# Patient Record
Sex: MALE | Race: WHITE | NOT HISPANIC OR LATINO | Employment: PART TIME | URBAN - METROPOLITAN AREA
[De-identification: names, ages, dates, MRNs, and addresses within clinical notes are randomized per-mention and may not be internally consistent; named-entity substitution may affect disease eponyms.]

---

## 2017-02-15 ENCOUNTER — ALLSCRIPTS OFFICE VISIT (OUTPATIENT)
Dept: OTHER | Facility: OTHER | Age: 67
End: 2017-02-15

## 2017-04-19 ENCOUNTER — ALLSCRIPTS OFFICE VISIT (OUTPATIENT)
Dept: OTHER | Facility: OTHER | Age: 67
End: 2017-04-19

## 2017-04-21 ENCOUNTER — APPOINTMENT (OUTPATIENT)
Dept: OCCUPATIONAL THERAPY | Facility: HOSPITAL | Age: 67
End: 2017-04-21
Payer: MEDICARE

## 2017-04-21 ENCOUNTER — ALLSCRIPTS OFFICE VISIT (OUTPATIENT)
Dept: OTHER | Facility: OTHER | Age: 67
End: 2017-04-21

## 2017-04-21 DIAGNOSIS — M72.0 PALMAR FASCIAL FIBROMATOSIS: ICD-10-CM

## 2017-04-21 PROCEDURE — G8990 OTHER PT/OT CURRENT STATUS: HCPCS

## 2017-04-21 PROCEDURE — L3913 HFO W/O JOINTS CF: HCPCS

## 2017-04-21 PROCEDURE — G8991 OTHER PT/OT GOAL STATUS: HCPCS

## 2017-05-24 ENCOUNTER — ALLSCRIPTS OFFICE VISIT (OUTPATIENT)
Dept: OTHER | Facility: OTHER | Age: 67
End: 2017-05-24

## 2017-09-01 ENCOUNTER — TRANSCRIBE ORDERS (OUTPATIENT)
Dept: ADMINISTRATIVE | Facility: HOSPITAL | Age: 67
End: 2017-09-01

## 2017-09-01 ENCOUNTER — APPOINTMENT (OUTPATIENT)
Dept: LAB | Facility: MEDICAL CENTER | Age: 67
End: 2017-09-01
Payer: MEDICARE

## 2017-09-01 DIAGNOSIS — I10 ESSENTIAL HYPERTENSION, MALIGNANT: ICD-10-CM

## 2017-09-01 DIAGNOSIS — E55.9 UNSPECIFIED VITAMIN D DEFICIENCY: ICD-10-CM

## 2017-09-01 DIAGNOSIS — I25.118 ATHEROSCLEROSIS OF NATIVE CORONARY ARTERY WITH OTHER FORM OF ANGINA PECTORIS, UNSPECIFIED WHETHER NATIVE OR TRANSPLANTED HEART (HCC): ICD-10-CM

## 2017-09-01 DIAGNOSIS — I25.118 ATHEROSCLEROSIS OF NATIVE CORONARY ARTERY WITH OTHER FORM OF ANGINA PECTORIS, UNSPECIFIED WHETHER NATIVE OR TRANSPLANTED HEART (HCC): Primary | ICD-10-CM

## 2017-09-01 DIAGNOSIS — E78.5 OTHER AND UNSPECIFIED HYPERLIPIDEMIA: ICD-10-CM

## 2017-09-01 LAB
25(OH)D3 SERPL-MCNC: 35.1 NG/ML (ref 30–100)
ALBUMIN SERPL BCP-MCNC: 3.7 G/DL (ref 3.5–5)
ALP SERPL-CCNC: 94 U/L (ref 46–116)
ALT SERPL W P-5'-P-CCNC: 111 U/L (ref 12–78)
ANION GAP SERPL CALCULATED.3IONS-SCNC: 6 MMOL/L (ref 4–13)
AST SERPL W P-5'-P-CCNC: 92 U/L (ref 5–45)
BASOPHILS # BLD AUTO: 0.06 THOUSANDS/ΜL (ref 0–0.1)
BASOPHILS NFR BLD AUTO: 1 % (ref 0–1)
BILIRUB SERPL-MCNC: 0.6 MG/DL (ref 0.2–1)
BUN SERPL-MCNC: 27 MG/DL (ref 5–25)
CALCIUM SERPL-MCNC: 8.5 MG/DL (ref 8.3–10.1)
CHLORIDE SERPL-SCNC: 105 MMOL/L (ref 100–108)
CHOLEST SERPL-MCNC: 122 MG/DL (ref 50–200)
CO2 SERPL-SCNC: 26 MMOL/L (ref 21–32)
CREAT SERPL-MCNC: 0.86 MG/DL (ref 0.6–1.3)
EOSINOPHIL # BLD AUTO: 0.56 THOUSAND/ΜL (ref 0–0.61)
EOSINOPHIL NFR BLD AUTO: 8 % (ref 0–6)
ERYTHROCYTE [DISTWIDTH] IN BLOOD BY AUTOMATED COUNT: 13.4 % (ref 11.6–15.1)
EST. AVERAGE GLUCOSE BLD GHB EST-MCNC: 114 MG/DL
GFR SERPL CREATININE-BSD FRML MDRD: 88 ML/MIN/1.73SQ M
GLUCOSE P FAST SERPL-MCNC: 105 MG/DL (ref 65–99)
HBA1C MFR BLD: 5.6 % (ref 4.2–6.3)
HCT VFR BLD AUTO: 41.2 % (ref 36.5–49.3)
HDLC SERPL-MCNC: 46 MG/DL (ref 40–60)
HGB BLD-MCNC: 14.3 G/DL (ref 12–17)
LDLC SERPL DIRECT ASSAY-MCNC: 74 MG/DL (ref 0–100)
LYMPHOCYTES # BLD AUTO: 3.21 THOUSANDS/ΜL (ref 0.6–4.47)
LYMPHOCYTES NFR BLD AUTO: 47 % (ref 14–44)
MCH RBC QN AUTO: 32.4 PG (ref 26.8–34.3)
MCHC RBC AUTO-ENTMCNC: 34.7 G/DL (ref 31.4–37.4)
MCV RBC AUTO: 93 FL (ref 82–98)
MONOCYTES # BLD AUTO: 0.5 THOUSAND/ΜL (ref 0.17–1.22)
MONOCYTES NFR BLD AUTO: 7 % (ref 4–12)
NEUTROPHILS # BLD AUTO: 2.56 THOUSANDS/ΜL (ref 1.85–7.62)
NEUTS SEG NFR BLD AUTO: 37 % (ref 43–75)
NRBC BLD AUTO-RTO: 0 /100 WBCS
PLATELET # BLD AUTO: 175 THOUSANDS/UL (ref 149–390)
PMV BLD AUTO: 11.3 FL (ref 8.9–12.7)
POTASSIUM SERPL-SCNC: 4.3 MMOL/L (ref 3.5–5.3)
PROT SERPL-MCNC: 6.9 G/DL (ref 6.4–8.2)
RBC # BLD AUTO: 4.41 MILLION/UL (ref 3.88–5.62)
SODIUM SERPL-SCNC: 137 MMOL/L (ref 136–145)
TRIGL SERPL-MCNC: 84 MG/DL
WBC # BLD AUTO: 6.9 THOUSAND/UL (ref 4.31–10.16)

## 2017-09-01 PROCEDURE — 82306 VITAMIN D 25 HYDROXY: CPT

## 2017-09-01 PROCEDURE — 83036 HEMOGLOBIN GLYCOSYLATED A1C: CPT

## 2017-09-01 PROCEDURE — 85025 COMPLETE CBC W/AUTO DIFF WBC: CPT

## 2017-09-01 PROCEDURE — 80061 LIPID PANEL: CPT

## 2017-09-01 PROCEDURE — 36415 COLL VENOUS BLD VENIPUNCTURE: CPT

## 2017-09-01 PROCEDURE — 80053 COMPREHEN METABOLIC PANEL: CPT

## 2017-09-01 PROCEDURE — 83721 ASSAY OF BLOOD LIPOPROTEIN: CPT

## 2017-09-20 ENCOUNTER — ALLSCRIPTS OFFICE VISIT (OUTPATIENT)
Dept: OTHER | Facility: OTHER | Age: 67
End: 2017-09-20

## 2017-09-20 DIAGNOSIS — R74.8 ABNORMAL LEVELS OF OTHER SERUM ENZYMES: ICD-10-CM

## 2017-09-20 DIAGNOSIS — I25.10 ATHEROSCLEROTIC HEART DISEASE OF NATIVE CORONARY ARTERY WITHOUT ANGINA PECTORIS: ICD-10-CM

## 2017-09-26 ENCOUNTER — HOSPITAL ENCOUNTER (OUTPATIENT)
Dept: NON INVASIVE DIAGNOSTICS | Facility: CLINIC | Age: 67
Discharge: HOME | End: 2017-09-26
Payer: MEDICARE

## 2017-09-26 DIAGNOSIS — I25.10 ATHEROSCLEROTIC HEART DISEASE OF NATIVE CORONARY ARTERY WITHOUT ANGINA PECTORIS: ICD-10-CM

## 2017-09-26 LAB
CHEST PAIN STATEMENT: NORMAL
MAX DIASTOLIC BP: 58 MMHG
MAX HEART RATE: 141 BPM
MAX PREDICTED HEART RATE: 151 BPM
MAX. SYSTOLIC BP: 152 MMHG
PROTOCOL NAME: NORMAL
REASON FOR TERMINATION: NORMAL
TARGET HR FORMULA: NORMAL
TEST INDICATION: NORMAL
TIME IN EXERCISE PHASE: 556 S

## 2017-09-26 PROCEDURE — 78452 HT MUSCLE IMAGE SPECT MULT: CPT

## 2017-09-26 PROCEDURE — A9502 TC99M TETROFOSMIN: HCPCS

## 2017-09-26 PROCEDURE — 93017 CV STRESS TEST TRACING ONLY: CPT

## 2017-10-11 ENCOUNTER — HOSPITAL ENCOUNTER (OUTPATIENT)
Dept: RADIOLOGY | Facility: HOSPITAL | Age: 67
Discharge: HOME | End: 2017-10-11
Payer: MEDICARE

## 2017-10-11 DIAGNOSIS — R74.8 ABNORMAL LEVELS OF OTHER SERUM ENZYMES: ICD-10-CM

## 2017-10-11 PROCEDURE — 74160 CT ABDOMEN W/CONTRAST: CPT

## 2017-10-11 RX ADMIN — IOHEXOL 100 ML: 350 INJECTION, SOLUTION INTRAVENOUS at 10:03

## 2017-10-20 NOTE — PROGRESS NOTES
Assessment    1  Elevated ALT measurement (790 4) (R74 0)  2  Elevated AST (SGOT) (790 4) (R74 0)  3  Coronary artery disease (414 00) (I25 10)   #1 general care-influenza vaccine given #2 status post successful treatment of Dupuytren's contracture #3 elevated liver enzymes-had these in the past  Decreased alcohol and had improved  Hepatitis B and C testing negative  Now again elevated with SGOT greater than SGPT-rule out from alcohol  Rule out from statin  Rule out fatty liver-rule out other  At this point we will do a CAT scan of his liver  He will decrease his Crestor from his current dose of 50 mg a week to 40 mg a week  He will be reevaluated in 2 months with repeat liver enzymes #4 cardiology artery disease-had a CABG in the year 2006  Scheduled for repeat Myoview #5 hematospermia-likely related to underlying BPH-monitored by urology  All other problems as per note of December 2016  MEDICAL REGIMEN: Decreasing Crestor to 40 mg weekly using 10 mg dosing, coenzyme Q10, baby aspirin daily, Niaspan 500 mg twice a day, Pepcid 20 mg daily, Naprosyn 500 MGs-1-1/2 tablets at onset of headache, very she use of Lorcet, topical metronidazole 0 75% twice a day when necessary to face  Scheduled for nuclear stress as per scheduled for CAT scan liver  Appointment in 2 months or prior liver profile  Addendum-exercise Myoview in September 2017 normal with normal perfusion-only issue not felt significant is nonspecific ST changes at end of exercise-MINNIE CHART NEXT VISIT EXERCISE MYOVIEW DONE IN [facility] 2017  Addendum-patient called the office on October the ninth 2003 to use his wife's Xanax prior to her CAT scan as he is claustrophobic  will use Xanax 0 25 mg taken 2 hours before procedure        Plan  Coronary artery disease    · * NM MYOCARDIAL PERFUSION SPECT (STRESS AND/OR REST); Status:Active - RetrospectiveAuthorization; Requested QVS:[id number];    Unlinked    · (1) COMPREHENSIVE METABOLIC PANEL; Status:Active - Retrospective Authorization; Requestedfor:01Mar2018;    · CT ABDOMEN W CONTRAST; Status:Active - Retrospective Authorization; Requestedfor:20Sep2017;   Decrease alcohol  Decrease Crestor to 40 mg weekly  CAT scan of liver  Appointment in 2 months with repeat chemistry profile   History of Present Illness  HPI: He was here for his Medicare well visit  He is noted to have elevated liver enzymes on laboratory testing that was done  Specifically labs show a normal vitamin D 35 HDL 46 LDL 74 triglycerides 84 chemistry profile normal with a creatinine of 46, sugar 105, A1c 5 6, cholesterol 122  Get an issue of elevated liver enzymes in the past and they have increased with less alcohol?possibly from a statin  Hepatitis A, B, and C testing negative  He says he may been having more alcohol lately but not large amounts  Was not using excess nonsteroidals  Denies abdominal pain  Denies nausea or vomiting  Bowels are been stable  No bloody stoolhad treatment for his Dupuytren's contracture which was  This patient denies any systemic symptoms  Specifically there has been no evidence of fever, night sweats, significant weight loss or significant decrease in appetite  Successful      Active Problems  1  Anemia (285 9) (D64 9)  2  Arthritis (716 90) (M19 90)  3  Asymptomatic carotid artery stenosis (433 10) (I65 29)  4  Atherosclerosis (440 9) (I70 90)  5  Benign prostate hyperplasia (600 00) (N40 0)  6  Coronary artery disease (414 00) (I25 10)  7  Dermatitis of face (692 9) (L30 9)  8  Dupuytren's contracture (728 6) (M72 0)  9  Gastroesophageal reflux disease (530 81) (K21 9)  10  Headache (784 0) (R51)  11  Hematospermia (608 82) (R36 1)  12  Hyperglycemia (790 29) (R73 9)  13  Hyperlipidemia (272 4) (E78 5)  14  Hypertension (401 9) (I10)  15  Impetigo (684) (L01 00)  16  Migraine headache (346 90) (G43 909)  17  Rash (782 1) (R21)  18  Seasonal allergies (477 9) (J30 2)  19   Vitamin D deficiency (268 9) (E55 9)    Past Medical History  1  History of upper respiratory infection (V12 09) (Z87 09)  2  No pertinent past medical history  Active Problems And Past Medical History Reviewed: The active problems and past medical history were reviewed and updated today  Surgical History  1  History of CABG  2  History of Tonsillectomy  Surgical History Reviewed: The surgical history was reviewed and updated today  Family History  Mother   1  No pertinent family history  Father   2  No pertinent family history    Social History     · Being A Social Drinker   · Denied: History of Drug Use   · Never A Smoker  Social History Reviewed: The social history was reviewed and updated today  The social history was reviewed and is unchanged  Current Meds  1  Benadryl TABS; Therapy: (Recorded:67Bnl3561) to Recorded  2  CoQ-10 100 MG Oral Capsule; Therapy: (Recorded:01Ksy6502) to Recorded  3  Famotidine 20 MG Oral Tablet; TAKE 1 TABLET DAILY AS DIRECTED; Therapy: 27MHK8623 to (Evaluate:59Ept3492)  Requested for: 71Rpp0284; Last Rx:54Rxk3113 Ordered  4  Fish Oil CAPS; Therapy: (Recorded:06Uyo6698) to Recorded  5  Mupirocin 2 % External Ointment; APPLY TO AFFECTED AREA TWICE A DAY; Therapy: 80Fzt0834 to (Last Rx:09Xta5586)  Requested for: 52Zna0260 Ordered  6  Naproxen 500 MG Oral Tablet (Naprosyn); take 1 1/2 tablets at onset severe migraine; Therapy: 40JGF4862 to (Last Rx:76Yxc7866)  Requested for: 16KJX5742 Ordered  7  Niacin 500 MG Oral Tablet; TAKE 2 TABLET Bedtime; Therapy: 10YCK4664 to (Evaluate:77Pje2399); Last Rx:01Jun2016 Ordered  8  Niaspan 500 MG Oral Tablet Extended Release (Niacin ER (Antihyperlipidemic)); TAKE 2 TABLETS AT BEDTIME; Therapy: 50HJJ3394 to (Evaluate:42Ixe0725)  Requested for: 38JFP9508; Last Rx:27Gkn1945 Ordered  9  PriLOSEC OTC 20 MG Oral Tablet Delayed Release; Therapy: (Recorded:31Owi9037) to Recorded  10  Rosuvastatin Calcium 10 MG Oral Tablet (Crestor); 1 TABLET DAILY;   Therapy: 06HJS6684 to (Last Rx:84Emh2155)  Requested for: 34LQC9517 Ordered  11  Rosuvastatin Calcium 5 MG Oral Tablet (Crestor); TAKE 1 TABLET DAILY; Therapy: 74YSC8244 to (Evaluate:67Awg2973)  Requested for: 25TNE3395; Last Rx:16Oih0119  Ordered  12  Triamcinolone Acetonide 0 1 % External Cream; APPLY  AND RUB  IN A THIN FILM TO AFFECTED  AREAS TWICE DAILY  (AM AND PM); Therapy: 21IZC8252 to (Last Rx:93Ivj1971)  Requested for: 31Oct2016 Ordered  13  Xiaflex 0 9 MG Injection Solution Reconstituted; INJECT 0 31 ML Once; Therapy: 06IAU4752 to (Last Rx:26Jba3008) Ordered  Medication List Reviewed: The medication list was reviewed and updated today  Allergies  1  No Known Drug Allergies    Vitals  Vital Signs    Recorded: 50VWO5277 09:07AM Recorded: 81RRL8628 08:33AM   Heart Rate 68    Respiration 14    Height  5 ft 6 in   Weight  167 lb 6 oz   BMI Calculated  27 02   BSA Calculated  1 85       Physical Exam   Constitutional  General appearance: No acute distress, well appearing and well nourished  Head and Face  Head and face: Normal    Palpation of the face and sinuses: No sinus tenderness  Eyes  Conjunctiva and lids: No erythema, swelling or discharge  Pupils and irises: Equal, round, reactive to light  Ears, Nose, Mouth, and Throat  External inspection of ears and nose: Normal    Otoscopic examination: Tympanic membranes translucent with normal light reflex  Canals patent without erythema  Hearing: Normal    Nasal mucosa, septum, and turbinates: Normal without edema or erythema  Lips, teeth, and gums: Normal, good dentition  Oropharynx: Normal with no erythema, edema, exudate or lesions  Neck  Neck: Supple, symmetric, trachea midline, no masses  Thyroid: Normal, no thyromegaly  Pulmonary  Respiratory effort: No increased work of breathing or signs of respiratory distress  Percussion of chest: Normal    Palpation of chest: Normal    Auscultation of lungs: Clear to auscultation     Cardiovascular  Palpation of heart: Normal PMI, no thrills  Auscultation of heart: Normal rate and rhythm, normal S1 and S2, no murmurs  Carotid pulses: 2+ bilaterally  Abdominal aorta: Normal    Femoral pulses: 2+ bilaterally  Pedal pulses: 2+ bilaterally  Examination of extremities for edema and/or varicosities: Normal    Chest  Breasts: Normal, no dimpling or skin changes appreciated  Palpation of breasts and axillae: Normal, no masses palpated  Chest: Abnormal  -- Scar from CABG  Abdomen  Abdomen: Non-tender, no masses  Liver and spleen: No hepatomegaly or splenomegaly  Examination for hernias: No hernias appreciated  Anus, perineum, and rectum: Normal sphincter tone, no masses, no prolapse  Genitourinary  Scrotal contents: Normal testes, no masses  Penis: Normal, no lesions  Digital rectal exam of prostate: Abnormal  -- Minimal enlargement of prostate without nodules  Lymphatic  Palpation of lymph nodes in neck: No lymphadenopathy  Palpation of lymph nodes in axillae: No lymphadenopathy  Palpation of lymph nodes in groin: No lymphadenopathy  Palpation of lymph nodes in other areas: No lymphadenopathy  Musculoskeletal  Gait and station: Normal    Inspection/palpation of digits and nails: Normal without clubbing or cyanosis  Inspection/palpation of joints, bones, and muscles: Abnormal  -- Pain in range of motion of the low back to slight degree  Negative leg raise bilaterally  No significant pain on range of motion of the hips or decrease in range of motion of the hips  No abnormalities to pinprick of the legs  Knee and ankle jerks intact and 2+  Able to heel and toe walk without difficulty  Range of motion: Normal    Stability: Normal    Muscle strength/tone: Normal    Skin  Skin and subcutaneous tissue: Normal without rashes or lesions  Examination of the skin for lesions: Abnormal  -- Mild erythematous areas below the nose  Palpation of skin and subcutaneous tissue: Normal turgor     Neurologic Cranial nerves: Cranial nerves 2-12 intact  Cortical function: Normal mental status  Reflexes: 2+ and symmetric  Sensation: No sensory loss  Coordination: Normal finger to nose and heel to shin  Psychiatric  Judgment and insight: Normal    Orientation to person, place and time: Normal    Recent and remote memory: Intact     Mood and affect: Normal        Signatures   Electronically signed by : CHICHO Abel ; Sep 20 2017  9:16AM EST                       (Author)    Electronically signed by : CHICHO Abel ; Oct  1 2017  9:15AM EST                       (Author)    Electronically signed by : CHICHO Abel ; Oct  9 2017  8:29PM EST                       (Author)

## 2017-11-09 ENCOUNTER — APPOINTMENT (OUTPATIENT)
Dept: LAB | Facility: MEDICAL CENTER | Age: 67
End: 2017-11-09
Payer: MEDICARE

## 2017-11-09 ENCOUNTER — TRANSCRIBE ORDERS (OUTPATIENT)
Dept: ADMINISTRATIVE | Facility: HOSPITAL | Age: 67
End: 2017-11-09

## 2017-11-09 DIAGNOSIS — N40.0 BENIGN PROSTATIC HYPERPLASIA, UNSPECIFIED WHETHER LOWER URINARY TRACT SYMPTOMS PRESENT: Primary | ICD-10-CM

## 2017-11-09 DIAGNOSIS — R74.8 ABNORMAL LEVELS OF OTHER SERUM ENZYMES: ICD-10-CM

## 2017-11-09 DIAGNOSIS — N40.0 BENIGN PROSTATIC HYPERPLASIA, UNSPECIFIED WHETHER LOWER URINARY TRACT SYMPTOMS PRESENT: ICD-10-CM

## 2017-11-09 LAB
ALBUMIN SERPL BCP-MCNC: 3.7 G/DL (ref 3.5–5)
ALP SERPL-CCNC: 72 U/L (ref 46–116)
ALT SERPL W P-5'-P-CCNC: 42 U/L (ref 12–78)
ANION GAP SERPL CALCULATED.3IONS-SCNC: 6 MMOL/L (ref 4–13)
AST SERPL W P-5'-P-CCNC: 25 U/L (ref 5–45)
BILIRUB SERPL-MCNC: 0.7 MG/DL (ref 0.2–1)
BUN SERPL-MCNC: 23 MG/DL (ref 5–25)
CALCIUM SERPL-MCNC: 8.7 MG/DL (ref 8.3–10.1)
CHLORIDE SERPL-SCNC: 105 MMOL/L (ref 100–108)
CO2 SERPL-SCNC: 27 MMOL/L (ref 21–32)
CREAT SERPL-MCNC: 0.89 MG/DL (ref 0.6–1.3)
GFR SERPL CREATININE-BSD FRML MDRD: 87 ML/MIN/1.73SQ M
GLUCOSE P FAST SERPL-MCNC: 108 MG/DL (ref 65–99)
POTASSIUM SERPL-SCNC: 4.4 MMOL/L (ref 3.5–5.3)
PROT SERPL-MCNC: 7.1 G/DL (ref 6.4–8.2)
PSA SERPL-MCNC: 1.7 NG/ML (ref 0–4)
SODIUM SERPL-SCNC: 138 MMOL/L (ref 136–145)

## 2017-11-09 PROCEDURE — 80053 COMPREHEN METABOLIC PANEL: CPT

## 2017-11-09 PROCEDURE — 36415 COLL VENOUS BLD VENIPUNCTURE: CPT

## 2017-11-09 PROCEDURE — G0103 PSA SCREENING: HCPCS

## 2017-11-13 ENCOUNTER — ALLSCRIPTS OFFICE VISIT (OUTPATIENT)
Dept: OTHER | Facility: OTHER | Age: 67
End: 2017-11-13

## 2017-11-13 DIAGNOSIS — K80.20 CALCULUS OF GALLBLADDER WITHOUT CHOLECYSTITIS WITHOUT OBSTRUCTION: ICD-10-CM

## 2017-11-14 NOTE — PROGRESS NOTES
Assessment   1  Elevated liver enzymes (790 5) (R74 8)  2  Hypertension (401 9) (I10)  3  Cholelithiases (574 20) (K80 20)  4  Abdominal wall hernia (553 20) (K43 9)  5  Hyperlipidemia (272 4) (E78 5)       1  General care-medically clear for cataract surgery forms filled out     2  Status post successful treatment of Dupuytrens contracture     3  Elevated liver enzymes-had these in the past  Decrease alcohol improved  Hepatitis-B and Celsius testing negative  Recently again with elevated SGOT greater than SGPT  CT scan shows unremarkable liver, gallstone, abdominal wall hernia  He is on a low dose of Crestor liver enzymes now normal  I do not think we need to do anything other than monitor  For repeat prior to next visit     4  Abdominal wall hernia-no treatment needed-small he also has associated diastasis recti     5  Cholelithiasis-he describes intermittent episodes over the years of infrequent abdominal pain  I did order an ultrasound  We will watch his pattern  For now continue to monitor  He says he does not have an episode more than once or twice a year     6  Coronary artery disease  Had a CABG in the year 2006  Recent Myoview in September this year negative     7  Amount of spur me a-felt related underlying BPH-monitored by Neurology     8  Hyperlipidemia-treating aggressive with his known CAD  On a statin   Non HDL correlates with his and MR like protein-recently dose of Crestor decreased because of elevated liver enzymes and now on a 4 days per week          All other problems as per noted December 2016               MEDICAL REGIMEN:         Crestor 10 milligrams 4 days per week, coenzyme Q10, baby aspirin daily, Niaspan 500 milligrams b i d , Pepcid 20 milligrams daily, Naprosyn 500 milligrams-1 and half tablets at onset of headache, infrequent use of Fioricet, topical metronidazole 0 75% b i d  p r n  to face          Appointment over the next 4 months with prior labs include chemistry profile, cholesterol profile          Addendum-ultrasound of right upper quadrant shows single gallstone without other issues  Addendum-patient [facility] in [facility] on December 15th for acute sinusitis and treated with Augmentin                    Plan   Cholelithiases    · * 56 [address]; Status:Active; Requested for:13Nov2017;   Hyperlipidemia, Hypertension    · (1) CHOLESTEROL, TOTAL; Status:Active; Requested ODH:[id number];    · (1) COMPREHENSIVE METABOLIC PANEL; Status:Active; Requested WPM:72FPQ4069;    · (1) HDL,DIRECT; Status:Active; Requested CDD:21IMF8200;    · (1) LDL,DIRECT; Status:Active; Requested WLS:81DZC7182;    · (1) TRIGLYCERIDE; Status:Active; Requested IGV:75TBL2624;       Continue Crestor at current dose  Ultrasound of gallbladder  Observe closely regarding frequency of gallbladder episodes  History of Present Illness   HPI: He returns for follow-up exam  As noted a a CT scan of his abdomen with several issues  He had cholelithiasis the with a punctate pendant calcified gallstone, he had aortic calcification  He had diastasis recti with a fat containing hernia measuring up to 3 centimeters  He had diffuse DJD  As noted at prior visit we adjust this dose of statin because of his elevated liver enzymes  He has had prior hepatitis-B and Celsius testing negative  He had elevated liver enzymes in the past any decreased alcohol was improved  Labs done prior to this visit show normal chemistry profile other than a random sugar 108 as well as a PSA of 1 7   has known hypertension  BP adequately controlled on current regimen  Avoiding salt and decongestants  Denies hematemesis pounding of his heart sweats and headache  also had a recent repeat Myoview which was normal with some nonspecific ST changes at the end of exercise but perfusion imaging normal    is not having any significant alcohol use  He has not had any pain from the abdominal wall hernia   We went over the anatomy of cholelithiasis   also he will be having cataract surgery requires a form to be filled out      Review of Systems   Complete-Male:      Constitutional: No fever or chills, feels well, no tiredness, no recent weight gain or weight loss  Eyes: No complaints of eye pain, no red eyes, no discharge from eyes, no itchy eyes  ENT: no complaints of earache, no hearing loss, no nosebleeds, no nasal discharge, no sore throat, no hoarseness  Cardiovascular: No complaints of slow heart rate, no fast heart rate, no chest pain, no palpitations, no leg claudication, no lower extremity  Respiratory: No complaints of shortness of breath, no wheezing, no cough, no SOB on exertion, no orthopnea or PND  Gastrointestinal: No complaints of abdominal pain, no constipation, no nausea or vomiting, no diarrhea or bloody stools  Genitourinary: Hematospermia, but-- no dysuria,-- no urinary hesitancy,-- no genital lesions,-- no incontinence-- and-- no nocturia  Musculoskeletal: No complaints of arthralgia, no myalgias, no joint swelling or stiffness, no limb pain or swelling  Integumentary: No complaints of skin rash or skin lesions, no itching, no skin wound, no dry skin  Neurological: No compliants of headache, no confusion, no convulsions, no numbness or tingling, no dizziness or fainting, no limb weakness, no difficulty walking  Psychiatric: Is not suicidal, no sleep disturbances, no anxiety or depression, no change in personality, no emotional problems  Endocrine: No complaints of proptosis, no hot flashes, no muscle weakness, no erectile dysfunction, no deepening of the voice, no feelings of weakness  Hematologic/Lymphatic: No complaints of swollen glands, no swollen glands in the neck, does not bleed easily, no easy bruising  Active Problems   1  Anemia (285 9) (D64 9)  2  Arthritis (716 90) (M19 90)  3  Asymptomatic carotid artery stenosis (433 10) (I65 29)  4  Atherosclerosis (440 9) (I70 90)  5  Benign prostate hyperplasia (600 00) (N40 0)  6  Cholelithiases (574 20) (K80 20)  7  Coronary artery disease (414 00) (I25 10)  8  Dermatitis of face (692 9) (L30 9)  9  Dupuytren's contracture (728 6) (M72 0)  10  Elevated ALT measurement (790 4) (R74 0)  11  Elevated AST (SGOT) (790 4) (R74 0)  12  Elevated liver enzymes (790 5) (R74 8)  13  Gastroesophageal reflux disease (530 81) (K21 9)  14  Headache (784 0) (R51)  15  Hematospermia (608 82) (R36 1)  16  Hyperglycemia (790 29) (R73 9)  17  Hyperlipidemia (272 4) (E78 5)  18  Hypertension (401 9) (I10)  19  Impetigo (684) (L01 00)  20  Migraine headache (346 90) (G43 909)  21  Need for immunization against influenza (V04 81) (Z23)  22  Rash (782 1) (R21)  23  Seasonal allergies (477 9) (J30 2)  24  Vitamin D deficiency (268 9) (E55 9)    Past Medical History   1  History of upper respiratory infection (V12 09) (Z87 09)  2  No pertinent past medical history  Active Problems And Past Medical History Reviewed: The active problems and past medical history were reviewed and updated today  Surgical History   1  History of CABG  2  History of Tonsillectomy  Surgical History Reviewed: The surgical history was reviewed and updated today  Family History   Mother   1  No pertinent family history  Father   2  No pertinent family history    Social History    · Being A Social Drinker   · Denied: History of Drug Use   · Never A Smoker  Social History Reviewed: The social history was reviewed and updated today  The social history was reviewed and is unchanged  Current Meds   1  Benadryl TABS; Therapy: (Recorded:95Env7957) to Recorded  2  CoQ-10 100 MG Oral Capsule; Therapy: (Recorded:01Dea1594) to Recorded  3  Famotidine 20 MG Oral Tablet; TAKE 1 TABLET DAILY AS DIRECTED; Therapy: 17LZN8175 to (Evaluate:04Uob6216)  Requested for: 18Lel7170; Last Rx:96Brf5887 Ordered  4  Fish Oil CAPS;      Therapy: (Recorded:20Ijs4248) to Recorded  5  Mupirocin 2 % External Ointment; APPLY TO AFFECTED AREA TWICE A DAY; Therapy: 00Dip0884 to (Last Rx:75Vsc1853)  Requested for: 65Fki4865 Ordered  6  Naproxen 500 MG Oral Tablet (Naprosyn); take 1 1/2 tablets at onset severe migraine; Therapy: 37BWS6917 to (Last Rx:84Ptk4135)  Requested for: 17WAR1979 Ordered  7  Niacin 500 MG Oral Tablet; TAKE 2 TABLET Bedtime; Therapy: 04QXC8290 to ([facility]:48OCL2495); Last Rx:40Evv1850 Ordered  8  Niaspan 500 MG Oral Tablet Extended Release (Niacin ER (Antihyperlipidemic)); TAKE 2 TABLETS AT     BEDTIME; Therapy: 13ESW7578 to (Evaluate:46Bsq8851)  Requested for: 22TRD6478; Last Rx:73Nzd7721     Ordered  9  PriLOSEC OTC 20 MG Oral Tablet Delayed Release; Therapy: (Recorded:90Tts7227) to Recorded  10  Rosuvastatin Calcium 10 MG Oral Tablet (Crestor); 1 TABLET DAILY; Therapy: 31CQE9005 to (Last Rx:28Uda3134)  Requested for: 36OTQ4841 Ordered  11  Rosuvastatin Calcium 5 MG Oral Tablet (Crestor); TAKE 1 TABLET DAILY; Therapy: 71FWK0037 to (Evaluate:72Ukm6537)  Requested for: 75NKO7130; Last Rx:36Rnv6409      Ordered  12  Triamcinolone Acetonide 0 1 % External Cream; APPLY  AND RUB  IN A THIN FILM TO AFFECTED      AREAS TWICE DAILY  (AM AND PM); Therapy: 46GRL7290 to (Last Rx:16Yer8536)  Requested for: 31Oct2016 Ordered  13  Xiaflex 0 9 MG Injection Solution Reconstituted; INJECT 0 31 ML Once; Therapy: 83TVQ3624 to (Last Rx:15Wtx5843) Ordered  Medication List Reviewed: The medication list was reviewed and updated today  Allergies   1   No Known Drug Allergies    Vitals   Vital Signs    Recorded: 61IYX0246 08:32PM Recorded: 33UXL2051 12:05PM   Heart Rate 68    Respiration 14    Systolic 201, RLE, Sitting    Diastolic 78, RLE, Sitting    Height  5 ft 6 in   Weight  166 lb 6 oz   BMI Calculated  26 85   BSA Calculated  1 85     Physical Exam        Constitutional      General appearance: No acute distress, well appearing and well nourished  Head and Face      Head and face: Normal        Palpation of the face and sinuses: No sinus tenderness  Eyes      Conjunctiva and lids: No erythema, swelling or discharge  Pupils and irises: Equal, round, reactive to light  Ears, Nose, Mouth, and Throat      External inspection of ears and nose: Normal        Otoscopic examination: Tympanic membranes translucent with normal light reflex  Canals patent without erythema  Hearing: Normal        Nasal mucosa, septum, and turbinates: Normal without edema or erythema  Lips, teeth, and gums: Normal, good dentition  Oropharynx: Normal with no erythema, edema, exudate or lesions  Neck      Neck: Supple, symmetric, trachea midline, no masses  Thyroid: Normal, no thyromegaly  Pulmonary      Respiratory effort: No increased work of breathing or signs of respiratory distress  Percussion of chest: Normal        Palpation of chest: Normal        Auscultation of lungs: Clear to auscultation  Cardiovascular      Palpation of heart: Normal PMI, no thrills  Auscultation of heart: Normal rate and rhythm, normal S1 and S2, no murmurs  Carotid pulses: 2+ bilaterally  Abdominal aorta: Normal        Femoral pulses: 2+ bilaterally  Pedal pulses: 2+ bilaterally  Examination of extremities for edema and/or varicosities: Normal        Chest      Breasts: Normal, no dimpling or skin changes appreciated  Palpation of breasts and axillae: Normal, no masses palpated  Chest: Abnormal  -- Scar from CABG  Abdomen      Abdomen: Non-tender, no masses  Liver and spleen: No hepatomegaly or splenomegaly  Examination for hernias: No hernias appreciated  Anus, perineum, and rectum: Normal sphincter tone, no masses, no prolapse  Genitourinary      Scrotal contents: Normal testes, no masses  Penis: Normal, no lesions  Digital rectal exam of prostate: Abnormal  -- Minimal enlargement of prostate without nodules  Lymphatic      Palpation of lymph nodes in neck: No lymphadenopathy  Palpation of lymph nodes in axillae: No lymphadenopathy  Palpation of lymph nodes in groin: No lymphadenopathy  Palpation of lymph nodes in other areas: No lymphadenopathy  Musculoskeletal      Gait and station: Normal        Inspection/palpation of digits and nails: Normal without clubbing or cyanosis  Inspection/palpation of joints, bones, and muscles: Abnormal  -- Pain in range of motion of the low back to slight degree  Negative leg raise bilaterally  No significant pain on range of motion of the hips or decrease in range of motion of the hips  No abnormalities to pinprick of the legs  Knee and ankle jerks intact and 2+  Able to heel and toe walk without difficulty  Range of motion: Normal        Stability: Normal        Muscle strength/tone: Normal        Skin      Skin and subcutaneous tissue: Normal without rashes or lesions  Examination of the skin for lesions: Abnormal  -- Mild erythematous areas below the nose  Palpation of skin and subcutaneous tissue: Normal turgor  Neurologic      Cranial nerves: Cranial nerves 2-12 intact  Cortical function: Normal mental status  Reflexes: 2+ and symmetric  Sensation: No sensory loss  Coordination: Normal finger to nose and heel to shin  Psychiatric      Judgment and insight: Normal        Orientation to person, place and time: Normal        Recent and remote memory: Intact  Mood and affect: Normal        Future Appointments      Date/Time Provider Specialty Site   03/13/2018 08:00 AM CHICHO Ortega   Internal Medicine Kishor Pineda MD     Signatures    Electronically signed by : CHICHO Blake ; Nov 13 2017  8:39PM EST                       (Author)     Electronically signed by : Travis Messina CHICHO Leon ; Nov 13 2017  8:42PM EST                       (Author)     Electronically signed by : CHICHO Anderson ; Nov 23 2017  8:09AM EST                       (Author)     Electronically signed by : CHICHO Anderson ; Dec 21 2017  1:40PM EST                       (Author)

## 2017-11-17 ENCOUNTER — HOSPITAL ENCOUNTER (OUTPATIENT)
Dept: ULTRASOUND IMAGING | Facility: HOSPITAL | Age: 67
Discharge: HOME | End: 2017-11-17
Payer: MEDICARE

## 2017-11-17 DIAGNOSIS — K80.20 CALCULUS OF GALLBLADDER WITHOUT CHOLECYSTITIS WITHOUT OBSTRUCTION: ICD-10-CM

## 2017-11-17 PROCEDURE — 76700 US EXAM ABDOM COMPLETE: CPT

## 2017-11-27 ENCOUNTER — GENERIC CONVERSION - ENCOUNTER (OUTPATIENT)
Dept: INTERNAL MEDICINE CLINIC | Facility: CLINIC | Age: 67
End: 2017-11-27

## 2017-12-15 ENCOUNTER — OFFICE VISIT (OUTPATIENT)
Dept: URGENT CARE | Facility: MEDICAL CENTER | Age: 67
End: 2017-12-15
Payer: MEDICARE

## 2017-12-15 PROCEDURE — 99213 OFFICE O/P EST LOW 20 MIN: CPT

## 2017-12-15 PROCEDURE — G0463 HOSPITAL OUTPT CLINIC VISIT: HCPCS

## 2017-12-19 NOTE — PROGRESS NOTES
Assessment  1  Acute sinusitis (461 9) (J01 90)    Plan  Acute sinusitis    · Amoxicillin-Pot Clavulanate 875-125 MG Oral Tablet; TAKE 1 TABLET EVERY 12HOURS DAILY   · Seek Immediate Medical Attention if: You have a fever, headache, and vomiting, or have astiff neck ; Status:Complete;   Done: 55HOI2665   · Seek Immediate Medical Attention if: You have a severe headache that will not go away  ;Status:Complete;   Done: 91ZDC4001   · Seek Immediate Medical Attention if: You have signs of infection in or around the affectedarea ; Status:Complete;   Done: 54IUD8625   · Drink at least 6 glasses of water or juice a day ; Status:Complete;   Done: 84PTY0714   · How to use a nasal spray ; Status:Complete;   Done: 76DVW0080   · Irrigate your nose twice a day ; Status:Complete;   Done: 17JGY5583   · Taking a hot steamy shower may help your condition ; Status:Complete;   Done:52Jpf1772    Discussion/Summary  Discussion Summary:   Today your symptoms are consistent with sinusitis-Take the medication as directed with food- try taking antihistamine such as Zyrtec or Claritin for symptom support- Use Nasal spray Flonase as directed  - Increase clear fluids at home and you can alternate Tylenol/Ibuprofen as needed for symptom control- Warm salt H20 gargles/lozenges as needed for sore throat- Try using a humidifier in your urqnnbk-Lhteoc-ko with your primary care physician for re-evaluation within 5-7 days-If you have worsening symptoms or any signs of distress please go to the nearest ER  Medication Side Effects Reviewed: Possible side effects of new medications were reviewed with the patient/guardian today  Understands and agrees with treatment plan: The treatment plan was reviewed with the patient/guardian  The patient/guardian understands and agrees with the treatment plan   Counseling Documentation With Imm: The patient was counseled regarding instructions for management  Follow Up Instructions:    Follow Up with your Primary Care Provider in 3-5 days  If your symptoms worsen, go to the nearest [place] [address] Emergency Department  Chief Complaint  1  Cold Symptoms  Chief Complaint Free Text Note Form: Patient here with sinus congestion and a dry cough  These symptoms started 5 days ago  Nasal secretions thick yellow  History of Present Illness  HPI: 69 y o male who presents with complaint of sinus congestion/nasal congestion, cough, and slight fever X 5 days  Patient states his family has been sick and he may have caught something from one of them  he has not been able to take any Sudafed because of his history of CABG X 4  He recently had cataract removal surgery as well  ROS is as follows  Hospital Based Practices Required Assessment:  Pain Assessment  the patient states they have pain  The pain is located in the sinus pressure  (on a scale of 0 to 10, the patient rates the pain at 2 )  Abuse And Domestic Violence Screen   Yes, the patient is safe at home  -- The patient states no one is hurting them  Depression And Suicide Screen  No, the patient has not had thoughts of hurting themself  No, the patient has not felt depressed in the past 7 days  Prefered Language is  [place]  Primary Language is  English  Review of Systems  Focused-Male:  Constitutional: fever, but-- no chills  ENT: sore throat-- and-- nasal discharge  Cardiovascular: no chest pain-- and-- no palpitations  Respiratory: cough, but-- no shortness of breath-- and-- no wheezing  Gastrointestinal: no abdominal pain,-- no nausea,-- no vomiting-- and-- no diarrhea  ROS Reviewed:   ROS reviewed  Active Problems  1  Abdominal wall hernia (553 20) (K43 9)   2  Anemia (285 9) (D64 9)   3  Arthritis (716 90) (M19 90)   4  Asymptomatic carotid artery stenosis (433 10) (I65 29)   5  Atherosclerosis (440 9) (I70 90)   6  Benign prostate hyperplasia (600 00) (N40 0)   7  Cholelithiases (574 20) (K80 20)   8   Coronary artery disease (414 00) (I25 10) 9  Dermatitis of face (692 9) (L30 9)   10  Dupuytren's contracture (728 6) (M72 0)   11  Elevated ALT measurement (790 4) (R74 0)   12  Elevated AST (SGOT) (790 4) (R74 0)   13  Elevated liver enzymes (790 5) (R74 8)   14  Gastroesophageal reflux disease (530 81) (K21 9)   15  Headache (784 0) (R51)   16  Hematospermia (608 82) (R36 1)   17  Hyperglycemia (790 29) (R73 9)   18  Hyperlipidemia (272 4) (E78 5)   19  Hypertension (401 9) (I10)   20  Impetigo (684) (L01 00)   21  Migraine headache (346 90) (G43 909)   22  Need for immunization against influenza (V04 81) (Z23)   23  Rash (782 1) (R21)   24  Seasonal allergies (477 9) (J30 2)   25  Vitamin D deficiency (268 9) (E55 9)    Past Medical History  1  History of upper respiratory infection (V12 09) (Z87 09)   2  No pertinent past medical history  Active Problems And Past Medical History Reviewed: The active problems and past medical history were reviewed and updated today  Family History  Mother    1  No pertinent family history  Father    2  No pertinent family history  Family History Reviewed: The family history was reviewed and updated today  Social History   · Being A Social Drinker   · Denied: History of Drug Use   · Never A Smoker  Social History Reviewed: The social history was reviewed and updated today  The social history was reviewed and is unchanged  Surgical History  1  History of CABG   2  History of Tonsillectomy  Surgical History Reviewed: The surgical history was reviewed and updated today  Current Meds   1  Benadryl TABS; Therapy: (Recorded:28Jjb1719) to Recorded   2  CoQ-10 100 MG Oral Capsule; Therapy: (Recorded:59Qts1808) to Recorded   3  Famotidine 20 MG Oral Tablet; TAKE 1 TABLET DAILY AS DIRECTED; Therapy: 35OJC9606 to (Sara Bentley)  Requested for: 58KES4022; Last Rx:81Hex4378 Ordered   4  Fish Oil CAPS; Therapy: (Recorded:24Qge8759) to Recorded   5   Mupirocin 2 % External Ointment; APPLY TO AFFECTED AREA TWICE A DAY; Therapy: 72Osy5969 to (Last Rx:10Jng8588)  Requested for: 23Kqf2351 Ordered   6  Naproxen 500 MG Oral Tablet; take 1 1/2 tablets at onset severe migraine; Therapy: 50RPS7898 to (Last Rx:52Efp4750)  Requested for: 98MAL8743 Ordered   7  Niacin 500 MG Oral Tablet; TAKE 2 TABLET Bedtime; Therapy: 97POM5421 to (JANLYONC:08MFI3348); Last Rx:66Jqh4618 Ordered   8  Niaspan 500 MG Oral Tablet Extended Release; TAKE 2 TABLETS AT BEDTIME; Therapy: 65KJT6742 to (Evaluate:58Suf8932)  Requested for: 79ZTB7415; Last Rx:83Buq1229 Ordered   9  PriLOSEC OTC 20 MG Oral Tablet Delayed Release; Therapy: (Recorded:76Idh8838) to Recorded   10  Rosuvastatin Calcium 10 MG Oral Tablet; 1 TABLET DAILY; Therapy: 88ODU5942 to (Last Rx:41Tae9926)  Requested for: 79SAG9293 Ordered   11  Rosuvastatin Calcium 5 MG Oral Tablet; TAKE 1 TABLET DAILY; Therapy: 35AZY2970 to (Evaluate:44Jar8522)  Requested for: 03PJG8624; Last  Rx:47Kxw6665 Ordered   12  Triamcinolone Acetonide 0 1 % External Cream; APPLY  AND RUB  IN A THIN FILM TO  AFFECTED AREAS TWICE DAILY  (AM AND PM); Therapy: 84LDS2556 to (Last Rx:46Hxj7354)  Requested for: 22Bfx9867 Ordered   13  Xiaflex 0 9 MG Injection Solution Reconstituted; INJECT 0 31 ML Once; Therapy: 87BAP8248 to (Last Rx:95Qkj6776) Ordered  Medication List Reviewed: The medication list was reviewed and updated today  Allergies  1  No Known Drug Allergies    Vitals  Signs   Recorded: 15Dec2017 10:18AM   Temperature: 99 1 F, Tympanic  Heart Rate: 83  Respiration: 16  Systolic: 356, RUE, Sitting  Diastolic: 64, RUE, Sitting  Height: 5 ft 6 in  Weight: 166 lb   BMI Calculated: 26 79  BSA Calculated: 1 85  O2 Saturation: 98  Pain Scale: 0    Physical Exam   Constitutional  General appearance: No acute distress, well appearing and well nourished  Eyes  Conjunctiva and lids: No swelling, erythema, or discharge  Pupils and irises: Equal, round and reactive to light     Ears, Nose, Mouth, and Throat External inspection of ears and nose: Normal    Otoscopic examination: Tympanic membrance translucent with normal light reflex  Canals patent without erythema  Nasal mucosa, septum, and turbinates: Normal without edema or erythema  Oropharynx: Abnormal  -- erythema without exudates  Pulmonary  Respiratory effort: No increased work of breathing or signs of respiratory distress  Auscultation of lungs: Clear to auscultation  Cardiovascular  Palpation of heart: Normal PMI, no thrills  Auscultation of heart: Normal rate and rhythm, normal S1 and S2, without murmurs  Psychiatric  Orientation to person, place and time: Normal    Mood and affect: Normal        Future Appointments    Date/Time Provider Specialty Site   03/13/2018 08:00 AM CHICHO Schuler   Internal Medicine Mendel Spire MD     Signatures   Electronically signed by : [place], [facility]; Dec 15 2017 11:07AM EST                       (Author)    Electronically signed by : CHICHO Martin ; Dec 18 2017  1:12PM EST

## 2018-01-12 VITALS
SYSTOLIC BLOOD PRESSURE: 111 MMHG | WEIGHT: 168.25 LBS | DIASTOLIC BLOOD PRESSURE: 71 MMHG | HEART RATE: 60 BPM | BODY MASS INDEX: 27.04 KG/M2 | HEIGHT: 66 IN

## 2018-01-13 VITALS — BODY MASS INDEX: 26.9 KG/M2 | WEIGHT: 167.38 LBS | HEART RATE: 68 BPM | HEIGHT: 66 IN | RESPIRATION RATE: 14 BRPM

## 2018-01-13 VITALS
SYSTOLIC BLOOD PRESSURE: 113 MMHG | HEIGHT: 66 IN | DIASTOLIC BLOOD PRESSURE: 69 MMHG | BODY MASS INDEX: 27.36 KG/M2 | WEIGHT: 170.25 LBS | HEART RATE: 80 BPM

## 2018-01-13 VITALS
HEART RATE: 65 BPM | HEIGHT: 66 IN | DIASTOLIC BLOOD PRESSURE: 65 MMHG | SYSTOLIC BLOOD PRESSURE: 114 MMHG | WEIGHT: 168.5 LBS | BODY MASS INDEX: 27.08 KG/M2

## 2018-01-13 VITALS
HEART RATE: 61 BPM | DIASTOLIC BLOOD PRESSURE: 71 MMHG | HEIGHT: 66 IN | SYSTOLIC BLOOD PRESSURE: 124 MMHG | BODY MASS INDEX: 27.04 KG/M2 | WEIGHT: 168.25 LBS

## 2018-01-14 VITALS
SYSTOLIC BLOOD PRESSURE: 128 MMHG | WEIGHT: 166.38 LBS | HEART RATE: 68 BPM | DIASTOLIC BLOOD PRESSURE: 78 MMHG | RESPIRATION RATE: 14 BRPM | HEIGHT: 66 IN | BODY MASS INDEX: 26.74 KG/M2

## 2018-01-23 VITALS
RESPIRATION RATE: 16 BRPM | HEIGHT: 66 IN | HEART RATE: 83 BPM | BODY MASS INDEX: 26.68 KG/M2 | SYSTOLIC BLOOD PRESSURE: 116 MMHG | DIASTOLIC BLOOD PRESSURE: 64 MMHG | OXYGEN SATURATION: 98 % | TEMPERATURE: 99.1 F | WEIGHT: 166 LBS

## 2018-01-23 NOTE — PROGRESS NOTES
Assessment  #1 general care-Medicare well visit completed  #2 health maintenance-given influenza vaccine today  #3 elevated liver enzymes-she separate dictation of this visit     History of Present Illness  Welcome to Medicare and Wellness Visits: The patient is being seen for the initial annual wellness visit  Medicare Screening and Risk Factors   Hospitalizations: no previous hospitalizations and No recent hospitalizations  Once per lifetime medicare screening tests: ECG  Medicare Screening Tests Risk Questions   Abdominal aortic aneurysm risk assessment: Scheduled for CAT scan of abdomen  Osteoporosis risk assessment: none indicated  HIV risk assessment: none indicated  Drug and Alcohol Use: The patient has never smoked cigarettes  The patient reports frequent alcohol use and drinking 5-6 drinks per week  Alcohol concern:   The patient has no concerns about alcohol abuse  He has never used illicit drugs  Diet and Physical Activity: Current diet includes well balanced meals, 1-2 servings of fruit per day, 1-2 servings of vegetables per day, 0-1 servings of meat per day, 1-2 servings of whole grains per day, 0-1 servings of dairy products per day, 1-2 cups of coffee per day, 0 cups of tea per day, 0 cans of regular soda per day, 0 cans of diet soda per day and 2-3 glasses of water per day  He exercises infrequently and exercises 2 times per week  Exercise: walking, strength training 60 minutes per day  Mood Disorder and Cognitive Impairment Screening: Anxiety screening was done using SANDEEP 7 score of zero  Depression screening was done using [facility] well-being index score of 22   negative for symptoms  He denies feeling down, depressed, or hopeless over the past two weeks  He denies feeling little interest or pleasure in doing things over the past two weeks     Cognitive impairment screening: denies difficulty learning/retaining new information, denies difficulty handling complex tasks, denies difficulty with reasoning, denies difficulty with spatial ability and orientation, denies difficulty with language and denies difficulty with behavior  Functional Ability/Level of Safety: Hearing is normal bilaterally  He denies hearing difficulties  He does not use a hearing aid  The patient is currently able to do activities of daily living without limitations, able to do instrumental activities of daily living without limitations, able to participate in social activities without limitations and able to drive without limitations  Activities of daily living details: does not need help using the phone, no transportation help needed, does not need help shopping, no meal preparation help needed, does not need help doing housework, does not need help doing laundry, does not need help managing medications and does not need help managing money  Injury History: no polypharmacy, no alcohol use, no mobility impairment, no antidepressant use, no deconditioning, no postural hypotension, no sedative use, no visual impairment, no urinary incontinence, antihypertensive use, no cognitive impairment, up and go test was normal and no previous fall  Home safety risk factors:  loose rugs and no handrails on the stairs, but no poor household lighting, no uneven floors, no household clutter and grab bars in the bathroom  Advance Directives: Advance directives: living will, durable power of  for health care directives and advance directives  end of life decisions were reviewed with the patient and I agree with the patient's decisions  Co-Managers and Medical Equipment/Suppliers: See Patient Care Team   Additional Information: Patient's had both types of pneumococcal vaccine  Given influenza vaccine today  Has had herpes zoster vaccine  Has had relatively recent colonoscopy  Has periodic PSA  Review of Systems    Constitutional: negative  Eyes: negative  ENT: negative  Cardiovascular: negative     Respiratory: negative  Gastrointestinal: negative  Genitourinary: negative  Musculoskeletal: negative  Integumentary and Breasts: negative  Neurological: negative  Psychiatric: negative  Endocrine: negative  Hematologic and Lymphatic: negative  Active Problems    1  Anemia (285 9) (D64 9)   2  Arthritis (716 90) (M19 90)   3  Asymptomatic carotid artery stenosis (433 10) (I65 29)   4  Atherosclerosis (440 9) (I70 90)   5  Benign prostate hyperplasia (600 00) (N40 0)   6  Coronary artery disease (414 00) (I25 10)   7  Dermatitis of face (692 9) (L30 9)   8  Dupuytren's contracture (728 6) (M72 0)   9  Gastroesophageal reflux disease (530 81) (K21 9)   10  Headache (784 0) (R51)   11  Hematospermia (608 82) (R36 1)   12  Hyperglycemia (790 29) (R73 9)   13  Hyperlipidemia (272 4) (E78 5)   14  Hypertension (401 9) (I10)   15  Impetigo (684) (L01 00)   16  Migraine headache (346 90) (G43 909)   17  Rash (782 1) (R21)   18  Seasonal allergies (477 9) (J30 2)   19  Vitamin D deficiency (268 9) (E55 9)    Past Medical History    · History of upper respiratory infection (V12 09) (Z87 09)   · No pertinent past medical history    The active problems and past medical history were reviewed and updated today  Surgical History    · History of CABG   · History of Tonsillectomy    The surgical history was reviewed and updated today  Family History  Mother    · No pertinent family history  Father    · No pertinent family history    The family history was reviewed and updated today  Social History    · Being A Social Drinker   · Denied: History of Drug Use   · Never A Smoker  The social history was reviewed and updated today  The social history was reviewed and is unchanged  Current Meds   1  Benadryl TABS; Therapy: (Recorded:16Nic0180) to Recorded   2  CoQ-10 100 MG Oral Capsule; Therapy: (Recorded:17Wui5813) to Recorded   3  Famotidine 20 MG Oral Tablet; TAKE 1 TABLET DAILY AS DIRECTED;    Therapy: 97AEX5220 to (Evaluate:97Tji3089)  Requested for: 47Jeg0142; Last   Rx:68Dsk9298 Ordered   4  Fish Oil CAPS; Therapy: (Recorded:66Meh3588) to Recorded   5  Mupirocin 2 % External Ointment; APPLY TO AFFECTED AREA TWICE A DAY; Therapy: 20Zad7464 to (Last Rx:75Xrb6093)  Requested for: 06Tjp2248 Ordered   6  Naproxen 500 MG Oral Tablet; take 1 1/2 tablets at onset severe migraine; Therapy: 40IDM7243 to (Last Rx:14Tdf9346)  Requested for: 19OLA3899 Ordered   7  Niacin 500 MG Oral Tablet; TAKE 2 TABLET Bedtime; Therapy: 67JJC0152 to (Evaluate:23Mhu6162); Last Rx:04Bts3178 Ordered   8  Niaspan 500 MG Oral Tablet Extended Release; TAKE 2 TABLETS AT BEDTIME; Therapy: 97WIK3412 to (Evaluate:97Ebo1496)  Requested for: 16FRE9537; Last   Rx:25Mdv1539 Ordered   9  PriLOSEC OTC 20 MG Oral Tablet Delayed Release; Therapy: (Recorded:45Npm4396) to Recorded   10  Rosuvastatin Calcium 10 MG Oral Tablet; 1 TABLET DAILY; Therapy: 31QMJ1672 to (Last Rx:22Nkj7321)  Requested for: 70FZZ5526 Ordered   11  Rosuvastatin Calcium 5 MG Oral Tablet; TAKE 1 TABLET DAILY; Therapy: 65WFA2577 to (Evaluate:18Mto9664)  Requested for: 11WKC5203; Last    Rx:85Vty2733 Ordered   12  Triamcinolone Acetonide 0 1 % External Cream; APPLY  AND RUB  IN A THIN FILM TO    AFFECTED AREAS TWICE DAILY  (AM AND PM); Therapy: 32OSO7956 to (Last Rx:31Oct2016)  Requested for: 31Oct2016 Ordered   13  Xiaflex 0 9 MG Injection Solution Reconstituted; INJECT 0 31 ML Once; Therapy: 35ZVD5006 to (Last Rx:73Rky6653) Ordered    The medication list was reviewed and updated today  Allergies    1   No Known Drug Allergies    Immunizations   1 2    Influenza  19-Nov-2015 14-Nov-2016    PCV  29-Jul-2015     PPSV  07-Aug-2014     Zoster  28-Jun-2016      Vitals  Signs    Heart Rate: 68  Respiration: 14   Height: 5 ft 6 in  Weight: 167 lb 6 oz  BMI Calculated: 27 02  BSA Calculated: 1 85    Physical Exam    Constitutional   General appearance: No acute distress, well appearing and well nourished  Head and Face   Head and face: Normal     Palpation of the face and sinuses: No sinus tenderness  Eyes   Conjunctiva and lids: No erythema, swelling or discharge  Pupils and irises: Equal, round, reactive to light  Ears, Nose, Mouth, and Throat   External inspection of ears and nose: Normal     Otoscopic examination: Tympanic membranes translucent with normal light reflex  Canals patent without erythema  Hearing: Normal     Nasal mucosa, septum, and turbinates: Normal without edema or erythema  Lips, teeth, and gums: Normal, good dentition  Oropharynx: Normal with no erythema, edema, exudate or lesions  Neck   Neck: Supple, symmetric, trachea midline, no masses  Thyroid: Normal, no thyromegaly  Pulmonary   Respiratory effort: No increased work of breathing or signs of respiratory distress  Percussion of chest: Normal     Palpation of chest: Normal     Auscultation of lungs: Clear to auscultation  Cardiovascular   Palpation of heart: Normal PMI, no thrills  Auscultation of heart: Normal rate and rhythm, normal S1 and S2, no murmurs  Carotid pulses: 2+ bilaterally  Abdominal aorta: Normal     Femoral pulses: 2+ bilaterally  Pedal pulses: 2+ bilaterally  Examination of extremities for edema and/or varicosities: Normal     Chest   Breasts: Normal, no dimpling or skin changes appreciated  Palpation of breasts and axillae: Normal, no masses palpated  Chest: Abnormal   Scar from CABG  Abdomen   Abdomen: Non-tender, no masses  Liver and spleen: No hepatomegaly or splenomegaly  Examination for hernias: No hernias appreciated  Anus, perineum, and rectum: Normal sphincter tone, no masses, no prolapse  Genitourinary   Scrotal contents: Normal testes, no masses  Penis: Normal, no lesions  Digital rectal exam of prostate: Abnormal   Minimal enlargement of prostate without nodules     Lymphatic   Palpation of lymph nodes in neck: No lymphadenopathy  Palpation of lymph nodes in axillae: No lymphadenopathy  Palpation of lymph nodes in groin: No lymphadenopathy  Palpation of lymph nodes in other areas: No lymphadenopathy  Musculoskeletal   Gait and station: Normal     Inspection/palpation of digits and nails: Normal without clubbing or cyanosis  Inspection/palpation of joints, bones, and muscles: Abnormal   Pain in range of motion of the low back to slight degree  Negative leg raise bilaterally  No significant pain on range of motion of the hips or decrease in range of motion of the hips  No abnormalities to pinprick of the legs  Knee and ankle jerks intact and 2+  Able to heel and toe walk without difficulty  Range of motion: Normal     Stability: Normal     Muscle strength/tone: Normal     Skin   Skin and subcutaneous tissue: Normal without rashes or lesions  Examination of the skin for lesions: Abnormal   Mild erythematous areas below the nose  Palpation of skin and subcutaneous tissue: Normal turgor  Neurologic   Cranial nerves: Cranial nerves 2-12 intact  Cortical function: Normal mental status  Reflexes: 2+ and symmetric  Sensation: No sensory loss  Coordination: Normal finger to nose and heel to shin  Psychiatric   Judgment and insight: Normal     Orientation to person, place and time: Normal     Recent and remote memory: Intact      Mood and affect: Normal        Signatures   Electronically signed by : CHICHO Elizalde ; Sep 20 2017  9:10AM EST                       (Author)

## 2018-02-21 DIAGNOSIS — K21.9 GASTROESOPHAGEAL REFLUX DISEASE, ESOPHAGITIS PRESENCE NOT SPECIFIED: Primary | ICD-10-CM

## 2018-02-21 DIAGNOSIS — E78.5 HYPERLIPIDEMIA, UNSPECIFIED HYPERLIPIDEMIA TYPE: ICD-10-CM

## 2018-02-21 RX ORDER — NIACIN 500 MG
2 TABLET ORAL 2 TIMES DAILY
COMMUNITY
Start: 2016-06-01 | End: 2018-02-21 | Stop reason: SDUPTHER

## 2018-02-21 RX ORDER — FAMOTIDINE 20 MG/1
20 TABLET, FILM COATED ORAL DAILY
Qty: 90 TABLET | Refills: 3 | Status: SHIPPED | OUTPATIENT
Start: 2018-02-21 | End: 2019-01-03 | Stop reason: SDUPTHER

## 2018-02-21 RX ORDER — NIACIN 500 MG
500 TABLET ORAL 2 TIMES DAILY
Qty: 180 TABLET | Refills: 3 | Status: SHIPPED | OUTPATIENT
Start: 2018-02-21 | End: 2020-02-18 | Stop reason: ALTCHOICE

## 2018-02-21 RX ORDER — FAMOTIDINE 20 MG/1
1 TABLET, FILM COATED ORAL DAILY
COMMUNITY
Start: 2017-07-25 | End: 2018-02-21 | Stop reason: SDUPTHER

## 2018-03-01 DIAGNOSIS — E78.5 HYPERLIPIDEMIA: ICD-10-CM

## 2018-03-01 DIAGNOSIS — R74.8 ABNORMAL LEVELS OF OTHER SERUM ENZYMES: ICD-10-CM

## 2018-03-01 DIAGNOSIS — I10 ESSENTIAL (PRIMARY) HYPERTENSION: ICD-10-CM

## 2018-03-06 ENCOUNTER — APPOINTMENT (OUTPATIENT)
Dept: LAB | Facility: MEDICAL CENTER | Age: 68
End: 2018-03-06
Payer: MEDICARE

## 2018-03-06 ENCOUNTER — TRANSCRIBE ORDERS (OUTPATIENT)
Dept: ADMINISTRATIVE | Facility: HOSPITAL | Age: 68
End: 2018-03-06

## 2018-03-06 DIAGNOSIS — I10 ESSENTIAL (PRIMARY) HYPERTENSION: ICD-10-CM

## 2018-03-06 DIAGNOSIS — E78.5 HYPERLIPIDEMIA: ICD-10-CM

## 2018-03-06 LAB
ALBUMIN SERPL BCP-MCNC: 4.1 G/DL (ref 3.5–5)
ALP SERPL-CCNC: 73 U/L (ref 46–116)
ALT SERPL W P-5'-P-CCNC: 35 U/L (ref 12–78)
ANION GAP SERPL CALCULATED.3IONS-SCNC: 7 MMOL/L (ref 4–13)
AST SERPL W P-5'-P-CCNC: 24 U/L (ref 5–45)
BILIRUB SERPL-MCNC: 0.6 MG/DL (ref 0.2–1)
BUN SERPL-MCNC: 19 MG/DL (ref 5–25)
CALCIUM SERPL-MCNC: 8.7 MG/DL (ref 8.3–10.1)
CHLORIDE SERPL-SCNC: 102 MMOL/L (ref 100–108)
CHOLEST SERPL-MCNC: 125 MG/DL (ref 50–200)
CO2 SERPL-SCNC: 28 MMOL/L (ref 21–32)
CREAT SERPL-MCNC: 0.91 MG/DL (ref 0.6–1.3)
GFR SERPL CREATININE-BSD FRML MDRD: 86 ML/MIN/1.73SQ M
GLUCOSE P FAST SERPL-MCNC: 112 MG/DL (ref 65–99)
HDLC SERPL-MCNC: 45 MG/DL (ref 40–60)
LDLC SERPL DIRECT ASSAY-MCNC: 78 MG/DL (ref 0–100)
POTASSIUM SERPL-SCNC: 4.2 MMOL/L (ref 3.5–5.3)
PROT SERPL-MCNC: 7.3 G/DL (ref 6.4–8.2)
SODIUM SERPL-SCNC: 137 MMOL/L (ref 136–145)
TRIGL SERPL-MCNC: 73 MG/DL

## 2018-03-06 PROCEDURE — 83721 ASSAY OF BLOOD LIPOPROTEIN: CPT

## 2018-03-06 PROCEDURE — 80061 LIPID PANEL: CPT

## 2018-03-06 PROCEDURE — 36415 COLL VENOUS BLD VENIPUNCTURE: CPT

## 2018-03-06 PROCEDURE — 80053 COMPREHEN METABOLIC PANEL: CPT

## 2018-03-11 PROBLEM — K43.9 ABDOMINAL WALL HERNIA: Status: ACTIVE | Noted: 2017-11-13

## 2018-03-11 PROBLEM — R74.01 ELEVATED ALT MEASUREMENT: Status: ACTIVE | Noted: 2017-09-20

## 2018-03-11 PROBLEM — K80.20 CHOLELITHIASES: Chronic | Status: ACTIVE | Noted: 2017-11-13

## 2018-03-11 PROBLEM — R74.01 ELEVATED AST (SGOT): Status: ACTIVE | Noted: 2017-09-20

## 2018-03-11 PROBLEM — K80.20 CHOLELITHIASES: Status: ACTIVE | Noted: 2017-11-13

## 2018-03-13 ENCOUNTER — OFFICE VISIT (OUTPATIENT)
Dept: INTERNAL MEDICINE CLINIC | Facility: CLINIC | Age: 68
End: 2018-03-13
Payer: MEDICARE

## 2018-03-13 VITALS
DIASTOLIC BLOOD PRESSURE: 74 MMHG | WEIGHT: 167.4 LBS | RESPIRATION RATE: 14 BRPM | SYSTOLIC BLOOD PRESSURE: 118 MMHG | BODY MASS INDEX: 26.9 KG/M2 | HEIGHT: 66 IN | HEART RATE: 72 BPM

## 2018-03-13 DIAGNOSIS — I25.10 CORONARY ARTERY DISEASE INVOLVING NATIVE HEART WITHOUT ANGINA PECTORIS, UNSPECIFIED VESSEL OR LESION TYPE: Chronic | ICD-10-CM

## 2018-03-13 DIAGNOSIS — I65.23 ASYMPTOMATIC BILATERAL CAROTID ARTERY STENOSIS: Chronic | ICD-10-CM

## 2018-03-13 DIAGNOSIS — N40.0 BENIGN PROSTATIC HYPERPLASIA, UNSPECIFIED WHETHER LOWER URINARY TRACT SYMPTOMS PRESENT: Chronic | ICD-10-CM

## 2018-03-13 DIAGNOSIS — E78.2 MIXED HYPERLIPIDEMIA: Chronic | ICD-10-CM

## 2018-03-13 DIAGNOSIS — I10 ESSENTIAL HYPERTENSION: Primary | Chronic | ICD-10-CM

## 2018-03-13 DIAGNOSIS — R73.9 HYPERGLYCEMIA: Chronic | ICD-10-CM

## 2018-03-13 DIAGNOSIS — G43.909 MIGRAINE WITHOUT STATUS MIGRAINOSUS, NOT INTRACTABLE, UNSPECIFIED MIGRAINE TYPE: Chronic | ICD-10-CM

## 2018-03-13 PROBLEM — K42.9 UMBILICAL HERNIA: Status: ACTIVE | Noted: 2018-03-13

## 2018-03-13 LAB — SL AMB POCT HEMOGLOBIN AIC: 5.3

## 2018-03-13 PROCEDURE — 83036 HEMOGLOBIN GLYCOSYLATED A1C: CPT | Performed by: INTERNAL MEDICINE

## 2018-03-13 PROCEDURE — 99214 OFFICE O/P EST MOD 30 MIN: CPT | Performed by: INTERNAL MEDICINE

## 2018-03-13 RX ORDER — DIPHENHYDRAMINE HCL 25 MG
TABLET ORAL DAILY
COMMUNITY

## 2018-03-13 RX ORDER — ROSUVASTATIN CALCIUM 10 MG/1
1 TABLET, COATED ORAL
COMMUNITY
Start: 2013-06-05 | End: 2018-12-07 | Stop reason: SDUPTHER

## 2018-03-13 RX ORDER — ASPIRIN 81 MG/1
81 TABLET, CHEWABLE ORAL DAILY
COMMUNITY

## 2018-03-13 RX ORDER — NIACIN 500 MG/1
2 TABLET, EXTENDED RELEASE ORAL 2 TIMES DAILY
COMMUNITY
Start: 2014-05-02 | End: 2019-02-24 | Stop reason: SDUPTHER

## 2018-03-13 RX ORDER — CHLORAL HYDRATE 500 MG
CAPSULE ORAL DAILY
COMMUNITY

## 2018-03-13 RX ORDER — NAPROXEN 500 MG/1
250 TABLET ORAL
COMMUNITY
End: 2018-05-04 | Stop reason: SDUPTHER

## 2018-03-13 NOTE — PROGRESS NOTES
Assessment/Plan:  1  Umbilical hernia-small-monitor  2  Dupuytrens contracture of the left 5th finger-orthopedics wants to monitor at present-had success with treatment of 1 of the other hand  3  Elevated liver enzymes-noted in the past   Had decreased with decrease in alcohol use  Hepatitis B and hepatitis  C testing negative  CT scan shows unremarkable liver, gallstone abdominal wall hernia  Improved on lower dose of Crestor liver enzymes now normal  4  Abdominal wall hernia on CT scan-no treatment needed  Also has associated diastasis recti  5  Cholelithiasis with 1 stone-has very infrequent symptoms-this point monitoring  6  Coronary artery disease-had a CABG in the year 2006  Myoview in September 2017 normal-monitor  7  Hematospermia-felt related to underlying BPH-she is monitored by Urology  8  Hyperlipidemia-treating aggressively with his known CAD  On a statin and niacin  As noted recently dose of Crestor decreased because of elevated liver enzymes-adequate numbers at this point  9  Hyperglycemia-fasting sugar 112 but    All other problems as per note of December 2016      MEDICAL REGIMEN:      Crestor 10 milligrams 4 days per week, coenzyme Q10, baby aspirin daily, Niaspan 500 milligrams b i d , Pepcid 20 milligrams daily, Naprosyn 500 milligrams-1 and half tablets at onset of headache, infrequent use of Fioricet, topical metronidazole 0 75% b i d  p r n  to face    Appointment in 6 months with prior chemistry profile, cholesterol profile  No problem-specific Assessment & Plan notes found for this encounter         Diagnoses and all orders for this visit:    Essential hypertension    Coronary artery disease involving native heart without angina pectoris, unspecified vessel or lesion type    Asymptomatic bilateral carotid artery stenosis    Migraine without status migrainosus, not intractable, unspecified migraine type    Mixed hyperlipidemia    Hyperglycemia  -     POCT hemoglobin A1c    Benign prostatic hyperplasia, unspecified whether lower urinary tract symptoms present    Other orders  -     rosuvastatin (CRESTOR) 10 MG tablet; Take 1 tablet by mouth Take 1 tablet 4 times a week  -     Coenzyme Q10 (COQ-10) 100 MG CAPS; Take by mouth  -     niacin (NIASPAN) 500 mg CR tablet; Take 2 tablets by mouth 2 (two) times a day  -     diphenhydrAMINE (BENADRYL) 25 mg tablet; Take by mouth  -     Omega-3 Fatty Acids (FISH OIL) 1,000 mg; Take by mouth daily  -     aspirin 81 mg chewable tablet; Chew 81 mg daily  -     naproxen (NAPROSYN) 500 mg tablet; Take 250 mg by mouth Take 1 and one half tablets at onset of headaches          Subjective:      Patient ID: Zak Cancino is a 69 y o  male  He is working less trying to more free time  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  He was identified on ultrasound of the right upper quadrant have a single gallstone without other issues  He says he has not had an episode of abdominal pain in over 6 months pre wants to monitor  He has significant Dupuytrens contracture of the left 5th finger  He was told by hand surgeon to monitor this    He remains on a statin  He is taking Crestor as 40 milligrams per week  He is tolerating that  Liver enzymes are normal   Laboratory testing done prior to this visit shows an LDL of 78 HDL 45 triglycerides 73 cholesterol 125 chemistry profile normal other than a sugar of 112  His A1c in the office today was normal at 5 3  There was an issue previously of elevated liver enzymes-some this may been from his statin  Liver enzymes prior to this visit normal   Prior CT scan shows unremarkable liver  He has known hypertension  BP adequately controlled on current regimen  Avoiding salt and decongestants  Denies hematemesis pounding of his heart sweats and headache      This patient has a known history of vascular disease   We discussed again optimal control of risk factors including hyperlipidemia, hypertension, and diabetes mellitus  This patient denies any episodes of weakness of one arm or leg compared to the other, numbness of one arm or leg compared to the other, blurred or double vision, or difficulty with speech  This patient denies any claudication symptoms of arms or legs  This patient denies any chest pain or pressure with activity  There has been no recent evidence of abrupt onset of back pain to suggest potential abdominal aortic aneurysm  We also again reviewed contributing factors of atherosclerosis-including tobacco abuse, hypertension, diabetes mellitus, hyperlipidemia, family history and age  This patient understands the whole concept of risk factors and the need for more aggressive treatment of them as compared to patients who do not have significant underlying atherosclerosis  His major risk factors are his hyperlipidemia history borderline hypertension  Not a smoker and does not have diabetes            The following portions of the patient's history were reviewed and updated as appropriate: current medications, past family history, past medical history, past social history, past surgical history and problem list     Review of Systems   Constitutional: Negative  Respiratory: Negative  Cardiovascular: Negative  Gastrointestinal: Negative  Endocrine: Negative  Genitourinary: Negative  Nocturia x1   Musculoskeletal: Negative  Neurological: Negative  Hematological: Negative  Psychiatric/Behavioral: Negative  Objective:      /74   Pulse 72   Resp 14   Ht 5' 6" (1 676 m)   Wt 75 9 kg (167 lb 6 4 oz)   BMI 27 02 kg/m²          Physical Exam   Constitutional: He is oriented to person, place, and time  He appears well-developed and well-nourished  No distress  HENT:   Head: Normocephalic and atraumatic     Right Ear: External ear normal    Left Ear: External ear normal    Nose: Nose normal    Mouth/Throat: Oropharynx is clear and moist  No oropharyngeal exudate  Eyes: Conjunctivae and EOM are normal  Pupils are equal, round, and reactive to light  Right eye exhibits no discharge  Left eye exhibits no discharge  No scleral icterus  Neck: Normal range of motion  Neck supple  No JVD present  No tracheal deviation present  No thyromegaly present  Cardiovascular: Normal rate, regular rhythm, normal heart sounds and intact distal pulses  Exam reveals no gallop and no friction rub  No murmur heard  Pulmonary/Chest: Effort normal and breath sounds normal  No stridor  No respiratory distress  He has no wheezes  He has no rales  He exhibits no tenderness  Scar from prior CABG   Abdominal: Bowel sounds are normal  He exhibits no distension and no mass  There is no tenderness  There is no rebound and no guarding  Small umbilical hernia   Genitourinary: Rectal exam shows guaiac negative stool  No penile tenderness  Musculoskeletal: Normal range of motion  He exhibits no edema, tenderness or deformity  Prominent Dupuytrens contractue of left 5th finger   Lymphadenopathy:     He has no cervical adenopathy  Neurological: He is alert and oriented to person, place, and time  He has normal reflexes  He displays normal reflexes  No cranial nerve deficit  He exhibits normal muscle tone  Coordination normal    Skin: Skin is warm and dry  No rash noted  He is not diaphoretic  No erythema  No pallor  Psychiatric: He has a normal mood and affect  His behavior is normal  Judgment and thought content normal    Vitals reviewed

## 2018-05-04 DIAGNOSIS — G43.909 MIGRAINE WITHOUT STATUS MIGRAINOSUS, NOT INTRACTABLE, UNSPECIFIED MIGRAINE TYPE: Primary | ICD-10-CM

## 2018-05-05 RX ORDER — NAPROXEN 500 MG/1
TABLET ORAL
Qty: 30 TABLET | Refills: 0 | Status: SHIPPED | OUTPATIENT
Start: 2018-05-05 | End: 2018-05-27 | Stop reason: SDUPTHER

## 2018-05-27 DIAGNOSIS — G43.909 MIGRAINE WITHOUT STATUS MIGRAINOSUS, NOT INTRACTABLE, UNSPECIFIED MIGRAINE TYPE: ICD-10-CM

## 2018-05-29 RX ORDER — NAPROXEN 500 MG/1
TABLET ORAL
Qty: 30 TABLET | Refills: 0 | Status: SHIPPED | OUTPATIENT
Start: 2018-05-29 | End: 2018-06-15 | Stop reason: SDUPTHER

## 2018-06-15 DIAGNOSIS — G43.909 MIGRAINE WITHOUT STATUS MIGRAINOSUS, NOT INTRACTABLE, UNSPECIFIED MIGRAINE TYPE: ICD-10-CM

## 2018-06-15 RX ORDER — NAPROXEN 500 MG/1
TABLET ORAL
Qty: 30 TABLET | Refills: 0 | Status: SHIPPED | OUTPATIENT
Start: 2018-06-15 | End: 2020-02-18 | Stop reason: ALTCHOICE

## 2018-09-12 ENCOUNTER — APPOINTMENT (OUTPATIENT)
Dept: LAB | Facility: MEDICAL CENTER | Age: 68
End: 2018-09-12
Payer: MEDICARE

## 2018-09-12 DIAGNOSIS — E78.2 MIXED HYPERLIPIDEMIA: Chronic | ICD-10-CM

## 2018-09-12 DIAGNOSIS — R73.9 HYPERGLYCEMIA: Chronic | ICD-10-CM

## 2018-09-12 DIAGNOSIS — I10 ESSENTIAL HYPERTENSION: Chronic | ICD-10-CM

## 2018-09-12 LAB
ALBUMIN SERPL BCP-MCNC: 3.9 G/DL (ref 3.5–5)
ALP SERPL-CCNC: 89 U/L (ref 46–116)
ALT SERPL W P-5'-P-CCNC: 50 U/L (ref 12–78)
ANION GAP SERPL CALCULATED.3IONS-SCNC: 7 MMOL/L (ref 4–13)
AST SERPL W P-5'-P-CCNC: 30 U/L (ref 5–45)
BILIRUB SERPL-MCNC: 0.81 MG/DL (ref 0.2–1)
BUN SERPL-MCNC: 20 MG/DL (ref 5–25)
CALCIUM SERPL-MCNC: 8.7 MG/DL (ref 8.3–10.1)
CHLORIDE SERPL-SCNC: 104 MMOL/L (ref 100–108)
CHOLEST SERPL-MCNC: 171 MG/DL (ref 50–200)
CO2 SERPL-SCNC: 26 MMOL/L (ref 21–32)
CREAT SERPL-MCNC: 0.94 MG/DL (ref 0.6–1.3)
EST. AVERAGE GLUCOSE BLD GHB EST-MCNC: 111 MG/DL
GFR SERPL CREATININE-BSD FRML MDRD: 82 ML/MIN/1.73SQ M
GLUCOSE P FAST SERPL-MCNC: 108 MG/DL (ref 65–99)
HBA1C MFR BLD: 5.5 % (ref 4.2–6.3)
HDLC SERPL-MCNC: 47 MG/DL (ref 40–60)
LDLC SERPL DIRECT ASSAY-MCNC: 112 MG/DL (ref 0–100)
POTASSIUM SERPL-SCNC: 3.7 MMOL/L (ref 3.5–5.3)
PROT SERPL-MCNC: 8 G/DL (ref 6.4–8.2)
SODIUM SERPL-SCNC: 137 MMOL/L (ref 136–145)
TSH SERPL DL<=0.05 MIU/L-ACNC: 2.1 UIU/ML

## 2018-09-12 PROCEDURE — 80053 COMPREHEN METABOLIC PANEL: CPT

## 2018-09-12 PROCEDURE — 84443 ASSAY THYROID STIM HORMONE: CPT

## 2018-09-12 PROCEDURE — 82465 ASSAY BLD/SERUM CHOLESTEROL: CPT

## 2018-09-12 PROCEDURE — 83718 ASSAY OF LIPOPROTEIN: CPT

## 2018-09-12 PROCEDURE — 83036 HEMOGLOBIN GLYCOSYLATED A1C: CPT

## 2018-09-12 PROCEDURE — 36415 COLL VENOUS BLD VENIPUNCTURE: CPT

## 2018-09-12 PROCEDURE — 83721 ASSAY OF BLOOD LIPOPROTEIN: CPT

## 2018-09-19 ENCOUNTER — OFFICE VISIT (OUTPATIENT)
Dept: INTERNAL MEDICINE CLINIC | Facility: CLINIC | Age: 68
End: 2018-09-19
Payer: MEDICARE

## 2018-09-19 VITALS
HEART RATE: 68 BPM | DIASTOLIC BLOOD PRESSURE: 70 MMHG | SYSTOLIC BLOOD PRESSURE: 110 MMHG | RESPIRATION RATE: 14 BRPM | BODY MASS INDEX: 26.57 KG/M2 | WEIGHT: 164.6 LBS

## 2018-09-19 DIAGNOSIS — E78.01 FAMILIAL HYPERCHOLESTEROLEMIA: Primary | ICD-10-CM

## 2018-09-19 DIAGNOSIS — E55.9 VITAMIN D DEFICIENCY: ICD-10-CM

## 2018-09-19 DIAGNOSIS — R19.7 DIARRHEA, UNSPECIFIED TYPE: ICD-10-CM

## 2018-09-19 PROCEDURE — 99214 OFFICE O/P EST MOD 30 MIN: CPT | Performed by: INTERNAL MEDICINE

## 2018-09-19 NOTE — PROGRESS NOTES
Assessment/Plan:  1  Health maintenance - will be receiving influenza vaccine  2  Health maintenance -given prescription for Shingrix vaccine   3  Elevated liver enzymes-noted in the past   Had decreased with decrease in alcohol use  Hepatitis B and hepatitis  C testing negative  CT scan shows unremarkable liver, gallstone abdominal wall hernia  Improved on lower dose of Crestor liver enzymes now normal  4  Abdominal wall hernia on CT scan-no treatment needed  Also has associated diastasis recti  5  Cholelithiasis with 1 stone-has very infrequent symptoms-this point monitoring  6  Coronary artery disease-had a CABG in the year 2006  Myoview in September 2017 normal-monitor  7  Hematospermia-felt related to underlying BPH-she is monitored by Urology  8  Hyperlipidemia-treating aggressively with his known CAD  On a statin and niacin  As noted recently dose of Crestor decreased because of elevated liver enzymes- Zetia added as 10 milligrams daily  9  Hyperglycemia-fasting sugar 112   10  Recent noted diarrhea -present for 3-4 weeks  2-3 stools per day  May been exposed to raw hamburger meat  Stool for Clostridium difficile culture O&P and Giardia ordered  If these were all negative and symptoms persist she may require additional investigation     All other problems as per note of December 2016        MEDICAL REGIMEN:                                                  Crestor 10 milligrams 4 days per week, coenzyme Q10, baby aspirin daily, Niaspan 500 milligrams b i d , Pepcid 20 milligrams daily, Naprosyn 500 milligrams-1 and half tablets at onset of headache, infrequent use of Fioricet, topical metronidazole 0 75% b i d  p r n  to face   , Zetia 10 milligrams daily    Await results of stool studies  Appointment in 6 months with prior labs    Addendum-stool for Clostridium difficile, culture, O and P, Giardia all normal  No problem-specific Assessment & Plan notes found for this encounter  Diagnoses and all orders for this visit:    Familial hypercholesterolemia  -     Comprehensive metabolic panel; Future  -     Cholesterol, total; Future  -     Triglycerides; Future  -     HDL cholesterol; Future  -     LDL cholesterol, direct; Future  -     Vitamin D 25 hydroxy; Future  -     Stool Enteric Bacterial Panel by PCR; Future  -     Clostridium difficile toxin by PCR; Future  -     Giardia antigen; Future  -     Ova and parasite examination; Future  -     Ova and parasite examination; Future    Vitamin D deficiency  -     Comprehensive metabolic panel; Future  -     Cholesterol, total; Future  -     Triglycerides; Future  -     HDL cholesterol; Future  -     LDL cholesterol, direct; Future  -     Vitamin D 25 hydroxy; Future  -     Stool Enteric Bacterial Panel by PCR; Future  -     Clostridium difficile toxin by PCR; Future  -     Giardia antigen; Future  -     Ova and parasite examination; Future  -     Ova and parasite examination; Future    Diarrhea, unspecified type  -     Comprehensive metabolic panel; Future  -     Cholesterol, total; Future  -     Triglycerides; Future  -     HDL cholesterol; Future  -     LDL cholesterol, direct; Future  -     Vitamin D 25 hydroxy; Future  -     Stool Enteric Bacterial Panel by PCR; Future  -     Clostridium difficile toxin by PCR; Future  -     Giardia antigen; Future  -     Ova and parasite examination; Future  -     Ova and parasite examination; Future       108    Subjective:      Patient ID: Randall Tran is a 77 y o  male  Reviewed multiple issues today  He said he had been cooking hamburger a month ago in thinks he may have ingested raw hamburger meat  Since then having mild diarrhea -2-3 loose stools per day without blood  Some abdominal cramping  Because of the above stool studies ordered  He will minimize caffeine and dairy products  He has known hyperlipidemia with goal LDL   He has issues with higher dose of Crestor    We elected to add Zetia today  Labs done prior to this visit show an  HDL 47 TSH normal cholesterol 171 chemistry profile normal other than a fasting sugar of 108  As noted recent A1c was normal   Literature has a goal LDL of under 100 although optimally can potentially even be 70  He had issues with elevated liver enzymes and higher dose of Crestor    We reviewed the new herpes zoster vaccine - he understands this is a 2 part vaccine  He understands that is more effective than prior vaccine  He understands there are higher incidence of side effects  He has known coronary artery disease  He denies any symptoms of chest pain or pressure  Recently saw his cardiologist who felt he was stable  He has known cholelithiasis  He said he had 1 episode of minimal pain  Wants to observe at present  This patient denies any systemic symptoms  Specifically there has been no evidence of fever, night sweats, significant weight loss or significant decrease in appetite  This patient has a known history of vascular disease  We discussed again optimal control of risk factors including hyperlipidemia, hypertension, and diabetes mellitus  This patient denies any episodes of weakness of one arm or leg compared to the other, numbness of one arm or leg compared to the other, blurred or double vision, or difficulty with speech  This patient denies any claudication symptoms of arms or legs  This patient denies any chest pain or pressure with activity  There has been no recent evidence of abrupt onset of back pain to suggest potential abdominal aortic aneurysm  We also again reviewed contributing factors of atherosclerosis-including tobacco abuse, hypertension, diabetes mellitus, hyperlipidemia, family history and age   This patient understands the whole concept of risk factors and the need for more aggressive treatment of them as compared to patients who do not have significant underlying atherosclerosis  Major risk factors hyperlipidemia            The following portions of the patient's history were reviewed and updated as appropriate: current medications, past family history, past medical history, past social history, past surgical history and problem list     Review of Systems   Constitutional: Negative  Respiratory: Negative  Cardiovascular: Negative  Gastrointestinal: Positive for diarrhea  Endocrine: Negative  Genitourinary: Negative  Musculoskeletal: Negative  Neurological: Negative  Hematological: Negative  Psychiatric/Behavioral: Negative  Objective:      /70   Pulse 68   Resp 14   Wt 74 7 kg (164 lb 9 6 oz)   BMI 26 57 kg/m²          Physical Exam   Constitutional: He is oriented to person, place, and time  He appears well-developed and well-nourished  No distress  HENT:   Head: Normocephalic and atraumatic  Right Ear: External ear normal    Left Ear: External ear normal    Nose: Nose normal    Mouth/Throat: Oropharynx is clear and moist  No oropharyngeal exudate  Eyes: Conjunctivae and EOM are normal  Pupils are equal, round, and reactive to light  Right eye exhibits no discharge  Left eye exhibits no discharge  No scleral icterus  Neck: No JVD present  No tracheal deviation present  No thyromegaly present  Cardiovascular: Normal rate, regular rhythm, normal heart sounds and intact distal pulses  Exam reveals no gallop and no friction rub  No murmur heard  Pulmonary/Chest: Effort normal and breath sounds normal  No stridor  No respiratory distress  He has no wheezes  He exhibits no tenderness  Abdominal: Bowel sounds are normal  He exhibits no distension and no mass  There is no tenderness  There is no rebound and no guarding  Genitourinary: Rectum normal, prostate normal and penis normal  Rectal exam shows guaiac negative stool  Musculoskeletal: Normal range of motion   He exhibits no edema, tenderness or deformity  Lymphadenopathy:     He has no cervical adenopathy  Neurological: He is alert and oriented to person, place, and time  He has normal reflexes  No cranial nerve deficit  He exhibits normal muscle tone  Coordination normal    Skin: Skin is warm and dry  No rash noted  He is not diaphoretic  No erythema  No pallor  Psychiatric: He has a normal mood and affect  His behavior is normal  Judgment and thought content normal    Vitals reviewed

## 2018-11-12 ENCOUNTER — TRANSCRIBE ORDERS (OUTPATIENT)
Dept: LAB | Facility: MEDICAL CENTER | Age: 68
End: 2018-11-12

## 2018-11-12 ENCOUNTER — APPOINTMENT (OUTPATIENT)
Dept: LAB | Facility: MEDICAL CENTER | Age: 68
End: 2018-11-12
Payer: MEDICARE

## 2018-11-12 DIAGNOSIS — R19.7 DIARRHEA, UNSPECIFIED TYPE: ICD-10-CM

## 2018-11-12 DIAGNOSIS — E55.9 VITAMIN D DEFICIENCY: ICD-10-CM

## 2018-11-12 DIAGNOSIS — N40.1 ENLARGED PROSTATE WITH URINARY OBSTRUCTION: Primary | ICD-10-CM

## 2018-11-12 DIAGNOSIS — N13.8 ENLARGED PROSTATE WITH URINARY OBSTRUCTION: Primary | ICD-10-CM

## 2018-11-12 DIAGNOSIS — E78.01 FAMILIAL HYPERCHOLESTEROLEMIA: ICD-10-CM

## 2018-11-12 PROCEDURE — 87209 SMEAR COMPLEX STAIN: CPT

## 2018-11-12 PROCEDURE — 87329 GIARDIA AG IA: CPT

## 2018-11-12 PROCEDURE — 87493 C DIFF AMPLIFIED PROBE: CPT

## 2018-11-12 PROCEDURE — 87177 OVA AND PARASITES SMEARS: CPT

## 2018-11-13 ENCOUNTER — APPOINTMENT (OUTPATIENT)
Dept: LAB | Facility: MEDICAL CENTER | Age: 68
End: 2018-11-13
Payer: MEDICARE

## 2018-11-13 LAB — C DIFF TOX GENS STL QL NAA+PROBE: NORMAL

## 2018-11-13 PROCEDURE — 87505 NFCT AGENT DETECTION GI: CPT

## 2018-11-14 LAB
CAMPYLOBACTER DNA SPEC NAA+PROBE: NORMAL
SALMONELLA DNA SPEC QL NAA+PROBE: NORMAL
SHIGA TOXIN STX GENE SPEC NAA+PROBE: NORMAL
SHIGELLA DNA SPEC QL NAA+PROBE: NORMAL

## 2018-11-15 LAB — G LAMBLIA AG STL QL IA: NEGATIVE

## 2018-11-16 LAB — O+P STL CONC: NORMAL

## 2018-11-19 ENCOUNTER — TELEPHONE (OUTPATIENT)
Dept: INTERNAL MEDICINE CLINIC | Facility: CLINIC | Age: 68
End: 2018-11-19

## 2018-11-19 NOTE — TELEPHONE ENCOUNTER
----- Message from Karly Don MD sent at 11/17/2018  7:34 AM EST -----  Please call patient and let him know all stool testing normal   Please find out how he is doing and let me know

## 2018-11-21 ENCOUNTER — APPOINTMENT (OUTPATIENT)
Dept: LAB | Facility: MEDICAL CENTER | Age: 68
End: 2018-11-21
Payer: MEDICARE

## 2018-11-21 DIAGNOSIS — N13.8 ENLARGED PROSTATE WITH URINARY OBSTRUCTION: ICD-10-CM

## 2018-11-21 DIAGNOSIS — N40.1 ENLARGED PROSTATE WITH URINARY OBSTRUCTION: ICD-10-CM

## 2018-11-21 LAB
BUN SERPL-MCNC: 18 MG/DL (ref 5–25)
CREAT SERPL-MCNC: 1.02 MG/DL (ref 0.6–1.3)
GFR SERPL CREATININE-BSD FRML MDRD: 74 ML/MIN/1.73SQ M
PSA SERPL-MCNC: 1.7 NG/ML (ref 0–4)

## 2018-11-21 PROCEDURE — 84153 ASSAY OF PSA TOTAL: CPT

## 2018-11-21 PROCEDURE — 84520 ASSAY OF UREA NITROGEN: CPT

## 2018-11-21 PROCEDURE — 36415 COLL VENOUS BLD VENIPUNCTURE: CPT

## 2018-11-21 PROCEDURE — 82565 ASSAY OF CREATININE: CPT

## 2018-12-07 DIAGNOSIS — E78.2 MIXED HYPERLIPIDEMIA: Primary | ICD-10-CM

## 2018-12-07 RX ORDER — ROSUVASTATIN CALCIUM 10 MG/1
10 TABLET, COATED ORAL
Qty: 60 TABLET | Refills: 3 | Status: SHIPPED | OUTPATIENT
Start: 2018-12-08 | End: 2020-01-27

## 2018-12-17 ENCOUNTER — TRANSCRIBE ORDERS (OUTPATIENT)
Dept: LAB | Facility: CLINIC | Age: 68
End: 2018-12-17

## 2019-01-03 DIAGNOSIS — K21.9 GASTROESOPHAGEAL REFLUX DISEASE, ESOPHAGITIS PRESENCE NOT SPECIFIED: ICD-10-CM

## 2019-01-03 RX ORDER — FAMOTIDINE 20 MG/1
20 TABLET, FILM COATED ORAL DAILY
Qty: 90 TABLET | Refills: 3 | Status: SHIPPED | OUTPATIENT
Start: 2019-01-03 | End: 2022-02-21

## 2019-02-24 DIAGNOSIS — E78.01 FAMILIAL HYPERCHOLESTEROLEMIA: Primary | ICD-10-CM

## 2019-02-25 RX ORDER — NIACIN 500 MG/1
TABLET, EXTENDED RELEASE ORAL
Qty: 180 TABLET | Refills: 3 | Status: SHIPPED | OUTPATIENT
Start: 2019-02-25 | End: 2019-08-26 | Stop reason: SDUPTHER

## 2019-02-26 ENCOUNTER — OFFICE VISIT (OUTPATIENT)
Dept: CARDIOLOGY CLINIC | Facility: CLINIC | Age: 69
End: 2019-02-26
Payer: MEDICARE

## 2019-02-26 VITALS
DIASTOLIC BLOOD PRESSURE: 70 MMHG | WEIGHT: 164 LBS | HEIGHT: 65 IN | SYSTOLIC BLOOD PRESSURE: 120 MMHG | HEART RATE: 70 BPM | RESPIRATION RATE: 18 BRPM | BODY MASS INDEX: 27.32 KG/M2

## 2019-02-26 DIAGNOSIS — I10 ESSENTIAL HYPERTENSION: Chronic | ICD-10-CM

## 2019-02-26 DIAGNOSIS — E78.2 MIXED HYPERLIPIDEMIA: Chronic | ICD-10-CM

## 2019-02-26 DIAGNOSIS — I25.10 CORONARY ARTERY DISEASE INVOLVING NATIVE HEART WITHOUT ANGINA PECTORIS, UNSPECIFIED VESSEL OR LESION TYPE: Primary | Chronic | ICD-10-CM

## 2019-02-26 PROCEDURE — 99213 OFFICE O/P EST LOW 20 MIN: CPT | Performed by: INTERNAL MEDICINE

## 2019-02-26 RX ORDER — EZETIMIBE 10 MG/1
10 TABLET ORAL EVERY MORNING
Refills: 2 | COMMUNITY
Start: 2018-12-11 | End: 2019-08-20 | Stop reason: SDUPTHER

## 2019-02-26 NOTE — PROGRESS NOTES
Assessment/Plan:    Coronary artery disease  Coronary artery disease, stable with no symptoms of angina or any signs of heart failure  Hypertension  Hypertension, stable and adequately controlled  Hyperlipidemia  Hyperlipidemia, stable  Will continue the same regimen  Diagnoses and all orders for this visit:    Coronary artery disease involving native heart without angina pectoris, unspecified vessel or lesion type    Essential hypertension    Mixed hyperlipidemia    Other orders  -     ezetimibe (ZETIA) 10 mg tablet; 10 mg every morning          Subjective:  Feels rather well  Patient ID: Julianne Campbell is a 77 y o  male  The patient presented to this office for the purpose of cardiac follow-up  He has a history of coronary artery disease, hypertension and hyperlipidemia  He has been feeling rather well denying any symptoms of chest pain, shortness of breath, palpitation, dizziness or lightheadedness  He has no leg edema  The following portions of the patient's history were reviewed and updated as appropriate: allergies, current medications, past family history, past medical history, past social history, past surgical history and problem list     Review of Systems   Respiratory: Negative for apnea, cough, chest tightness, shortness of breath and wheezing  Cardiovascular: Negative for chest pain, palpitations and leg swelling  Gastrointestinal: Negative for abdominal pain  Neurological: Negative for dizziness and light-headedness  Objective:  Stable cardiac-wise  /70 (BP Location: Right arm, Patient Position: Sitting)   Pulse 70   Resp 18   Ht 5' 4 5" (1 638 m)   Wt 74 4 kg (164 lb)   BMI 27 72 kg/m²          Physical Exam   Constitutional: He is oriented to person, place, and time  He appears well-developed and well-nourished  No distress  HENT:   Head: Normocephalic  Eyes: Pupils are equal, round, and reactive to light  Neck: Normal range of motion  No JVD present  No thyromegaly present  Cardiovascular: Normal rate, regular rhythm, S1 normal and S2 normal  Exam reveals no gallop and no friction rub  No murmur heard  Pulmonary/Chest: Effort normal and breath sounds normal  No respiratory distress  He has no wheezes  He has no rales  He exhibits no tenderness  Abdominal: Soft  Musculoskeletal: Normal range of motion  He exhibits no edema, tenderness or deformity  Neurological: He is alert and oriented to person, place, and time  Skin: Skin is warm and dry  He is not diaphoretic  Psychiatric: He has a normal mood and affect  Vitals reviewed

## 2019-02-26 NOTE — LETTER
February 26, 2019     Rio Li MD  [address]  [address] [place] [address], [address]    Patient: Garrett Mueller   YOB: 1950   Date of Visit: 2/26/2019       Dear Dr Andie Ordoñez: Thank you for referring Vira Juan to me for evaluation  Below are my notes for this consultation  If you have questions, please do not hesitate to call me  I look forward to following your patient along with you  Sincerely,        Heaven Warren MD        CC: No Recipients  Heaven Warren MD  2/26/2019  8:53 AM  Sign at close encounter  Assessment/Plan:    Coronary artery disease  Coronary artery disease, stable with no symptoms of angina or any signs of heart failure  Hypertension  Hypertension, stable and adequately controlled  Hyperlipidemia  Hyperlipidemia, stable  Will continue the same regimen  Diagnoses and all orders for this visit:    Coronary artery disease involving native heart without angina pectoris, unspecified vessel or lesion type    Essential hypertension    Mixed hyperlipidemia    Other orders  -     ezetimibe (ZETIA) 10 mg tablet; 10 mg every morning          Subjective:  Feels rather well  Patient ID: Garrett Mueller is a 77 y o  male  The patient presented to this office for the purpose of cardiac follow-up  He has a history of coronary artery disease, hypertension and hyperlipidemia  He has been feeling rather well denying any symptoms of chest pain, shortness of breath, palpitation, dizziness or lightheadedness  He has no leg edema  The following portions of the patient's history were reviewed and updated as appropriate: allergies, current medications, past family history, past medical history, past social history, past surgical history and problem list     Review of Systems   Respiratory: Negative for apnea, cough, chest tightness, shortness of breath and wheezing  Cardiovascular: Negative for chest pain, palpitations and leg swelling  Gastrointestinal: Negative for abdominal pain  Neurological: Negative for dizziness and light-headedness  Objective:  Stable cardiac-wise  /70 (BP Location: Right arm, Patient Position: Sitting)   Pulse 70   Resp 18   Ht 5' 4 5" (1 638 m)   Wt 74 4 kg (164 lb)   BMI 27 72 kg/m²           Physical Exam   Constitutional: He is oriented to person, place, and time  He appears well-developed and well-nourished  No distress  HENT:   Head: Normocephalic  Eyes: Pupils are equal, round, and reactive to light  Neck: Normal range of motion  No JVD present  No thyromegaly present  Cardiovascular: Normal rate, regular rhythm, S1 normal and S2 normal  Exam reveals no gallop and no friction rub  No murmur heard  Pulmonary/Chest: Effort normal and breath sounds normal  No respiratory distress  He has no wheezes  He has no rales  He exhibits no tenderness  Abdominal: Soft  Musculoskeletal: Normal range of motion  He exhibits no edema, tenderness or deformity  Neurological: He is alert and oriented to person, place, and time  Skin: Skin is warm and dry  He is not diaphoretic  Psychiatric: He has a normal mood and affect  Vitals reviewed

## 2019-03-15 ENCOUNTER — APPOINTMENT (OUTPATIENT)
Dept: LAB | Facility: MEDICAL CENTER | Age: 69
End: 2019-03-15
Payer: MEDICARE

## 2019-03-15 DIAGNOSIS — E55.9 VITAMIN D DEFICIENCY: ICD-10-CM

## 2019-03-15 DIAGNOSIS — R19.7 DIARRHEA, UNSPECIFIED TYPE: ICD-10-CM

## 2019-03-15 DIAGNOSIS — E78.01 FAMILIAL HYPERCHOLESTEROLEMIA: ICD-10-CM

## 2019-03-15 LAB
25(OH)D3 SERPL-MCNC: 31.4 NG/ML (ref 30–100)
ALBUMIN SERPL BCP-MCNC: 4.1 G/DL (ref 3.5–5)
ALP SERPL-CCNC: 78 U/L (ref 46–116)
ALT SERPL W P-5'-P-CCNC: 35 U/L (ref 12–78)
ANION GAP SERPL CALCULATED.3IONS-SCNC: 3 MMOL/L (ref 4–13)
AST SERPL W P-5'-P-CCNC: 25 U/L (ref 5–45)
BILIRUB SERPL-MCNC: 0.77 MG/DL (ref 0.2–1)
BUN SERPL-MCNC: 17 MG/DL (ref 5–25)
CALCIUM SERPL-MCNC: 8.8 MG/DL (ref 8.3–10.1)
CHLORIDE SERPL-SCNC: 105 MMOL/L (ref 100–108)
CHOLEST SERPL-MCNC: 109 MG/DL (ref 50–200)
CO2 SERPL-SCNC: 27 MMOL/L (ref 21–32)
CREAT SERPL-MCNC: 1.03 MG/DL (ref 0.6–1.3)
GFR SERPL CREATININE-BSD FRML MDRD: 73 ML/MIN/1.73SQ M
GLUCOSE P FAST SERPL-MCNC: 90 MG/DL (ref 65–99)
HDLC SERPL-MCNC: 42 MG/DL (ref 40–60)
LDLC SERPL DIRECT ASSAY-MCNC: 55 MG/DL (ref 0–100)
POTASSIUM SERPL-SCNC: 3.9 MMOL/L (ref 3.5–5.3)
PROT SERPL-MCNC: 7.4 G/DL (ref 6.4–8.2)
SODIUM SERPL-SCNC: 135 MMOL/L (ref 136–145)
TRIGL SERPL-MCNC: 74 MG/DL

## 2019-03-15 PROCEDURE — 83721 ASSAY OF BLOOD LIPOPROTEIN: CPT

## 2019-03-15 PROCEDURE — 80061 LIPID PANEL: CPT

## 2019-03-15 PROCEDURE — 36415 COLL VENOUS BLD VENIPUNCTURE: CPT

## 2019-03-15 PROCEDURE — 80053 COMPREHEN METABOLIC PANEL: CPT

## 2019-03-15 PROCEDURE — 82306 VITAMIN D 25 HYDROXY: CPT

## 2019-03-19 ENCOUNTER — OFFICE VISIT (OUTPATIENT)
Dept: INTERNAL MEDICINE CLINIC | Facility: CLINIC | Age: 69
End: 2019-03-19
Payer: MEDICARE

## 2019-03-19 VITALS
RESPIRATION RATE: 14 BRPM | WEIGHT: 160 LBS | HEIGHT: 65 IN | SYSTOLIC BLOOD PRESSURE: 120 MMHG | HEART RATE: 68 BPM | BODY MASS INDEX: 26.66 KG/M2 | DIASTOLIC BLOOD PRESSURE: 70 MMHG

## 2019-03-19 DIAGNOSIS — R73.9 HYPERGLYCEMIA: Chronic | ICD-10-CM

## 2019-03-19 DIAGNOSIS — I10 ESSENTIAL HYPERTENSION: Primary | Chronic | ICD-10-CM

## 2019-03-19 DIAGNOSIS — E78.2 MIXED HYPERLIPIDEMIA: Chronic | ICD-10-CM

## 2019-03-19 DIAGNOSIS — I25.118 ATHEROSCLEROSIS OF NATIVE CORONARY ARTERY WITH OTHER FORM OF ANGINA PECTORIS, UNSPECIFIED WHETHER NATIVE OR TRANSPLANTED HEART (HCC): ICD-10-CM

## 2019-03-19 DIAGNOSIS — R19.7 DIARRHEA, UNSPECIFIED TYPE: ICD-10-CM

## 2019-03-19 DIAGNOSIS — I25.10 CORONARY ARTERY DISEASE INVOLVING NATIVE CORONARY ARTERY OF NATIVE HEART WITHOUT ANGINA PECTORIS: Chronic | ICD-10-CM

## 2019-03-19 PROCEDURE — 99213 OFFICE O/P EST LOW 20 MIN: CPT | Performed by: INTERNAL MEDICINE

## 2019-03-19 PROCEDURE — G0439 PPPS, SUBSEQ VISIT: HCPCS | Performed by: INTERNAL MEDICINE

## 2019-03-19 NOTE — PROGRESS NOTES
Assessment and Plan:    Problem List Items Addressed This Visit     None        Health Maintenance Due   Topic Date Due    Hepatitis C Screening  1950    Medicare Annual Wellness Visit (AWV)  1950    BMI: Followup Plan  03/29/1966    DTaP,Tdap,and Td Vaccines (1 - Tdap) 03/29/1969    Fall Risk  03/29/2013         HPI:  Keegan Hair is a 77 y o  male here for his Subsequent Wellness Visit      Patient Active Problem List   Diagnosis    Abdominal wall hernia    Anemia    Arthritis    Asymptomatic carotid artery stenosis    Atherosclerosis    Cholelithiases    Benign prostate hyperplasia    Coronary artery disease    Dupuytren's contracture    Elevated ALT measurement    Elevated AST (SGOT)    Gastroesophageal reflux disease    Headache    Hyperglycemia    Hyperlipidemia    Hypertension    Migraine headache    Seasonal allergies    Vitamin D deficiency    Umbilical hernia    Diarrhea     Past Medical History:   Diagnosis Date    Arteriosclerotic heart disease     Hyperlipidemia      Past Surgical History:   Procedure Laterality Date    CATARACT EXTRACTION Left 12/2017    CORONARY ARTERY BYPASS GRAFT  2004    x4    TONSILLECTOMY       Family History   Problem Relation Age of Onset    Hypertension Mother     No Known Problems Father      Social History     Tobacco Use   Smoking Status Never Smoker   Smokeless Tobacco Never Used     Social History     Substance and Sexual Activity   Alcohol Use Yes    Comment: social      Social History     Substance and Sexual Activity   Drug Use No       Current Outpatient Medications   Medication Sig Dispense Refill    aspirin 81 mg chewable tablet Chew 81 mg daily      Coenzyme Q10 (COQ-10) 100 MG CAPS Take by mouth      diphenhydrAMINE (BENADRYL) 25 mg tablet Take by mouth      ezetimibe (ZETIA) 10 mg tablet 10 mg every morning  2    famotidine (PEPCID) 20 mg tablet Take 1 tablet (20 mg total) by mouth daily 90 tablet 3    naproxen (NAPROSYN) 500 mg tablet TAKE 1 AND ONE HALF TABLETS AT ONSET OF HEADACHES 30 tablet 0    niacin (NIASPAN) 500 mg CR tablet TAKE 1 TABLET BY MOUTH TWICE A  tablet 3    niacin 500 mg tablet Take 1 tablet (500 mg total) by mouth 2 (two) times a day 180 tablet 3    Omega-3 Fatty Acids (FISH OIL) 1,000 mg Take by mouth daily      rosuvastatin (CRESTOR) 10 MG tablet Take 1 tablet (10 mg total) by mouth 4 (four) times a week Take 1 tablet 4 times a week 60 tablet 3     No current facility-administered medications for this visit  No Known Allergies  Immunization History   Administered Date(s) Administered    INFLUENZA 11/27/2018    Influenza Quadrivalent Preservative Free 3 years and older IM 11/14/2016    Influenza Split High Dose Preservative Free IM 11/19/2015, 09/20/2017    Pneumococcal Conjugate 13-Valent 07/29/2015    Pneumococcal Polysaccharide PPV23 08/07/2014    Zoster 06/28/2016       Patient Care Team:  Michelle Ramos MD as PCP - General    Medicare Screening Tests and Risk Assessments:  Lisa Wyatt is here for his Subsequent Wellness visit  Last Medicare Wellness visit information reviewed, patient interviewed and updates made to the record today  Health Risk Assessment:  Patient rates overall health as excellent  Patient feels that their physical health rating is Same  Eyesight was rated as Same  Hearing was rated as Same  Patient feels that their emotional and mental health rating is Same  Pain experienced by patient in the last 7 days has been None  Patient states that he has experienced no weight loss or gain in last 6 months  Emotional/Mental Health:  Patient has been feeling nervous/anxious  PHQ-9 Depression Screening:    Frequency of the following problems over the past two weeks:      1  Little interest or pleasure in doing things: 0 - not at all      2  Feeling down, depressed, or hopeless: 0 - not at all  PHQ-2 Score: 0          Broken Bones/Falls:     Fall Risk Assessment:    In the past year, patient has experienced: No history of falling in past year          Bladder/Bowel:  Patient has not leaked urine accidently in the last six months  Patient reports no loss of bowel control  Immunizations:  Patient has had a flu vaccination within the last year  Patient has received a pneumonia shot  Patient has received a shingles shot  Patient has received tetanus/diphtheria shot  Home Safety:  Patient does not have trouble with stairs inside or outside of their home  Patient currently reports that there are no safety hazards present in home, working smoke alarms, working carbon monoxide detectors  Preventative Screenings:   prostate cancer screen performed, colon cancer screen completed, cholesterol screen completed, glaucoma eye exam completed,     Nutrition:  Current diet: Low Cholesterol, No Added Salt and Low Saturated Fat with servings of the following:    Medications:  Patient is currently taking over-the-counter supplements  Patient is able to manage medications  Lifestyle Choices:  Patient reports no tobacco use  Patient has not smoked or used tobacco in the past   Patient reports alcohol use  Alcohol use per week: 4-5  Patient drives a vehicle  Patient wears seat belt  Current level of exercise of physical activity described by patient as: [address]  Activities of Daily Living:  Can get out of bed by his or her self, able to dress self, able to make own meals, able to do own shopping, able to bathe self, can do own laundry/housekeeping, can manage own money, pay bills and track expenses    Previous Hospitalizations:  Hospitalization or ED visit in past 12 months  Number of hospitalizations within the last year: 1-2        Advanced Directives:  Patient has decided on a power of   Patient has spoken to designated power of     Patient has completed advanced directive  Preventative Screening/Counseling:      Cardiovascular:      General: Risks and Benefits Discussed and Screening Current          Diabetes:      General: Risks and Benefits Discussed and Screening Current          Colorectal Cancer:      General: Risks and Benefits Discussed and Screening Current          Prostate Cancer:      General: Risks and Benefits Discussed and Screening Current          Osteoporosis:      General: Risks and Benefits Discussed and Screening Not Indicated          AAA:      General: Risks and Benefits Discussed and Screening Current          Glaucoma:      General: Risks and Benefits Discussed and Screening Current          HIV:      General: Risks and Benefits Discussed and Screening Current          Hepatitis C:      General: Risks and Benefits Discussed and Screening Current        Advanced Directives:   Patient has living will for healthcare, has durable POA for healthcare, patient has an advanced directive  Information on ACP and/or AD not provided  No 5 wishes given  End of life assessment reviewed with patient       Immunizations:      Influenza: Risks & Benefits Discussed and Influenza UTD This Year      Pneumococcal: Risks & Benefits Discussed and Lifetime Vaccine Completed      Shingrix: Risks & Benefits Discussed and Shingrix Vaccine Needed Today      Hepatitis B (Low risk patients): Series Not Indicated      Zostavax: Risks & Benefits Discussed and Zostavax Vaccine UTD      TD: Risks & Benefits Discussed and Td Vaccine UTD      TDAP: Risks & Benefits Discussed

## 2019-03-19 NOTE — PROGRESS NOTES
Assessment/Plan:   1  Health maintenance - patient has prescription for Shingrix vaccine  2  Diarrhea -originally happened after ingestion of raw hamburger meat  All testing including Clostridium difficile, culture, O and P in Giardia normal   Symptoms resolved but with occasional symptoms of suspect some component of irritable bowel syndrome post infection  3   Elevated liver enzymes-noted in the past   Had decreased with decrease in alcohol use   Hepatitis B and hepatitis  C testing negative   CT scan shows unremarkable liver, gallstone abdominal wall hernia   Improved on lower dose of Crestor liver enzymes now normal  4   Abdominal wall hernia on CT scan-no treatment needed   Also has associated diastasis recti -as noted has occasional symptoms  5   Cholelithiasis with 1 stone-has very infrequent symptoms-this point monitoring  6   Coronary artery disease-had a CABG in the year 2006   Myoview in September 2017 normal-monitor  7   Hematospermia-felt related to underlying BPH-she is monitored by Urology  8   Hyperlipidemia-treating aggressively with his known CAD   On a statin and niacin   As noted recently dose of Crestor decreased because of elevated liver enzymes- Zetia added as 10 milligrams daily  9   Hyperglycemia-fasting sugar 112  previously but now normal   Hemoglobin A1c ordered prior  To next visit    All other problems as per note of December 2016        MEDICAL REGIMEN:                                                  Crestor 10 milligrams 4 days per week, coenzyme Q10, baby aspirin daily, Niaspan 500 milligrams b i d , Pepcid 20 milligrams daily, Naprosyn 500 milligrams-1 and half tablets at onset of headache, infrequent use of Fioricet, topical metronidazole 0 75% b i d  p r n  to face    Addendum -appointment in 6 months with prior chemistry profile, cholesterol profile    No problem-specific Assessment & Plan notes found for this encounter         Diagnoses and all orders for this visit:    Essential hypertension  -     Comprehensive metabolic panel; Future  -     Cholesterol, total; Future  -     HDL cholesterol; Future  -     LDL cholesterol, direct; Future  -     Triglycerides; Future  -     HEMOGLOBIN A1C W/ EAG ESTIMATION; Future    Mixed hyperlipidemia  -     Comprehensive metabolic panel; Future  -     Cholesterol, total; Future  -     HDL cholesterol; Future  -     LDL cholesterol, direct; Future  -     Triglycerides; Future  -     HEMOGLOBIN A1C W/ EAG ESTIMATION; Future    Hyperglycemia  -     Comprehensive metabolic panel; Future  -     Cholesterol, total; Future  -     HDL cholesterol; Future  -     LDL cholesterol, direct; Future  -     Triglycerides; Future  -     HEMOGLOBIN A1C W/ EAG ESTIMATION; Future    Coronary artery disease involving native coronary artery of native heart without angina pectoris  -     Comprehensive metabolic panel; Future  -     Cholesterol, total; Future  -     HDL cholesterol; Future  -     LDL cholesterol, direct; Future  -     Triglycerides; Future  -     HEMOGLOBIN A1C W/ EAG ESTIMATION; Future          Subjective:      Patient ID: Kobe Razo is a 77 y o  male  He was here for his Medicare wellness with some concerns about diarrhea  As noted he had been evaluated prior to visit with diarrhea felt related to food borne illness  Culture, O&P, Giardia, Clostridium difficile testing were all normal   He says all symptoms resolved although recently had a flare up after large amount of fruit  We reviewed that we may have some residual post infectious irritable bowel  We went over this whole concept  He  Also says rarely he has left upper quadrant pain insert positions which may be related to his known hernia on CT scan  Recent laboratory testing shows a vitamin-D level of 31 LDL of 55 HDL 42 triglycerides 74 chemistry profile normal with a creatinine 1 03 PSA done in November 2018 was 1 7      This patient denies any systemic symptoms  Specifically there has been no evidence of fever, night sweats, significant weight loss or significant decrease in appetite  He remains on a statin  He understands the difference between status associated myalgias and arthralgias from degenerative arthritis  We discussed of newer agents available and literature regarding further lowering of LDL  The following portions of the patient's history were reviewed and updated as appropriate: allergies, current medications, past family history, past medical history, past social history, past surgical history and problem list     Review of Systems   Constitutional: Negative  Respiratory: Negative  Cardiovascular: Negative  Gastrointestinal: Positive for diarrhea  Endocrine: Negative  Genitourinary: Negative  Musculoskeletal: Negative  Neurological: Negative  Hematological: Negative  Psychiatric/Behavioral: Negative  Objective:      Ht 5' 4 5" (1 638 m)   Wt 72 6 kg (160 lb)   BMI 27 04 kg/m²          Physical Exam   Constitutional: He is oriented to person, place, and time  He appears well-developed and well-nourished  No distress  HENT:   Head: Normocephalic and atraumatic  Right Ear: External ear normal    Left Ear: External ear normal    Nose: Nose normal    Mouth/Throat: Oropharynx is clear and moist  No oropharyngeal exudate  Eyes: Pupils are equal, round, and reactive to light  Conjunctivae and EOM are normal  Right eye exhibits no discharge  Left eye exhibits no discharge  No scleral icterus  Neck: Normal range of motion  Neck supple  No JVD present  No tracheal deviation present  No thyromegaly present  Cardiovascular: Normal rate, regular rhythm, normal heart sounds and intact distal pulses  Exam reveals no gallop and no friction rub  No murmur heard  Pulmonary/Chest: Effort normal and breath sounds normal  No stridor  No respiratory distress  He has no wheezes  He exhibits no tenderness      Scar from prior surgery   Abdominal: Bowel sounds are normal  He exhibits no distension and no mass  There is no tenderness  There is no rebound and no guarding  Genitourinary: Rectal exam shows guaiac negative stool  Musculoskeletal: Normal range of motion  He exhibits no edema, tenderness or deformity  Lymphadenopathy:     He has no cervical adenopathy  Neurological: He is alert and oriented to person, place, and time  He has normal reflexes  He displays normal reflexes  No cranial nerve deficit  He exhibits normal muscle tone  Coordination normal    Skin: Skin is warm and dry  No rash noted  He is not diaphoretic  No erythema  No pallor  Psychiatric: He has a normal mood and affect  His behavior is normal  Judgment and thought content normal    Vitals reviewed

## 2019-07-31 ENCOUNTER — OFFICE VISIT (OUTPATIENT)
Dept: URGENT CARE | Facility: MEDICAL CENTER | Age: 69
End: 2019-07-31
Payer: MEDICARE

## 2019-07-31 VITALS
BODY MASS INDEX: 24.91 KG/M2 | OXYGEN SATURATION: 98 % | DIASTOLIC BLOOD PRESSURE: 60 MMHG | HEIGHT: 65 IN | WEIGHT: 149.5 LBS | HEART RATE: 69 BPM | RESPIRATION RATE: 16 BRPM | SYSTOLIC BLOOD PRESSURE: 103 MMHG | TEMPERATURE: 98.5 F

## 2019-07-31 DIAGNOSIS — J06.9 ACUTE URI: Primary | ICD-10-CM

## 2019-07-31 PROCEDURE — 99213 OFFICE O/P EST LOW 20 MIN: CPT | Performed by: PHYSICIAN ASSISTANT

## 2019-07-31 PROCEDURE — G0463 HOSPITAL OUTPT CLINIC VISIT: HCPCS | Performed by: PHYSICIAN ASSISTANT

## 2019-07-31 NOTE — PATIENT INSTRUCTIONS
Continue with saline, add Flonase  Continue with cough medication  Cold Symptoms   WHAT YOU NEED TO KNOW:   A cold is an infection caused by a virus  The infection causes your upper respiratory system to become inflamed  Common symptoms of a cold include sneezing, dry throat, a stuffy nose, headache, watery eyes, and a cough  Your cough may be dry, or you may cough up mucus  You may also have muscle aches, joint pain, and tiredness  Rarely, you may have a fever  Most colds go away without treatment  DISCHARGE INSTRUCTIONS:   Return to the emergency department if:   · You have increased tiredness and weakness  · You are unable to eat  · Your heart is beating much faster than usual for you  · You see white spots in the back of your throat and your neck is swollen and sore to the touch  · You see pinpoint or larger reddish-purple dots on your skin  Contact your healthcare provider if:   · You have a fever higher than 102°F (38 9°C)  · You have new or worsening shortness of breath  · You have thick nasal drainage for more than 2 days  · Your symptoms do not improve or get worse within 5 days  · You have questions or concerns about your condition or care  Medicines: The following medicines may be suggested by your healthcare provider to decrease your cold symptoms  These medicines are available without a doctor's order  Ask which medicines to take and when to take them  Follow directions  · NSAIDs or acetaminophen  help to bring down a fever or decrease pain  · Decongestants  help decrease nasal stuffiness  · Antihistamines  help decrease sneezing and a runny nose  · Cough suppressants  help decrease how much you cough  · Expectorants  help loosen mucus so you can cough it up  · Take your medicine as directed  Contact your healthcare provider if you think your medicine is not helping or if you have side effects  Tell him of her if you are allergic to any medicine  Keep a list of the medicines, vitamins, and herbs you take  Include the amounts, and when and why you take them  Bring the list or the pill bottles to follow-up visits  Carry your medicine list with you in case of an emergency  Symptom relief: The following may help relieve cold symptoms, such as a dry throat and congestion:  · Gargle with mouthwash or warm salt water as directed  · Suck on throat lozenges or hard candy  · Use a cold or warm vaporizer or humidifier to ease your breathing  · Rest for at least 2 days and then as needed to decrease tiredness and weakness  · Use petroleum based jelly around your nostrils to decrease irritation from blowing your nose  Drink liquids:  Liquids will help thin and loosen thick mucus so you can cough it up  Liquids will also keep you hydrated  Ask your healthcare provider which liquids are best for you and how much to drink each day  Prevent the spread of germs: You can spread your cold germs to others for at least 3 days after your symptoms start  Wash your hands often  Do not share items, such as eating utensils  Cover your nose and mouth when you cough or sneeze using the crook of your elbow instead of your hands  Throw used tissues in the garbage  Do not smoke:  Smoking may worsen your symptoms and increase the length of time you feel sick  Talk with your healthcare provider if you need help to stop smoking  Follow up with your healthcare provider as directed:  Write down your questions so you remember to ask them during your visits  © 2017 [facility] Information is for End User's use only and may not be sold, redistributed or otherwise used for commercial purposes  All illustrations and images included in CareNotes® are the copyrighted property of [employer]  or Marty Torrez  The above information is an  only  It is not intended as medical advice for individual conditions or treatments   Talk to your doctor, nurse or pharmacist before following any medical regimen to see if it is safe and effective for you

## 2019-07-31 NOTE — PROGRESS NOTES
[facility]        NAME: Floresita Crawford is a 68 y o  male  : 1950    MRN: [id number]  DATE: 2019  TIME: 2:08 PM    Assessment and Plan   Acute URI [J06 9]  1  Acute URI           Patient Instructions     Follow up with PCP in 3-5 days  Proceed to  ER if symptoms worsen  Chief Complaint     Chief Complaint   Patient presents with   Tony Chough Like Symptoms     Patient relates started with cold symptoms since Friday  Cough, congestion, post nasal drip, sneezing and runny nose  Unsure of fever  History of Present Illness       77-year-old male presents for cold symptoms  He states his symptoms began on Friday  He is complaining of nasal congestion, cough, body aches  He denies fever, chills, sweats, earache, sinus pain, sore throat, shortness of breath or wheezing  Denies history of smoking or lung disease  He is currently using saline nasal spray and over-the-counter cough medication  Review of Systems   Review of Systems   Constitutional: Negative  HENT: Positive for congestion, postnasal drip and rhinorrhea  Negative for ear discharge, ear pain, facial swelling, mouth sores, nosebleeds, sinus pressure, sinus pain, sneezing, sore throat and trouble swallowing  Eyes: Negative  Respiratory: Positive for cough  Negative for chest tightness, shortness of breath and wheezing  Gastrointestinal: Negative  Skin: Negative  Allergic/Immunologic: Negative            Current Medications       Current Outpatient Medications:     aspirin 81 mg chewable tablet, Chew 81 mg daily, Disp: , Rfl:     Coenzyme Q10 (COQ-10) 100 MG CAPS, Take by mouth, Disp: , Rfl:     diphenhydrAMINE (BENADRYL) 25 mg tablet, Take by mouth, Disp: , Rfl:     ezetimibe (ZETIA) 10 mg tablet, 10 mg every morning, Disp: , Rfl: 2    famotidine (PEPCID) 20 mg tablet, Take 1 tablet (20 mg total) by mouth daily, Disp: 90 tablet, Rfl: 3    naproxen (NAPROSYN) 500 mg tablet, TAKE 1 AND ONE HALF TABLETS AT ONSET OF HEADACHES, Disp: 30 tablet, Rfl: 0    niacin (NIASPAN) 500 mg CR tablet, TAKE 1 TABLET BY MOUTH TWICE A DAY, Disp: 180 tablet, Rfl: 3    Omega-3 Fatty Acids (FISH OIL) 1,000 mg, Take by mouth daily, Disp: , Rfl:     rosuvastatin (CRESTOR) 10 MG tablet, Take 1 tablet (10 mg total) by mouth 4 (four) times a week Take 1 tablet 4 times a week, Disp: 60 tablet, Rfl: 3    niacin 500 mg tablet, Take 1 tablet (500 mg total) by mouth 2 (two) times a day, Disp: 180 tablet, Rfl: 3    Current Allergies     Allergies as of 07/31/2019    (No Known Allergies)            The following portions of the patient's history were reviewed and updated as appropriate: allergies, current medications, past family history, past medical history, past social history, past surgical history and problem list     Objective   /60   Pulse 69   Temp 98 5 °F (36 9 °C) (Tympanic)   Resp 16   Ht 5' 5" (1 651 m)   Wt 67 8 kg (149 lb 8 oz)   SpO2 98%   BMI 24 88 kg/m²        Physical Exam     Physical Exam   Constitutional: Vital signs are normal  He appears well-developed and well-nourished  No distress  HENT:   Head: Normocephalic and atraumatic  Right Ear: Hearing, tympanic membrane, external ear and ear canal normal    Left Ear: Hearing, tympanic membrane, external ear and ear canal normal    Nose: Nose normal  No mucosal edema or rhinorrhea  Right sinus exhibits no maxillary sinus tenderness and no frontal sinus tenderness  Left sinus exhibits no maxillary sinus tenderness and no frontal sinus tenderness  Mouth/Throat: Uvula is midline, oropharynx is clear and moist and mucous membranes are normal  No oropharyngeal exudate, posterior oropharyngeal edema, posterior oropharyngeal erythema or tonsillar abscesses  Eyes: Conjunctivae and lids are normal    Cardiovascular: Normal rate, regular rhythm and normal heart sounds  No murmur heard    Pulmonary/Chest: Effort normal and breath sounds normal  No respiratory distress  He has no decreased breath sounds  He has no wheezes  He has no rhonchi  He has no rales  Lymphadenopathy:        Head (right side): No submandibular and no tonsillar adenopathy present  Head (left side): No submandibular and no tonsillar adenopathy present  Skin: No rash noted  Nursing note and vitals reviewed

## 2019-08-20 DIAGNOSIS — E78.2 MIXED HYPERLIPIDEMIA: Primary | ICD-10-CM

## 2019-08-20 RX ORDER — EZETIMIBE 10 MG/1
TABLET ORAL
Qty: 90 TABLET | Refills: 2 | Status: SHIPPED | OUTPATIENT
Start: 2019-08-20

## 2019-08-26 ENCOUNTER — OFFICE VISIT (OUTPATIENT)
Dept: CARDIOLOGY CLINIC | Facility: CLINIC | Age: 69
End: 2019-08-26
Payer: MEDICARE

## 2019-08-26 VITALS
HEIGHT: 65 IN | DIASTOLIC BLOOD PRESSURE: 70 MMHG | WEIGHT: 148.6 LBS | BODY MASS INDEX: 24.76 KG/M2 | HEART RATE: 70 BPM | SYSTOLIC BLOOD PRESSURE: 110 MMHG

## 2019-08-26 DIAGNOSIS — E78.2 MIXED HYPERLIPIDEMIA: Chronic | ICD-10-CM

## 2019-08-26 DIAGNOSIS — I25.10 ATHEROSCLEROSIS OF NATIVE CORONARY ARTERY OF NATIVE HEART WITHOUT ANGINA PECTORIS: Primary | Chronic | ICD-10-CM

## 2019-08-26 DIAGNOSIS — J06.9 UPPER RESPIRATORY TRACT INFECTION, UNSPECIFIED TYPE: ICD-10-CM

## 2019-08-26 DIAGNOSIS — I10 ESSENTIAL HYPERTENSION: Chronic | ICD-10-CM

## 2019-08-26 PROCEDURE — 99213 OFFICE O/P EST LOW 20 MIN: CPT | Performed by: INTERNAL MEDICINE

## 2019-08-26 RX ORDER — AZITHROMYCIN 250 MG/1
250 TABLET, FILM COATED ORAL EVERY 24 HOURS
Qty: 7 TABLET | Refills: 0 | Status: SHIPPED | OUTPATIENT
Start: 2019-08-26 | End: 2019-09-02

## 2019-08-26 NOTE — LETTER
August 26, 2019     Aleksander Huber MD  [address]  [address]  [address] [place] [address]    Patient: Randall Tran   YOB: 1950   Date of Visit: 8/26/2019       Dear Dr Karol Echeverria: Thank you for referring Joshua Ayala to me for evaluation  Below are my notes for this consultation  If you have questions, please do not hesitate to call me  I look forward to following your patient along with you  Sincerely,        Bryon Li MD        CC: No Recipients  Bryon Li MD  8/26/2019  9:17 AM  Sign at close encounter  Assessment/Plan:    Atherosclerosis of native coronary artery  Coronary artery disease, stable with no symptoms of angina or signs of heart failure  Hypertension  Hypertension, stable and adequately controlled  Hyperlipidemia  Hyperlipidemia stable  The patient will continue Crestor and Zetia  Diagnoses and all orders for this visit:    Atherosclerosis of native coronary artery of native heart without angina pectoris    Essential hypertension    Mixed hyperlipidemia    Upper respiratory tract infection, unspecified type  -     azithromycin (ZITHROMAX) 250 mg tablet; Take 1 tablet (250 mg total) by mouth every 24 hours for 7 days          Subjective:  Upper respiratory symptoms  Patient ID: Randall Tran is a 68 y o  male  Patient presented to this office for the purpose of cardiac follow-up  He has known history of coronary artery disease with hypercholesterolemia  He has been feeling rather well denying any symptoms of chest pain, shortness of breath, palpitation, dizziness or lightheadedness  He has no leg edema  He is currently experiencing some viral cold symptoms        The following portions of the patient's history were reviewed and updated as appropriate: allergies, current medications, past family history, past medical history, past social history, past surgical history and problem list     Review of Systems   Respiratory: Negative for apnea, cough, chest tightness, shortness of breath and wheezing  Cardiovascular: Negative for chest pain, palpitations and leg swelling  Gastrointestinal: Negative for abdominal pain  Neurological: Negative for dizziness and light-headedness  Objective:  Stable cardiac-wise  /70 (BP Location: Right arm, Patient Position: Sitting)   Pulse 70   Ht 5' 5" (1 651 m)   Wt 67 4 kg (148 lb 9 6 oz)   BMI 24 73 kg/m²           Physical Exam   Constitutional: He is oriented to person, place, and time  He appears well-developed and well-nourished  No distress  HENT:   Head: Normocephalic  Eyes: Pupils are equal, round, and reactive to light  Neck: Normal range of motion  No JVD present  No thyromegaly present  Cardiovascular: Normal rate, regular rhythm, S1 normal and S2 normal  Exam reveals no gallop and no friction rub  No murmur heard  Pulmonary/Chest: Effort normal and breath sounds normal  No respiratory distress  He has no wheezes  He has no rales  He exhibits no tenderness  Abdominal: Soft  Musculoskeletal: Normal range of motion  He exhibits no edema, tenderness or deformity  Neurological: He is alert and oriented to person, place, and time  Skin: Skin is warm and dry  He is not diaphoretic  Psychiatric: He has a normal mood and affect  Vitals reviewed

## 2019-08-26 NOTE — LETTER
August 26, 2019     Eric Schneider MD  [address]  [address]  [address] [place] [address]    Patient: Christoph Rollins   YOB: 1950   Date of Visit: 8/26/2019       Dear Dr Francis Schools: Thank you for referring Sharmin Veras to me for evaluation  Below are my notes for this consultation  If you have questions, please do not hesitate to call me  I look forward to following your patient along with you  Sincerely,        Jeane Rosas MD        CC: No Recipients  Jeane Rosas MD  8/26/2019  9:17 AM  Incomplete  Assessment/Plan:    Atherosclerosis of native coronary artery  Coronary artery disease, stable with no symptoms of angina or signs of heart failure  Hypertension  Hypertension, stable and adequately controlled  Hyperlipidemia  Hyperlipidemia stable  The patient will continue Crestor and Zetia  Diagnoses and all orders for this visit:    Atherosclerosis of native coronary artery of native heart without angina pectoris    Essential hypertension    Mixed hyperlipidemia    Upper respiratory tract infection, unspecified type  -     azithromycin (ZITHROMAX) 250 mg tablet; Take 1 tablet (250 mg total) by mouth every 24 hours for 7 days          Subjective:  Upper respiratory symptoms  Patient ID: Christoph Rollins is a 68 y o  male  Patient presented to this office for the purpose of cardiac follow-up  He has known history of coronary artery disease with hypercholesterolemia  He has been feeling rather well denying any symptoms of chest pain, shortness of breath, palpitation, dizziness or lightheadedness  He has no leg edema  He is currently experiencing some viral cold symptoms        The following portions of the patient's history were reviewed and updated as appropriate: allergies, current medications, past family history, past medical history, past social history, past surgical history and problem list     Review of Systems   Respiratory: Negative for apnea, cough, chest tightness, shortness of breath and wheezing  Cardiovascular: Negative for chest pain, palpitations and leg swelling  Gastrointestinal: Negative for abdominal pain  Neurological: Negative for dizziness and light-headedness  Objective:  Stable cardiac-wise  /70 (BP Location: Right arm, Patient Position: Sitting)   Pulse 70   Ht 5' 5" (1 651 m)   Wt 67 4 kg (148 lb 9 6 oz)   BMI 24 73 kg/m²           Physical Exam   Constitutional: He is oriented to person, place, and time  He appears well-developed and well-nourished  No distress  HENT:   Head: Normocephalic  Eyes: Pupils are equal, round, and reactive to light  Neck: Normal range of motion  No JVD present  No thyromegaly present  Cardiovascular: Normal rate, regular rhythm, S1 normal and S2 normal  Exam reveals no gallop and no friction rub  No murmur heard  Pulmonary/Chest: Effort normal and breath sounds normal  No respiratory distress  He has no wheezes  He has no rales  He exhibits no tenderness  Abdominal: Soft  Musculoskeletal: Normal range of motion  He exhibits no edema, tenderness or deformity  Neurological: He is alert and oriented to person, place, and time  Skin: Skin is warm and dry  He is not diaphoretic  Psychiatric: He has a normal mood and affect  Vitals reviewed

## 2019-08-26 NOTE — PROGRESS NOTES
Assessment/Plan:    Atherosclerosis of native coronary artery  Coronary artery disease, stable with no symptoms of angina or signs of heart failure  Hypertension  Hypertension, stable and adequately controlled  Hyperlipidemia  Hyperlipidemia stable  The patient will continue Crestor and Zetia  Diagnoses and all orders for this visit:    Atherosclerosis of native coronary artery of native heart without angina pectoris    Essential hypertension    Mixed hyperlipidemia    Upper respiratory tract infection, unspecified type  -     azithromycin (ZITHROMAX) 250 mg tablet; Take 1 tablet (250 mg total) by mouth every 24 hours for 7 days          Subjective:  Upper respiratory symptoms  Patient ID: Christoph Rollins is a 68 y o  male  Patient presented to this office for the purpose of cardiac follow-up  He has known history of coronary artery disease with hypercholesterolemia  He has been feeling rather well denying any symptoms of chest pain, shortness of breath, palpitation, dizziness or lightheadedness  He has no leg edema  He is currently experiencing some viral cold symptoms  The following portions of the patient's history were reviewed and updated as appropriate: allergies, current medications, past family history, past medical history, past social history, past surgical history and problem list     Review of Systems   Respiratory: Negative for apnea, cough, chest tightness, shortness of breath and wheezing  Cardiovascular: Negative for chest pain, palpitations and leg swelling  Gastrointestinal: Negative for abdominal pain  Neurological: Negative for dizziness and light-headedness  Objective:  Stable cardiac-wise  /70 (BP Location: Right arm, Patient Position: Sitting)   Pulse 70   Ht 5' 5" (1 651 m)   Wt 67 4 kg (148 lb 9 6 oz)   BMI 24 73 kg/m²          Physical Exam   Constitutional: He is oriented to person, place, and time   He appears well-developed and well-nourished  No distress  HENT:   Head: Normocephalic  Eyes: Pupils are equal, round, and reactive to light  Neck: Normal range of motion  No JVD present  No thyromegaly present  Cardiovascular: Normal rate, regular rhythm, S1 normal and S2 normal  Exam reveals no gallop and no friction rub  No murmur heard  Pulmonary/Chest: Effort normal and breath sounds normal  No respiratory distress  He has no wheezes  He has no rales  He exhibits no tenderness  Abdominal: Soft  Musculoskeletal: Normal range of motion  He exhibits no edema, tenderness or deformity  Neurological: He is alert and oriented to person, place, and time  Skin: Skin is warm and dry  He is not diaphoretic  Psychiatric: He has a normal mood and affect  Vitals reviewed

## 2019-08-26 NOTE — PATIENT INSTRUCTIONS
The patient is advised to continue on the same medications  I prescribed Z-Romero 250 mg daily for 7 days

## 2019-09-18 ENCOUNTER — APPOINTMENT (OUTPATIENT)
Dept: LAB | Facility: MEDICAL CENTER | Age: 69
End: 2019-09-18
Payer: MEDICARE

## 2019-09-18 DIAGNOSIS — E78.2 MIXED HYPERLIPIDEMIA: Chronic | ICD-10-CM

## 2019-09-18 DIAGNOSIS — R73.9 HYPERGLYCEMIA: Chronic | ICD-10-CM

## 2019-09-18 DIAGNOSIS — I10 ESSENTIAL HYPERTENSION: Chronic | ICD-10-CM

## 2019-09-18 DIAGNOSIS — I25.10 CORONARY ARTERY DISEASE INVOLVING NATIVE CORONARY ARTERY OF NATIVE HEART WITHOUT ANGINA PECTORIS: Chronic | ICD-10-CM

## 2019-09-18 LAB
ALBUMIN SERPL BCP-MCNC: 4.2 G/DL (ref 3.5–5)
ALP SERPL-CCNC: 114 U/L (ref 46–116)
ALT SERPL W P-5'-P-CCNC: 50 U/L (ref 12–78)
ANION GAP SERPL CALCULATED.3IONS-SCNC: 8 MMOL/L (ref 4–13)
AST SERPL W P-5'-P-CCNC: 27 U/L (ref 5–45)
BILIRUB SERPL-MCNC: 0.71 MG/DL (ref 0.2–1)
BUN SERPL-MCNC: 20 MG/DL (ref 5–25)
CALCIUM SERPL-MCNC: 9.1 MG/DL (ref 8.3–10.1)
CHLORIDE SERPL-SCNC: 104 MMOL/L (ref 100–108)
CHOLEST SERPL-MCNC: 113 MG/DL (ref 50–200)
CO2 SERPL-SCNC: 27 MMOL/L (ref 21–32)
CREAT SERPL-MCNC: 0.91 MG/DL (ref 0.6–1.3)
EST. AVERAGE GLUCOSE BLD GHB EST-MCNC: 108 MG/DL
GFR SERPL CREATININE-BSD FRML MDRD: 84 ML/MIN/1.73SQ M
GLUCOSE P FAST SERPL-MCNC: 96 MG/DL (ref 65–99)
HBA1C MFR BLD: 5.4 % (ref 4.2–6.3)
HDLC SERPL-MCNC: 42 MG/DL (ref 40–60)
LDLC SERPL DIRECT ASSAY-MCNC: 60 MG/DL (ref 0–100)
POTASSIUM SERPL-SCNC: 4 MMOL/L (ref 3.5–5.3)
PROT SERPL-MCNC: 7.6 G/DL (ref 6.4–8.2)
SODIUM SERPL-SCNC: 139 MMOL/L (ref 136–145)
TRIGL SERPL-MCNC: 81 MG/DL

## 2019-09-18 PROCEDURE — 80061 LIPID PANEL: CPT

## 2019-09-18 PROCEDURE — 83721 ASSAY OF BLOOD LIPOPROTEIN: CPT

## 2019-09-18 PROCEDURE — 83036 HEMOGLOBIN GLYCOSYLATED A1C: CPT

## 2019-09-18 PROCEDURE — 36415 COLL VENOUS BLD VENIPUNCTURE: CPT

## 2019-09-18 PROCEDURE — 80053 COMPREHEN METABOLIC PANEL: CPT

## 2019-09-23 ENCOUNTER — OFFICE VISIT (OUTPATIENT)
Dept: INTERNAL MEDICINE CLINIC | Facility: CLINIC | Age: 69
End: 2019-09-23
Payer: MEDICARE

## 2019-09-23 VITALS
BODY MASS INDEX: 24.76 KG/M2 | DIASTOLIC BLOOD PRESSURE: 78 MMHG | HEART RATE: 72 BPM | RESPIRATION RATE: 14 BRPM | HEIGHT: 65 IN | SYSTOLIC BLOOD PRESSURE: 128 MMHG | WEIGHT: 148.6 LBS

## 2019-09-23 DIAGNOSIS — Z23 ENCOUNTER FOR IMMUNIZATION: Primary | ICD-10-CM

## 2019-09-23 DIAGNOSIS — K80.20 CALCULUS OF GALLBLADDER WITHOUT CHOLECYSTITIS WITHOUT OBSTRUCTION: Chronic | ICD-10-CM

## 2019-09-23 DIAGNOSIS — I10 ESSENTIAL HYPERTENSION: Chronic | ICD-10-CM

## 2019-09-23 DIAGNOSIS — E78.2 MIXED HYPERLIPIDEMIA: Chronic | ICD-10-CM

## 2019-09-23 PROCEDURE — 90662 IIV NO PRSV INCREASED AG IM: CPT

## 2019-09-23 PROCEDURE — G0008 ADMIN INFLUENZA VIRUS VAC: HCPCS

## 2019-09-23 PROCEDURE — 99214 OFFICE O/P EST MOD 30 MIN: CPT | Performed by: INTERNAL MEDICINE

## 2019-09-23 NOTE — PROGRESS NOTES
Assessment/Plan:   1  Health maintenance - given influenza vaccine today  2  Health maintenance -has a prescription for Shingrix vaccine  3  Diarrhea -resolved presented after ingestion of raw hamburger meat -all testing including Clostridium difficile, culture, O and P in Giardia normal   Symptoms resolve with occasional symptoms suspect some component of irritable bowel post infection -she was counseled on this  4  Elevated liver enzymes noted previously  Resolved with decrease in alcohol  Hepatitis B and hepatitis c  Testing negative  CT scan shows unremarkable liver, gallstone abdominal wall hernia  May have also been influenced by his statin  Improved on lower dose of Crestor  5  Abdominal wall hernia CT scan- no treatment needed -she also has diastasis recti clinically-monitor  6  Small umbilical hernia -monitor  7  Cholelithiasis -1 stone-asymptomatic-monitor  8  Amount of sperm E-felt related underlying BPH -monitored by Urology  9  Coronary artery disease -had a CABG in year 2006  Myoview in September 2017 normal-recently saw Cardiology in felt to be stable  10  Hyperlipidemia -treating aggressively with known CAD  Dose of Crestor had been decreased because of elevated liver enzymes  Much improved with addition of Zetia  He will discontinue niacin  11  Hyperglycemia-A1c prior to this visit normal   12  Recent URTI -had symptoms then improved-was treated with azithromycin by his cardiologist overall month ago  Now with minimal symptoms -at this point monitoring -he will call this is more of an issue    All other problems as per note December 2016      MEDICAL REGIMEN:      Crestor 10 milligrams-4 days per week, coenzyme Q10, baby aspirin daily, Pepcid 20 milligrams daily, topical metronidazole 0 7 high% b i d  P r n   The face, infrequent use of Fioricet, prior use and Naprosyn 500 milligrams -1 at tablets at onset of migraine, generic Zetia 10 milligrams daily, fish oil 1 dose daily, intermittent use of Benadryl 25 milligrams HS    Patient is aware of the change in medical practice here  He will either be rescheduled with 1 of the physicians in this group verses in [facility] -he will let me know  No problem-specific Assessment & Plan notes found for this encounter  Diagnoses and all orders for this visit:    Encounter for immunization  -     influenza vaccine, [id number], high-dose, PF 0 5 mL (FLUZONE HIGH-DOSE)    Essential hypertension    Mixed hyperlipidemia    Calculus of gallbladder without cholecystitis without obstruction          Subjective:      Patient ID: Jas Haddad is a 68 y o  male  He is overall relatively stable  He had URTI like symptoms in July want to ready center was told it was viral -these improved but then he had recurrent symptoms with some sore throat some cough -cardiology gave him azithromycin in the felt significantly better until over the last 2 days when he has some minimal sore throat  He denies fever  He denies night sweats  At last visit we added Zetia now has significant improvement is lipids  Labs show an A1c of 5 4 triglycerides 81 LDL 60 HDL 42 cholesterol 113 chemistry profile normal with a creatinine of 0 91  A1c is 5 4  She remains on a statin  Status associated myalgias and arthralgias from degenerative arthritis  A full skin examination was done today with the special dermatologic light  There was evidence of several benign keratoses  There were no worrisome lesions  I did not see anything to suggest basal cell or squamous cell carcinoma  There was no evidence of malignant melanoma  I reviewed the concept of benign hemangiomas of the skin  He has benign keratoses  He sees Urology yearly  He has occasional diarrhea  Was felt previously a may have an irritable bowel type situation post infectious colitis    He talked about rare left upper quadrant fullness in certain positions 1 exercise and which may be from his abdominal wall  He has not had any symptoms related to his known cholelithiasis  He has not had any symptoms related to his known coronary artery disease  Vaccine status reviewed  He was given influenza vaccine today  He has a prescription for new zoster vaccine  He has had both types of  Pneumococcal vaccine  This patient denies any systemic symptoms  Specifically there has been no evidence of fever, night sweats, significant weight loss or significant decrease in appetite  The following portions of the patient's history were reviewed and updated as appropriate: allergies, current medications, past family history, past medical history, past social history, past surgical history and problem list     Review of Systems   Constitutional: Negative  HENT: Positive for sore throat  Respiratory: Negative  Cardiovascular: Negative  Gastrointestinal: Negative  Endocrine: Negative  Genitourinary: Negative  Musculoskeletal: Negative  Neurological: Negative  Hematological: Negative  Psychiatric/Behavioral: Negative  Objective:      Ht 5' 5" (1 651 m)   Wt 67 4 kg (148 lb 9 6 oz)   BMI 24 73 kg/m²          Physical Exam   Constitutional: He is oriented to person, place, and time  He appears well-developed and well-nourished  No distress  HENT:   Head: Normocephalic and atraumatic  Right Ear: External ear normal    Left Ear: External ear normal    Nose: Nose normal    Mouth/Throat: Oropharynx is clear and moist  No oropharyngeal exudate  Eyes: Pupils are equal, round, and reactive to light  Conjunctivae and EOM are normal  Right eye exhibits no discharge  Left eye exhibits no discharge  No scleral icterus  Neck: No JVD present  No tracheal deviation present  No thyromegaly present  Cardiovascular: Normal rate, regular rhythm, normal heart sounds and intact distal pulses  Exam reveals no gallop and no friction rub  No murmur heard    Pulmonary/Chest: Effort normal and breath sounds normal  No stridor  No respiratory distress  He has no wheezes  He exhibits no tenderness  Scar from prior surgery   Abdominal: Bowel sounds are normal  He exhibits no distension and no mass  There is no tenderness  There is no rebound and no guarding  Diastasis recti   Genitourinary: Rectal exam shows guaiac negative stool  Musculoskeletal: Normal range of motion  He exhibits no edema, tenderness or deformity  Lymphadenopathy:     He has no cervical adenopathy  Neurological: He is alert and oriented to person, place, and time  He has normal reflexes  He displays normal reflexes  No cranial nerve deficit  He exhibits normal muscle tone  Coordination normal    Skin: Skin is warm and dry  No rash noted  He is not diaphoretic  No erythema  No pallor  Psychiatric: He has a normal mood and affect   His behavior is normal  Judgment and thought content normal

## 2019-10-28 ENCOUNTER — TELEPHONE (OUTPATIENT)
Dept: CARDIOLOGY CLINIC | Facility: CLINIC | Age: 69
End: 2019-10-28

## 2019-10-28 NOTE — TELEPHONE ENCOUNTER
Pt has been on an every other year stress test schedule and he forgot to ask you if you wanted it this year

## 2019-11-21 ENCOUNTER — TRANSCRIBE ORDERS (OUTPATIENT)
Dept: ADMINISTRATIVE | Facility: HOSPITAL | Age: 69
End: 2019-11-21

## 2019-11-21 ENCOUNTER — APPOINTMENT (OUTPATIENT)
Dept: LAB | Facility: MEDICAL CENTER | Age: 69
End: 2019-11-21
Payer: MEDICARE

## 2019-11-21 DIAGNOSIS — N40.1 ENLARGED PROSTATE WITH URINARY OBSTRUCTION: ICD-10-CM

## 2019-11-21 DIAGNOSIS — N40.1 ENLARGED PROSTATE WITH URINARY OBSTRUCTION: Primary | ICD-10-CM

## 2019-11-21 DIAGNOSIS — N13.8 ENLARGED PROSTATE WITH URINARY OBSTRUCTION: Primary | ICD-10-CM

## 2019-11-21 DIAGNOSIS — N13.8 ENLARGED PROSTATE WITH URINARY OBSTRUCTION: ICD-10-CM

## 2019-11-21 LAB
BUN SERPL-MCNC: 16 MG/DL (ref 5–25)
CREAT SERPL-MCNC: 0.98 MG/DL (ref 0.6–1.3)
GFR SERPL CREATININE-BSD FRML MDRD: 77 ML/MIN/1.73SQ M
PSA SERPL-MCNC: 1.6 NG/ML (ref 0–4)

## 2019-11-21 PROCEDURE — 84153 ASSAY OF PSA TOTAL: CPT

## 2019-11-21 PROCEDURE — 36415 COLL VENOUS BLD VENIPUNCTURE: CPT

## 2019-11-21 PROCEDURE — 82565 ASSAY OF CREATININE: CPT

## 2019-11-21 PROCEDURE — 84520 ASSAY OF UREA NITROGEN: CPT

## 2020-01-27 DIAGNOSIS — E78.2 MIXED HYPERLIPIDEMIA: ICD-10-CM

## 2020-01-27 RX ORDER — ROSUVASTATIN CALCIUM 10 MG/1
TABLET, COATED ORAL
Qty: 48 TABLET | Refills: 0 | Status: SHIPPED | OUTPATIENT
Start: 2020-01-27

## 2020-02-18 ENCOUNTER — OFFICE VISIT (OUTPATIENT)
Dept: CARDIOLOGY CLINIC | Facility: CLINIC | Age: 70
End: 2020-02-18
Payer: MEDICARE

## 2020-02-18 VITALS
RESPIRATION RATE: 18 BRPM | SYSTOLIC BLOOD PRESSURE: 115 MMHG | HEIGHT: 65 IN | BODY MASS INDEX: 25.59 KG/M2 | WEIGHT: 153.6 LBS | OXYGEN SATURATION: 98 % | DIASTOLIC BLOOD PRESSURE: 70 MMHG | HEART RATE: 70 BPM

## 2020-02-18 DIAGNOSIS — I10 ESSENTIAL HYPERTENSION: Chronic | ICD-10-CM

## 2020-02-18 DIAGNOSIS — I25.10 ATHEROSCLEROSIS OF NATIVE CORONARY ARTERY OF NATIVE HEART WITHOUT ANGINA PECTORIS: Primary | Chronic | ICD-10-CM

## 2020-02-18 DIAGNOSIS — E78.2 MIXED HYPERLIPIDEMIA: Chronic | ICD-10-CM

## 2020-02-18 PROCEDURE — 3078F DIAST BP <80 MM HG: CPT | Performed by: INTERNAL MEDICINE

## 2020-02-18 PROCEDURE — 3008F BODY MASS INDEX DOCD: CPT | Performed by: INTERNAL MEDICINE

## 2020-02-18 PROCEDURE — 1036F TOBACCO NON-USER: CPT | Performed by: INTERNAL MEDICINE

## 2020-02-18 PROCEDURE — 99213 OFFICE O/P EST LOW 20 MIN: CPT | Performed by: INTERNAL MEDICINE

## 2020-02-18 PROCEDURE — 3074F SYST BP LT 130 MM HG: CPT | Performed by: INTERNAL MEDICINE

## 2020-02-18 PROCEDURE — 4040F PNEUMOC VAC/ADMIN/RCVD: CPT | Performed by: INTERNAL MEDICINE

## 2020-02-18 PROCEDURE — 1160F RVW MEDS BY RX/DR IN RCRD: CPT | Performed by: INTERNAL MEDICINE

## 2020-02-18 NOTE — LETTER
February 18, 2020     Referral [facility]    Patient: Bryson Guillermo   YOB: 1950   Date of Visit: 2/18/2020       Dear Dr Rajesh Pratt:    Thank you for referring Tabitha Benton to me for evaluation  Below are my notes for this consultation  If you have questions, please do not hesitate to call me  I look forward to following your patient along with you  Sincerely,        Alix Snyder MD        CC: MD Alix Khan MD  2/18/2020  8:59 AM  Sign at close encounter  Assessment/Plan:    Hypertension  Hypertension, stable and adequately controlled    Atherosclerosis of native coronary artery  Coronary artery disease, stable  Status post coronary bypass surgery  We will continue present regimen    Hyperlipidemia  Hyperlipidemia, stable  The patient will continue rosuvastatin and Zetia  Diagnoses and all orders for this visit:    Atherosclerosis of native coronary artery of native heart without angina pectoris    Essential hypertension    Mixed hyperlipidemia          Subjective:  Feels well  Patient ID: Bryson Guillermo is a 68 y o  male  Patient presented to this office for the purpose of cardiac follow-up  He has a known history of coronary artery disease status post coronary bypass surgery  Has a history of hyperlipidemia and mild hypertension  He has been feeling well from the cardiac standpoint denying any symptoms of chest pain, shortness of breath, palpitation, dizziness or lightheadedness  He has no leg edema  The following portions of the patient's history were reviewed and updated as appropriate: allergies, current medications, past family history, past medical history, past social history, past surgical history and problem list     Review of Systems   Respiratory: Negative for apnea, cough, chest tightness, shortness of breath and wheezing  Cardiovascular: Negative for chest pain, palpitations and leg swelling  Gastrointestinal: Negative for abdominal pain  Neurological: Negative for dizziness and light-headedness  Hematological: Negative  Psychiatric/Behavioral: Negative  Objective:  Stable cardiac-wise  /70 (BP Location: Right arm, Patient Position: Sitting)   Pulse 70   Resp 18   Ht 5' 5" (1 651 m)   Wt 69 7 kg (153 lb 9 6 oz)   SpO2 98%   BMI 25 56 kg/m²           Physical Exam   Constitutional: He is oriented to person, place, and time  He appears well-developed and well-nourished  No distress  HENT:   Head: Normocephalic  Eyes: Pupils are equal, round, and reactive to light  Neck: Normal range of motion  No JVD present  No thyromegaly present  Cardiovascular: Normal rate, regular rhythm, S1 normal and S2 normal  Exam reveals no gallop and no friction rub  No murmur heard  Pulmonary/Chest: Effort normal and breath sounds normal  No respiratory distress  He has no wheezes  He has no rales  He exhibits no tenderness  Abdominal: Soft  Musculoskeletal: Normal range of motion  He exhibits no edema, tenderness or deformity  Neurological: He is alert and oriented to person, place, and time  Skin: Skin is warm and dry  He is not diaphoretic  Psychiatric: He has a normal mood and affect  Vitals reviewed

## 2020-02-18 NOTE — ASSESSMENT & PLAN NOTE
Coronary artery disease, stable  Status post coronary bypass surgery    We will continue present regimen

## 2020-02-18 NOTE — PROGRESS NOTES
Assessment/Plan:    Hypertension  Hypertension, stable and adequately controlled    Atherosclerosis of native coronary artery  Coronary artery disease, stable  Status post coronary bypass surgery  We will continue present regimen    Hyperlipidemia  Hyperlipidemia, stable  The patient will continue rosuvastatin and Zetia  Diagnoses and all orders for this visit:    Atherosclerosis of native coronary artery of native heart without angina pectoris    Essential hypertension    Mixed hyperlipidemia          Subjective:  Feels well  Patient ID: Kieran Yu is a 68 y o  male  Patient presented to this office for the purpose of cardiac follow-up  He has a known history of coronary artery disease status post coronary bypass surgery  Has a history of hyperlipidemia and mild hypertension  He has been feeling well from the cardiac standpoint denying any symptoms of chest pain, shortness of breath, palpitation, dizziness or lightheadedness  He has no leg edema  The following portions of the patient's history were reviewed and updated as appropriate: allergies, current medications, past family history, past medical history, past social history, past surgical history and problem list     Review of Systems   Respiratory: Negative for apnea, cough, chest tightness, shortness of breath and wheezing  Cardiovascular: Negative for chest pain, palpitations and leg swelling  Gastrointestinal: Negative for abdominal pain  Neurological: Negative for dizziness and light-headedness  Hematological: Negative  Psychiatric/Behavioral: Negative  Objective:  Stable cardiac-wise  /70 (BP Location: Right arm, Patient Position: Sitting)   Pulse 70   Resp 18   Ht 5' 5" (1 651 m)   Wt 69 7 kg (153 lb 9 6 oz)   SpO2 98%   BMI 25 56 kg/m²          Physical Exam   Constitutional: He is oriented to person, place, and time  He appears well-developed and well-nourished  No distress     HENT: Head: Normocephalic  Eyes: Pupils are equal, round, and reactive to light  Neck: Normal range of motion  No JVD present  No thyromegaly present  Cardiovascular: Normal rate, regular rhythm, S1 normal and S2 normal  Exam reveals no gallop and no friction rub  No murmur heard  Pulmonary/Chest: Effort normal and breath sounds normal  No respiratory distress  He has no wheezes  He has no rales  He exhibits no tenderness  Abdominal: Soft  Musculoskeletal: Normal range of motion  He exhibits no edema, tenderness or deformity  Neurological: He is alert and oriented to person, place, and time  Skin: Skin is warm and dry  He is not diaphoretic  Psychiatric: He has a normal mood and affect  Vitals reviewed

## 2020-08-17 ENCOUNTER — OFFICE VISIT (OUTPATIENT)
Dept: CARDIOLOGY CLINIC | Facility: CLINIC | Age: 70
End: 2020-08-17
Payer: MEDICARE

## 2020-08-17 VITALS
HEIGHT: 65 IN | SYSTOLIC BLOOD PRESSURE: 114 MMHG | WEIGHT: 156 LBS | DIASTOLIC BLOOD PRESSURE: 72 MMHG | HEART RATE: 72 BPM | BODY MASS INDEX: 25.99 KG/M2 | OXYGEN SATURATION: 99 %

## 2020-08-17 DIAGNOSIS — I10 ESSENTIAL HYPERTENSION: Chronic | ICD-10-CM

## 2020-08-17 DIAGNOSIS — E78.2 MIXED HYPERLIPIDEMIA: Chronic | ICD-10-CM

## 2020-08-17 DIAGNOSIS — I25.10 ATHEROSCLEROSIS OF NATIVE CORONARY ARTERY OF NATIVE HEART WITHOUT ANGINA PECTORIS: Primary | Chronic | ICD-10-CM

## 2020-08-17 PROCEDURE — 1036F TOBACCO NON-USER: CPT | Performed by: INTERNAL MEDICINE

## 2020-08-17 PROCEDURE — 3008F BODY MASS INDEX DOCD: CPT | Performed by: INTERNAL MEDICINE

## 2020-08-17 PROCEDURE — 1160F RVW MEDS BY RX/DR IN RCRD: CPT | Performed by: INTERNAL MEDICINE

## 2020-08-17 PROCEDURE — 3078F DIAST BP <80 MM HG: CPT | Performed by: INTERNAL MEDICINE

## 2020-08-17 PROCEDURE — 4040F PNEUMOC VAC/ADMIN/RCVD: CPT | Performed by: INTERNAL MEDICINE

## 2020-08-17 PROCEDURE — 99214 OFFICE O/P EST MOD 30 MIN: CPT | Performed by: INTERNAL MEDICINE

## 2020-08-17 PROCEDURE — 3074F SYST BP LT 130 MM HG: CPT | Performed by: INTERNAL MEDICINE

## 2020-08-17 NOTE — ASSESSMENT & PLAN NOTE
Coronary artery disease, stable with no symptoms of angina or signs of heart failure  We will continue present regimen

## 2020-08-17 NOTE — LETTER
August 17, 2020     Sapphire Dawson MD  [address]    Patient: Autumn Melo   YOB: 1950   Date of Visit: 8/17/2020       Dear Dr Cristobal Chi: Thank you for referring Shaylalizbeth Jenkinsre to me for evaluation  Below are my notes for this consultation  If you have questions, please do not hesitate to call me  I look forward to following your patient along with you  Sincerely,        Day Cardozo MD        CC: No Recipients  Day Cardozo MD  8/17/2020 10:18 AM  Sign when Signing Visit  Assessment/Plan:    Atherosclerosis of native coronary artery    Coronary artery disease, stable with no symptoms of angina or signs of heart failure  We will continue present regimen  Hypertension    History of hypertension, stable    Hyperlipidemia   Hyperlipidemia, stable  Patient will continue rosuvastatin 10 mg daily  Diagnoses and all orders for this visit:    Atherosclerosis of native coronary artery of native heart without angina pectoris    Essential hypertension    Mixed hyperlipidemia          Subjective:   Feels well  Patient ID: Autumn Melo is a 65 y o  male  The patient presented to this office for the purpose of cardiac follow-up  He has a known history of coronary artery disease hypertension hyperlipidemia  He has been feeling well from the cardiac standpoint denying any symptoms of chest pain, shortness of breath palpitation, dizziness or lightheadedness  He has no leg edema  The following portions of the patient's history were reviewed and updated as appropriate: allergies, current medications, past family history, past medical history, past social history, past surgical history and problem list     Review of Systems   Respiratory: Negative for apnea, cough, chest tightness, shortness of breath and wheezing  Cardiovascular: Negative for chest pain, palpitations and leg swelling  Gastrointestinal: Negative for abdominal pain  Neurological: Negative for dizziness and light-headedness  Objective:  Stable cardiac-wise  /72 (BP Location: Left arm, Patient Position: Sitting, Cuff Size: Adult)   Pulse 72   Ht 5' 5" (1 651 m)   Wt 70 8 kg (156 lb)   SpO2 99%   BMI 25 96 kg/m²          Physical Exam  Vitals signs reviewed  Constitutional:       General: He is not in acute distress  Appearance: He is well-developed  He is not diaphoretic  HENT:      Head: Normocephalic  Eyes:      Pupils: Pupils are equal, round, and reactive to light  Neck:      Musculoskeletal: Normal range of motion  Thyroid: No thyromegaly  Vascular: No JVD  Cardiovascular:      Rate and Rhythm: Normal rate and regular rhythm  Heart sounds: S1 normal and S2 normal  No murmur  No friction rub  No gallop  Pulmonary:      Effort: Pulmonary effort is normal  No respiratory distress  Breath sounds: Normal breath sounds  No wheezing or rales  Chest:      Chest wall: No tenderness  Abdominal:      Palpations: Abdomen is soft  Musculoskeletal: Normal range of motion  General: No tenderness or deformity  Skin:     General: Skin is warm and dry  Neurological:      Mental Status: He is alert and oriented to person, place, and time

## 2020-08-17 NOTE — PROGRESS NOTES
Assessment/Plan:    Atherosclerosis of native coronary artery    Coronary artery disease, stable with no symptoms of angina or signs of heart failure  We will continue present regimen  Hypertension    History of hypertension, stable    Hyperlipidemia   Hyperlipidemia, stable  Patient will continue rosuvastatin 10 mg daily  Diagnoses and all orders for this visit:    Atherosclerosis of native coronary artery of native heart without angina pectoris    Essential hypertension    Mixed hyperlipidemia          Subjective:   Feels well  Patient ID: Kathy Foley is a 65 y o  male  The patient presented to this office for the purpose of cardiac follow-up  He has a known history of coronary artery disease hypertension hyperlipidemia  He has been feeling well from the cardiac standpoint denying any symptoms of chest pain, shortness of breath palpitation, dizziness or lightheadedness  He has no leg edema  The following portions of the patient's history were reviewed and updated as appropriate: allergies, current medications, past family history, past medical history, past social history, past surgical history and problem list     Review of Systems   Respiratory: Negative for apnea, cough, chest tightness, shortness of breath and wheezing  Cardiovascular: Negative for chest pain, palpitations and leg swelling  Gastrointestinal: Negative for abdominal pain  Neurological: Negative for dizziness and light-headedness  Objective:  Stable cardiac-wise  /72 (BP Location: Left arm, Patient Position: Sitting, Cuff Size: Adult)   Pulse 72   Ht 5' 5" (1 651 m)   Wt 70 8 kg (156 lb)   SpO2 99%   BMI 25 96 kg/m²          Physical Exam  Vitals signs reviewed  Constitutional:       General: He is not in acute distress  Appearance: He is well-developed  He is not diaphoretic  HENT:      Head: Normocephalic     Eyes:      Pupils: Pupils are equal, round, and reactive to light    Neck:      Musculoskeletal: Normal range of motion  Thyroid: No thyromegaly  Vascular: No JVD  Cardiovascular:      Rate and Rhythm: Normal rate and regular rhythm  Heart sounds: S1 normal and S2 normal  No murmur  No friction rub  No gallop  Pulmonary:      Effort: Pulmonary effort is normal  No respiratory distress  Breath sounds: Normal breath sounds  No wheezing or rales  Chest:      Chest wall: No tenderness  Abdominal:      Palpations: Abdomen is soft  Musculoskeletal: Normal range of motion  General: No tenderness or deformity  Skin:     General: Skin is warm and dry  Neurological:      Mental Status: He is alert and oriented to person, place, and time

## 2020-10-09 ENCOUNTER — TELEPHONE (OUTPATIENT)
Dept: CARDIOLOGY CLINIC | Facility: CLINIC | Age: 70
End: 2020-10-09

## 2020-11-13 ENCOUNTER — TRANSCRIBE ORDERS (OUTPATIENT)
Dept: ADMINISTRATIVE | Facility: HOSPITAL | Age: 70
End: 2020-11-13

## 2020-11-13 ENCOUNTER — LAB (OUTPATIENT)
Dept: LAB | Facility: MEDICAL CENTER | Age: 70
End: 2020-11-13
Payer: MEDICARE

## 2020-11-13 DIAGNOSIS — N13.8 ENLARGED PROSTATE WITH URINARY OBSTRUCTION: ICD-10-CM

## 2020-11-13 DIAGNOSIS — N40.1 ENLARGED PROSTATE WITH URINARY OBSTRUCTION: ICD-10-CM

## 2020-11-13 DIAGNOSIS — N40.1 ENLARGED PROSTATE WITH URINARY OBSTRUCTION: Primary | ICD-10-CM

## 2020-11-13 DIAGNOSIS — N13.8 ENLARGED PROSTATE WITH URINARY OBSTRUCTION: Primary | ICD-10-CM

## 2020-11-13 LAB
ANION GAP SERPL CALCULATED.3IONS-SCNC: 4 MMOL/L (ref 4–13)
BUN SERPL-MCNC: 17 MG/DL (ref 5–25)
CALCIUM SERPL-MCNC: 9.4 MG/DL (ref 8.3–10.1)
CHLORIDE SERPL-SCNC: 100 MMOL/L (ref 100–108)
CO2 SERPL-SCNC: 31 MMOL/L (ref 21–32)
CREAT SERPL-MCNC: 0.98 MG/DL (ref 0.6–1.3)
GFR SERPL CREATININE-BSD FRML MDRD: 77 ML/MIN/1.73SQ M
GLUCOSE SERPL-MCNC: 97 MG/DL (ref 65–140)
POTASSIUM SERPL-SCNC: 4.5 MMOL/L (ref 3.5–5.3)
PSA SERPL-MCNC: 2.2 NG/ML (ref 0–4)
SODIUM SERPL-SCNC: 135 MMOL/L (ref 136–145)

## 2020-11-13 PROCEDURE — 36415 COLL VENOUS BLD VENIPUNCTURE: CPT

## 2020-11-13 PROCEDURE — G0103 PSA SCREENING: HCPCS

## 2020-11-13 PROCEDURE — 80048 BASIC METABOLIC PNL TOTAL CA: CPT

## 2021-02-16 ENCOUNTER — OFFICE VISIT (OUTPATIENT)
Dept: CARDIOLOGY CLINIC | Facility: CLINIC | Age: 71
End: 2021-02-16
Payer: MEDICARE

## 2021-02-16 VITALS
WEIGHT: 166 LBS | BODY MASS INDEX: 27.66 KG/M2 | DIASTOLIC BLOOD PRESSURE: 72 MMHG | HEIGHT: 65 IN | SYSTOLIC BLOOD PRESSURE: 122 MMHG | HEART RATE: 72 BPM

## 2021-02-16 DIAGNOSIS — I25.10 ATHEROSCLEROSIS OF NATIVE CORONARY ARTERY OF NATIVE HEART WITHOUT ANGINA PECTORIS: Chronic | ICD-10-CM

## 2021-02-16 DIAGNOSIS — I10 ESSENTIAL HYPERTENSION: Primary | Chronic | ICD-10-CM

## 2021-02-16 DIAGNOSIS — E78.2 MIXED HYPERLIPIDEMIA: Chronic | ICD-10-CM

## 2021-02-16 PROCEDURE — 99214 OFFICE O/P EST MOD 30 MIN: CPT | Performed by: INTERNAL MEDICINE

## 2021-02-16 NOTE — LETTER
February 16, 2021     Kamar Sheppard MD  [address]    Patient: Aaron Yousif   YOB: 1950   Date of Visit: 2/16/2021       Dear Dr Sharon Vincent: Thank you for referring Marlena Aldana to me for evaluation  Below are my notes for this consultation  If you have questions, please do not hesitate to call me  I look forward to following your patient along with you  Sincerely,        Florencio Hudson MD        CC: No Recipients  Florencio Hudson MD  2/16/2021  4:00 PM  Sign when Signing Visit  Assessment/Plan:    Hyperlipidemia    Hyperlipidemia, stable  The patient will continue rosuvastatin at 10 mg daily  He will also continue Zetia 10 mg daily  Hypertension    Hypertension, stable and adequately controlled  Atherosclerosis of native coronary artery    Coronary artery disease status post coronary bypass surgery  This has been stable  Patient is angina free  No signs of heart failure  We will continue present regimen  Diagnoses and all orders for this visit:    Essential hypertension    Atherosclerosis of native coronary artery of native heart without angina pectoris    Mixed hyperlipidemia          Subjective:   Feels well  Patient ID: Aaron Yousif is a 65 y o  male  The patient presented to this office for the purpose of cardiac follow-up  He is known to have history of coronary artery disease status post coronary bypass surgery  He has history of hyperlipidemia as well  He has been feeling rather well denying any symptoms of chest pain shortness of breath, palpitation, dizziness or lightheadedness  He has no leg edema        The following portions of the patient's history were reviewed and updated as appropriate: allergies, current medications, past family history, past medical history, past social history, past surgical history and problem list     Review of Systems   Respiratory: Negative for apnea, cough, chest tightness, shortness of breath and wheezing  Cardiovascular: Negative for chest pain, palpitations and leg swelling  Gastrointestinal: Negative for abdominal pain  Neurological: Negative for dizziness and light-headedness  Psychiatric/Behavioral: Negative  Objective:  Stable cardiac-wise  /72 (BP Location: Right arm, Patient Position: Sitting, Cuff Size: Adult)   Pulse 72   Ht 5' 5" (1 651 m)   Wt 75 3 kg (166 lb)   BMI 27 62 kg/m²          Physical Exam  Vitals signs reviewed  Constitutional:       General: He is not in acute distress  Appearance: He is well-developed  He is not diaphoretic  HENT:      Head: Normocephalic  Eyes:      Pupils: Pupils are equal, round, and reactive to light  Neck:      Musculoskeletal: Normal range of motion  Thyroid: No thyromegaly  Vascular: No JVD  Cardiovascular:      Rate and Rhythm: Normal rate and regular rhythm  Heart sounds: S1 normal and S2 normal  No murmur  No friction rub  No gallop  Pulmonary:      Effort: Pulmonary effort is normal  No respiratory distress  Breath sounds: Normal breath sounds  No wheezing or rales  Chest:      Chest wall: No tenderness  Abdominal:      Palpations: Abdomen is soft  Musculoskeletal: Normal range of motion  General: No tenderness or deformity  Right lower leg: No edema  Left lower leg: No edema  Skin:     General: Skin is warm and dry  Neurological:      Mental Status: He is alert and oriented to person, place, and time     Psychiatric:         Mood and Affect: Mood normal          Behavior: Behavior normal

## 2021-02-16 NOTE — PROGRESS NOTES
Assessment/Plan:    Hyperlipidemia    Hyperlipidemia, stable  The patient will continue rosuvastatin at 10 mg daily  He will also continue Zetia 10 mg daily  Hypertension    Hypertension, stable and adequately controlled  Atherosclerosis of native coronary artery    Coronary artery disease status post coronary bypass surgery  This has been stable  Patient is angina free  No signs of heart failure  We will continue present regimen  Diagnoses and all orders for this visit:    Essential hypertension    Atherosclerosis of native coronary artery of native heart without angina pectoris    Mixed hyperlipidemia          Subjective:   Feels well  Patient ID: Svetlana Leal is a 65 y o  male  The patient presented to this office for the purpose of cardiac follow-up  He is known to have history of coronary artery disease status post coronary bypass surgery  He has history of hyperlipidemia as well  He has been feeling rather well denying any symptoms of chest pain shortness of breath, palpitation, dizziness or lightheadedness  He has no leg edema  The following portions of the patient's history were reviewed and updated as appropriate: allergies, current medications, past family history, past medical history, past social history, past surgical history and problem list     Review of Systems   Respiratory: Negative for apnea, cough, chest tightness, shortness of breath and wheezing  Cardiovascular: Negative for chest pain, palpitations and leg swelling  Gastrointestinal: Negative for abdominal pain  Neurological: Negative for dizziness and light-headedness  Psychiatric/Behavioral: Negative  Objective:  Stable cardiac-wise  /72 (BP Location: Right arm, Patient Position: Sitting, Cuff Size: Adult)   Pulse 72   Ht 5' 5" (1 651 m)   Wt 75 3 kg (166 lb)   BMI 27 62 kg/m²          Physical Exam  Vitals signs reviewed     Constitutional:       General: He is not in acute distress  Appearance: He is well-developed  He is not diaphoretic  HENT:      Head: Normocephalic  Eyes:      Pupils: Pupils are equal, round, and reactive to light  Neck:      Musculoskeletal: Normal range of motion  Thyroid: No thyromegaly  Vascular: No JVD  Cardiovascular:      Rate and Rhythm: Normal rate and regular rhythm  Heart sounds: S1 normal and S2 normal  No murmur  No friction rub  No gallop  Pulmonary:      Effort: Pulmonary effort is normal  No respiratory distress  Breath sounds: Normal breath sounds  No wheezing or rales  Chest:      Chest wall: No tenderness  Abdominal:      Palpations: Abdomen is soft  Musculoskeletal: Normal range of motion  General: No tenderness or deformity  Right lower leg: No edema  Left lower leg: No edema  Skin:     General: Skin is warm and dry  Neurological:      Mental Status: He is alert and oriented to person, place, and time     Psychiatric:         Mood and Affect: Mood normal          Behavior: Behavior normal

## 2021-02-16 NOTE — ASSESSMENT & PLAN NOTE
Hyperlipidemia, stable  The patient will continue rosuvastatin at 10 mg daily  He will also continue Zetia 10 mg daily

## 2021-02-16 NOTE — ASSESSMENT & PLAN NOTE
Coronary artery disease status post coronary bypass surgery  This has been stable  Patient is angina free  No signs of heart failure  We will continue present regimen

## 2021-05-07 ENCOUNTER — APPOINTMENT (OUTPATIENT)
Dept: LAB | Facility: MEDICAL CENTER | Age: 71
End: 2021-05-07
Payer: MEDICARE

## 2021-05-07 ENCOUNTER — TRANSCRIBE ORDERS (OUTPATIENT)
Dept: ADMINISTRATIVE | Facility: HOSPITAL | Age: 71
End: 2021-05-07

## 2021-05-07 DIAGNOSIS — N13.8 ENLARGED PROSTATE WITH URINARY OBSTRUCTION: ICD-10-CM

## 2021-05-07 DIAGNOSIS — N13.8 ENLARGED PROSTATE WITH URINARY OBSTRUCTION: Primary | ICD-10-CM

## 2021-05-07 DIAGNOSIS — N40.1 ENLARGED PROSTATE WITH URINARY OBSTRUCTION: Primary | ICD-10-CM

## 2021-05-07 DIAGNOSIS — N40.1 ENLARGED PROSTATE WITH URINARY OBSTRUCTION: ICD-10-CM

## 2021-05-07 LAB — PSA SERPL-MCNC: 2.7 NG/ML (ref 0–4)

## 2021-05-07 PROCEDURE — 36415 COLL VENOUS BLD VENIPUNCTURE: CPT

## 2021-05-07 PROCEDURE — 84153 ASSAY OF PSA TOTAL: CPT

## 2021-08-09 ENCOUNTER — OFFICE VISIT (OUTPATIENT)
Dept: CARDIOLOGY CLINIC | Facility: CLINIC | Age: 71
End: 2021-08-09
Payer: MEDICARE

## 2021-08-09 VITALS
BODY MASS INDEX: 27.92 KG/M2 | HEIGHT: 65 IN | WEIGHT: 167.6 LBS | OXYGEN SATURATION: 97 % | HEART RATE: 67 BPM | SYSTOLIC BLOOD PRESSURE: 110 MMHG | DIASTOLIC BLOOD PRESSURE: 60 MMHG

## 2021-08-09 DIAGNOSIS — E78.2 MIXED HYPERLIPIDEMIA: Chronic | ICD-10-CM

## 2021-08-09 DIAGNOSIS — I10 ESSENTIAL HYPERTENSION: Chronic | ICD-10-CM

## 2021-08-09 DIAGNOSIS — I25.10 ATHEROSCLEROSIS OF NATIVE CORONARY ARTERY OF NATIVE HEART WITHOUT ANGINA PECTORIS: Primary | Chronic | ICD-10-CM

## 2021-08-09 DIAGNOSIS — R73.9 HYPERGLYCEMIA: Chronic | ICD-10-CM

## 2021-08-09 PROCEDURE — 99214 OFFICE O/P EST MOD 30 MIN: CPT | Performed by: INTERNAL MEDICINE

## 2021-08-09 RX ORDER — ACETAMINOPHEN 325 MG/1
650 TABLET ORAL EVERY 6 HOURS PRN
COMMUNITY

## 2021-08-09 RX ORDER — AZELASTINE 1 MG/ML
1-2 SPRAY, METERED NASAL 2 TIMES DAILY
COMMUNITY
Start: 2020-12-21 | End: 2022-02-21

## 2021-08-09 NOTE — LETTER
August 9, 2021     Princess Kendall MD  [address]    Patient: Pauline Nunn   YOB: 1950   Date of Visit: 8/9/2021       Dear Dr Stephanie Retana: Thank you for referring Alfred Pacheco to me for evaluation  Below are my notes for this consultation  If you have questions, please do not hesitate to call me  I look forward to following your patient along with you  Sincerely,        Trista Swain MD        CC: No Recipients  Trista Swain MD  8/9/2021  9:06 AM  Sign when Signing Visit  Assessment/Plan:    Atherosclerosis of native coronary artery    Coronary artery disease, stable  No symptoms of angina or signs of heart failure  We will continue present regimen  Hypertension    Hypertension, stable and adequately controlled  Hyperlipidemia    Hyperlipidemia, stable  The patient will continue Zetia and rosuvastatin  Diagnoses and all orders for this visit:    Atherosclerosis of native coronary artery of native heart without angina pectoris  -     NM myocardial perfusion spect (stress and/or rest); Future    Essential hypertension    Hyperglycemia    Mixed hyperlipidemia    Other orders  -     acetaminophen (TYLENOL) 325 mg tablet; Take 650 mg by mouth every 6 (six) hours as needed  -     azelastine (ASTELIN) 0 1 % nasal spray; 1-2 sprays into each nostril 2 (two) times a day  -     NAPROXEN DR PO; Take by mouth Unknown strength takes prn for migraines          Subjective:   Feels well  Patient ID: Pauline Nunn is a 66 y o  male  The patient presented to this office for the purpose of cardiac follow-up  He is known to history of coronary artery disease status post coronary bypass surgery  In addition he hypertension and hyperlipidemia  The patient been rather well denying any symptoms chest pain shortness of breath palpitation, dizziness or lightheadedness  He no leg edema        The following portions of the patient's history were reviewed and updated as appropriate: allergies, current medications, past family history, past medical history, past social history, past surgical history and problem list     Review of Systems   Respiratory: Negative for apnea, cough, chest tightness, shortness of breath and wheezing  Cardiovascular: Negative for chest pain, palpitations and leg swelling  Gastrointestinal: Negative for abdominal pain  Neurological: Negative for dizziness and light-headedness  Psychiatric/Behavioral: Negative  Objective:  Stable cardiac-wise  /60 (BP Location: Left arm, Patient Position: Sitting, Cuff Size: Large)   Pulse 67   Ht 5' 5" (1 651 m)   Wt 76 kg (167 lb 9 6 oz)   SpO2 97%   BMI 27 89 kg/m²          Physical Exam  Vitals reviewed  Constitutional:       General: He is not in acute distress  Appearance: He is well-developed  He is not diaphoretic  HENT:      Head: Normocephalic  Eyes:      Pupils: Pupils are equal, round, and reactive to light  Neck:      Thyroid: No thyromegaly  Vascular: No JVD  Cardiovascular:      Rate and Rhythm: Normal rate and regular rhythm  Heart sounds: S1 normal and S2 normal  No murmur heard  No friction rub  No gallop  Pulmonary:      Effort: Pulmonary effort is normal  No respiratory distress  Breath sounds: Normal breath sounds  No wheezing or rales  Chest:      Chest wall: No tenderness  Abdominal:      Palpations: Abdomen is soft  Musculoskeletal:         General: No tenderness or deformity  Normal range of motion  Cervical back: Normal range of motion  Skin:     General: Skin is warm and dry  Neurological:      Mental Status: He is alert and oriented to person, place, and time

## 2021-08-09 NOTE — PROGRESS NOTES
Assessment/Plan:    Atherosclerosis of native coronary artery    Coronary artery disease, stable  No symptoms of angina or signs of heart failure  We will continue present regimen  Hypertension    Hypertension, stable and adequately controlled  Hyperlipidemia    Hyperlipidemia, stable  The patient will continue Zetia and rosuvastatin  Diagnoses and all orders for this visit:    Atherosclerosis of native coronary artery of native heart without angina pectoris  -     NM myocardial perfusion spect (stress and/or rest); Future    Essential hypertension    Hyperglycemia    Mixed hyperlipidemia    Other orders  -     acetaminophen (TYLENOL) 325 mg tablet; Take 650 mg by mouth every 6 (six) hours as needed  -     azelastine (ASTELIN) 0 1 % nasal spray; 1-2 sprays into each nostril 2 (two) times a day  -     NAPROXEN DR PO; Take by mouth Unknown strength takes prn for migraines          Subjective:   Feels well  Patient ID: Shivani Maldonado is a 66 y o  male  The patient presented to this office for the purpose of cardiac follow-up  He is known to history of coronary artery disease status post coronary bypass surgery  In addition he hypertension and hyperlipidemia  The patient been rather well denying any symptoms chest pain shortness of breath palpitation, dizziness or lightheadedness  He no leg edema  The following portions of the patient's history were reviewed and updated as appropriate: allergies, current medications, past family history, past medical history, past social history, past surgical history and problem list     Review of Systems   Respiratory: Negative for apnea, cough, chest tightness, shortness of breath and wheezing  Cardiovascular: Negative for chest pain, palpitations and leg swelling  Gastrointestinal: Negative for abdominal pain  Neurological: Negative for dizziness and light-headedness  Psychiatric/Behavioral: Negative            Objective:  Stable cardiac-wise  /60 (BP Location: Left arm, Patient Position: Sitting, Cuff Size: Large)   Pulse 67   Ht 5' 5" (1 651 m)   Wt 76 kg (167 lb 9 6 oz)   SpO2 97%   BMI 27 89 kg/m²          Physical Exam  Vitals reviewed  Constitutional:       General: He is not in acute distress  Appearance: He is well-developed  He is not diaphoretic  HENT:      Head: Normocephalic  Eyes:      Pupils: Pupils are equal, round, and reactive to light  Neck:      Thyroid: No thyromegaly  Vascular: No JVD  Cardiovascular:      Rate and Rhythm: Normal rate and regular rhythm  Heart sounds: S1 normal and S2 normal  No murmur heard  No friction rub  No gallop  Pulmonary:      Effort: Pulmonary effort is normal  No respiratory distress  Breath sounds: Normal breath sounds  No wheezing or rales  Chest:      Chest wall: No tenderness  Abdominal:      Palpations: Abdomen is soft  Musculoskeletal:         General: No tenderness or deformity  Normal range of motion  Cervical back: Normal range of motion  Skin:     General: Skin is warm and dry  Neurological:      Mental Status: He is alert and oriented to person, place, and time

## 2021-08-09 NOTE — ASSESSMENT & PLAN NOTE
Coronary artery disease, stable  No symptoms of angina or signs of heart failure  We will continue present regimen

## 2021-08-27 ENCOUNTER — HOSPITAL ENCOUNTER (OUTPATIENT)
Dept: NON INVASIVE DIAGNOSTICS | Facility: CLINIC | Age: 71
Discharge: HOME | End: 2021-08-27
Payer: MEDICARE

## 2021-08-27 DIAGNOSIS — I25.10 ATHEROSCLEROSIS OF NATIVE CORONARY ARTERY OF NATIVE HEART WITHOUT ANGINA PECTORIS: Chronic | ICD-10-CM

## 2021-08-27 LAB
CHEST PAIN STATEMENT: NORMAL
MAX DIASTOLIC BP: 76 MMHG
MAX HEART RATE: 141 BPM
MAX PREDICTED HEART RATE: 147 BPM
MAX. SYSTOLIC BP: 170 MMHG
PROTOCOL NAME: NORMAL
REASON FOR TERMINATION: NORMAL
TARGET HR FORMULA: NORMAL
TEST INDICATION: NORMAL
TIME IN EXERCISE PHASE: NORMAL

## 2021-08-27 PROCEDURE — 78452 HT MUSCLE IMAGE SPECT MULT: CPT

## 2021-08-27 PROCEDURE — A9502 TC99M TETROFOSMIN: HCPCS

## 2021-08-27 PROCEDURE — 93016 CV STRESS TEST SUPVJ ONLY: CPT | Performed by: INTERNAL MEDICINE

## 2021-08-27 PROCEDURE — 78452 HT MUSCLE IMAGE SPECT MULT: CPT | Performed by: INTERNAL MEDICINE

## 2021-08-27 PROCEDURE — 93017 CV STRESS TEST TRACING ONLY: CPT

## 2021-08-27 PROCEDURE — 93018 CV STRESS TEST I&R ONLY: CPT | Performed by: INTERNAL MEDICINE

## 2021-09-01 ENCOUNTER — TELEPHONE (OUTPATIENT)
Dept: CARDIOLOGY CLINIC | Facility: CLINIC | Age: 71
End: 2021-09-01

## 2022-02-21 ENCOUNTER — OFFICE VISIT (OUTPATIENT)
Dept: CARDIOLOGY CLINIC | Facility: CLINIC | Age: 72
End: 2022-02-21
Payer: MEDICARE

## 2022-02-21 VITALS
HEART RATE: 69 BPM | OXYGEN SATURATION: 97 % | HEIGHT: 65 IN | SYSTOLIC BLOOD PRESSURE: 108 MMHG | BODY MASS INDEX: 28.32 KG/M2 | WEIGHT: 170 LBS | DIASTOLIC BLOOD PRESSURE: 60 MMHG

## 2022-02-21 DIAGNOSIS — E78.2 MIXED HYPERLIPIDEMIA: Chronic | ICD-10-CM

## 2022-02-21 DIAGNOSIS — I10 PRIMARY HYPERTENSION: Chronic | ICD-10-CM

## 2022-02-21 DIAGNOSIS — I25.10 ATHEROSCLEROSIS OF NATIVE CORONARY ARTERY OF NATIVE HEART WITHOUT ANGINA PECTORIS: Primary | Chronic | ICD-10-CM

## 2022-02-21 PROCEDURE — 99214 OFFICE O/P EST MOD 30 MIN: CPT | Performed by: INTERNAL MEDICINE

## 2022-02-21 NOTE — PROGRESS NOTES
Assessment/Plan:    Hyperlipidemia    Hyperlipidemia, stable  The patient will continue rosuvastatin at 10 mg  Every other day  Will continue Zetia 10 mg daily as well  Hypertension    Hypertension, stable and adequately controlled  Atherosclerosis of native coronary artery   Coronary artery disease, stable  No symptoms of angina or signs of heart failure  Diagnoses and all orders for this visit:    Atherosclerosis of native coronary artery of native heart without angina pectoris    Primary hypertension    Mixed hyperlipidemia          Subjective:   Feels well  Patient ID: Jeff Cazares is a 66 y o  male  The patient presented to this office for the purpose of cardiac follow-up  He is known to have a history of coronary artery disease with hypertension and hyperlipidemia  Patient has been feeling well from the cardiac standpoint denying any symptoms of chest pain, shortness of breath, palpitation, dizziness or lightheadedness  He has no leg edema  The following portions of the patient's history were reviewed and updated as appropriate: allergies, current medications, past family history, past medical history, past social history, past surgical history and problem list     Review of Systems   Respiratory: Negative for apnea, cough, chest tightness, shortness of breath and wheezing  Cardiovascular: Negative for chest pain, palpitations and leg swelling  Gastrointestinal: Negative for abdominal pain  Neurological: Negative for dizziness and light-headedness  Psychiatric/Behavioral: Negative  Objective:  Stable cardiac-wise        /60 (BP Location: Left arm, Patient Position: Sitting, Cuff Size: Adult)   Pulse 69   Ht 5' 5" (1 651 m)   Wt 77 1 kg (170 lb)   SpO2 97%   BMI 28 29 kg/m²          Physical Exam

## 2022-02-21 NOTE — ASSESSMENT & PLAN NOTE
Hyperlipidemia, stable  The patient will continue rosuvastatin at 10 mg  Every other day  Will continue Zetia 10 mg daily as well

## 2022-02-21 NOTE — LETTER
February 21, 2022     Josue Ro MD  [address] [facility]  [address]    Patient: Sabra Abdalla   YOB: 1950   Date of Visit: 2/21/2022       Dear Dr Mariah Cai: Thank you for referring Gael Hampton to me for evaluation  Below are my notes for this consultation  If you have questions, please do not hesitate to call me  I look forward to following your patient along with you  Sincerely,        Deb Bautista MD        CC: No Recipients  Deb Bautista MD  2/21/2022  4:14 PM  Sign when Signing Visit  Assessment/Plan:    Hyperlipidemia    Hyperlipidemia, stable  The patient will continue rosuvastatin at 10 mg  Every other day  Will continue Zetia 10 mg daily as well  Hypertension    Hypertension, stable and adequately controlled  Atherosclerosis of native coronary artery   Coronary artery disease, stable  No symptoms of angina or signs of heart failure  Diagnoses and all orders for this visit:    Atherosclerosis of native coronary artery of native heart without angina pectoris    Primary hypertension    Mixed hyperlipidemia          Subjective:   Feels well  Patient ID: aSbra Abdalla is a 66 y o  male  The patient presented to this office for the purpose of cardiac follow-up  He is known to have a history of coronary artery disease with hypertension and hyperlipidemia  Patient has been feeling well from the cardiac standpoint denying any symptoms of chest pain, shortness of breath, palpitation, dizziness or lightheadedness  He has no leg edema  The following portions of the patient's history were reviewed and updated as appropriate: allergies, current medications, past family history, past medical history, past social history, past surgical history and problem list     Review of Systems   Respiratory: Negative for apnea, cough, chest tightness, shortness of breath and wheezing      Cardiovascular: Negative for chest pain, palpitations and leg swelling  Gastrointestinal: Negative for abdominal pain  Neurological: Negative for dizziness and light-headedness  Psychiatric/Behavioral: Negative  Objective:  Stable cardiac-wise        /60 (BP Location: Left arm, Patient Position: Sitting, Cuff Size: Adult)   Pulse 69   Ht 5' 5" (1 651 m)   Wt 77 1 kg (170 lb)   SpO2 97%   BMI 28 29 kg/m²          Physical Exam

## 2022-05-20 ENCOUNTER — APPOINTMENT (OUTPATIENT)
Dept: LAB | Facility: MEDICAL CENTER | Age: 72
End: 2022-05-20
Payer: MEDICARE

## 2022-05-20 DIAGNOSIS — N13.8 ENLARGED PROSTATE WITH URINARY OBSTRUCTION: ICD-10-CM

## 2022-05-20 DIAGNOSIS — N40.1 ENLARGED PROSTATE WITH URINARY OBSTRUCTION: ICD-10-CM

## 2022-05-20 LAB
BUN SERPL-MCNC: 22 MG/DL (ref 5–25)
CREAT SERPL-MCNC: 1.09 MG/DL (ref 0.6–1.3)
GFR SERPL CREATININE-BSD FRML MDRD: 66 ML/MIN/1.73SQ M
PSA SERPL-MCNC: 2.6 NG/ML (ref 0–4)

## 2022-05-20 PROCEDURE — 84520 ASSAY OF UREA NITROGEN: CPT

## 2022-05-20 PROCEDURE — G0103 PSA SCREENING: HCPCS

## 2022-05-20 PROCEDURE — 36415 COLL VENOUS BLD VENIPUNCTURE: CPT

## 2022-05-20 PROCEDURE — 82565 ASSAY OF CREATININE: CPT

## 2022-08-29 ENCOUNTER — OFFICE VISIT (OUTPATIENT)
Dept: CARDIOLOGY CLINIC | Facility: CLINIC | Age: 72
End: 2022-08-29
Payer: MEDICARE

## 2022-08-29 VITALS
OXYGEN SATURATION: 98 % | DIASTOLIC BLOOD PRESSURE: 68 MMHG | RESPIRATION RATE: 16 BRPM | HEIGHT: 65 IN | TEMPERATURE: 98.2 F | WEIGHT: 163.2 LBS | HEART RATE: 61 BPM | SYSTOLIC BLOOD PRESSURE: 112 MMHG | BODY MASS INDEX: 27.19 KG/M2

## 2022-08-29 DIAGNOSIS — I25.10 ATHEROSCLEROSIS OF NATIVE CORONARY ARTERY OF NATIVE HEART WITHOUT ANGINA PECTORIS: Primary | Chronic | ICD-10-CM

## 2022-08-29 DIAGNOSIS — I10 PRIMARY HYPERTENSION: Chronic | ICD-10-CM

## 2022-08-29 DIAGNOSIS — E78.2 MIXED HYPERLIPIDEMIA: Chronic | ICD-10-CM

## 2022-08-29 PROCEDURE — 99214 OFFICE O/P EST MOD 30 MIN: CPT | Performed by: INTERNAL MEDICINE

## 2022-08-29 NOTE — LETTER
August 29, 2022     Conrado Jaimes MD  [address]    Patient: Peter Mejia   YOB: 1950   Date of Visit: 8/29/2022       Dear Dr Leila Meléndez: Thank you for referring Nunu Mayorga to me for evaluation  Below are my notes for this consultation  If you have questions, please do not hesitate to call me  I look forward to following your patient along with you  Sincerely,        Elizabeth Bryan MD        CC: No Recipients  Elizabeth Bryan MD  8/29/2022  9:19 AM  Sign when Signing Visit  Assessment/Plan:    Atherosclerosis of native coronary artery    Coronary artery disease, stable  No symptoms of angina or signs of heart failure  We will continue present regimen  Hypertension    Hypertension, stable adequately controlled  Hyperlipidemia    Hyperlipidemia, stable  We will continue rosuvastatin  Diagnoses and all orders for this visit:    Atherosclerosis of native coronary artery of native heart without angina pectoris    Primary hypertension    Mixed hyperlipidemia          Subjective:   Feels well  Patient ID: Peter Mejia is a 74 y o  male  The patient presented to this office for the purpose of cardiac follow-up  He is known to have history of coronary artery disease status post coronary bypass surgery  He also has a history of hypertension and hyperlipidemia  The patient is feeling rather well  He denies any symptoms of chest pain, shortness of breath, palpitation, dizziness or lightheadedness  He has no leg edema  The following portions of the patient's history were reviewed and updated as appropriate: allergies, current medications, past family history, past medical history, past social history, past surgical history and problem list     Review of Systems   Respiratory: Negative for apnea, cough, chest tightness, shortness of breath and wheezing      Cardiovascular: Negative for chest pain, palpitations and leg swelling  Gastrointestinal: Negative for abdominal pain  Neurological: Negative for dizziness and light-headedness  Psychiatric/Behavioral: Negative  Objective:  Stable cardiac-wise  /68 (BP Location: Left arm, Patient Position: Sitting, Cuff Size: Standard)   Pulse 61   Temp 98 2 °F (36 8 °C) (Tympanic)   Resp 16   Ht 5' 5" (1 651 m)   Wt 74 kg (163 lb 3 2 oz)   SpO2 98%   BMI 27 16 kg/m²          Physical Exam  Vitals reviewed  Constitutional:       General: He is not in acute distress  Appearance: He is well-developed  He is not diaphoretic  HENT:      Head: Normocephalic  Eyes:      Pupils: Pupils are equal, round, and reactive to light  Neck:      Thyroid: No thyromegaly  Vascular: No JVD  Cardiovascular:      Rate and Rhythm: Normal rate and regular rhythm  Heart sounds: S1 normal and S2 normal  No murmur heard  No friction rub  No gallop  Pulmonary:      Effort: Pulmonary effort is normal  No respiratory distress  Breath sounds: Normal breath sounds  No wheezing or rales  Chest:      Chest wall: No tenderness  Abdominal:      Palpations: Abdomen is soft  Musculoskeletal:         General: No tenderness or deformity  Normal range of motion  Cervical back: Normal range of motion  Right lower leg: No edema  Left lower leg: No edema  Skin:     General: Skin is warm and dry  Neurological:      Mental Status: He is alert and oriented to person, place, and time     Psychiatric:         Mood and Affect: Mood normal          Behavior: Behavior normal

## 2022-08-29 NOTE — PROGRESS NOTES
Assessment/Plan:    Atherosclerosis of native coronary artery    Coronary artery disease, stable  No symptoms of angina or signs of heart failure  We will continue present regimen  Hypertension    Hypertension, stable adequately controlled  Hyperlipidemia    Hyperlipidemia, stable  We will continue rosuvastatin  Diagnoses and all orders for this visit:    Atherosclerosis of native coronary artery of native heart without angina pectoris    Primary hypertension    Mixed hyperlipidemia          Subjective:   Feels well  Patient ID: Emily Valentine is a 74 y o  male  The patient presented to this office for the purpose of cardiac follow-up  He is known to have history of coronary artery disease status post coronary bypass surgery  He also has a history of hypertension and hyperlipidemia  The patient is feeling rather well  He denies any symptoms of chest pain, shortness of breath, palpitation, dizziness or lightheadedness  He has no leg edema  The following portions of the patient's history were reviewed and updated as appropriate: allergies, current medications, past family history, past medical history, past social history, past surgical history and problem list     Review of Systems   Respiratory: Negative for apnea, cough, chest tightness, shortness of breath and wheezing  Cardiovascular: Negative for chest pain, palpitations and leg swelling  Gastrointestinal: Negative for abdominal pain  Neurological: Negative for dizziness and light-headedness  Psychiatric/Behavioral: Negative  Objective:  Stable cardiac-wise  /68 (BP Location: Left arm, Patient Position: Sitting, Cuff Size: Standard)   Pulse 61   Temp 98 2 °F (36 8 °C) (Tympanic)   Resp 16   Ht 5' 5" (1 651 m)   Wt 74 kg (163 lb 3 2 oz)   SpO2 98%   BMI 27 16 kg/m²          Physical Exam  Vitals reviewed  Constitutional:       General: He is not in acute distress       Appearance: He is well-developed  He is not diaphoretic  HENT:      Head: Normocephalic  Eyes:      Pupils: Pupils are equal, round, and reactive to light  Neck:      Thyroid: No thyromegaly  Vascular: No JVD  Cardiovascular:      Rate and Rhythm: Normal rate and regular rhythm  Heart sounds: S1 normal and S2 normal  No murmur heard  No friction rub  No gallop  Pulmonary:      Effort: Pulmonary effort is normal  No respiratory distress  Breath sounds: Normal breath sounds  No wheezing or rales  Chest:      Chest wall: No tenderness  Abdominal:      Palpations: Abdomen is soft  Musculoskeletal:         General: No tenderness or deformity  Normal range of motion  Cervical back: Normal range of motion  Right lower leg: No edema  Left lower leg: No edema  Skin:     General: Skin is warm and dry  Neurological:      Mental Status: He is alert and oriented to person, place, and time     Psychiatric:         Mood and Affect: Mood normal          Behavior: Behavior normal

## 2023-03-03 DIAGNOSIS — I25.10 ATHEROSCLEROSIS OF NATIVE CORONARY ARTERY OF NATIVE HEART WITHOUT ANGINA PECTORIS: Primary | ICD-10-CM

## 2023-03-03 DIAGNOSIS — E78.2 MIXED HYPERLIPIDEMIA: ICD-10-CM

## 2023-03-09 ENCOUNTER — APPOINTMENT (OUTPATIENT)
Dept: LAB | Facility: MEDICAL CENTER | Age: 73
End: 2023-03-09

## 2023-03-09 DIAGNOSIS — I25.10 ATHEROSCLEROSIS OF NATIVE CORONARY ARTERY OF NATIVE HEART WITHOUT ANGINA PECTORIS: ICD-10-CM

## 2023-03-09 DIAGNOSIS — E78.2 MIXED HYPERLIPIDEMIA: ICD-10-CM

## 2023-03-09 LAB
ALBUMIN SERPL BCP-MCNC: 4.3 G/DL (ref 3.5–5)
ALP SERPL-CCNC: 74 U/L (ref 46–116)
ALT SERPL W P-5'-P-CCNC: 34 U/L (ref 12–78)
ANION GAP SERPL CALCULATED.3IONS-SCNC: 6 MMOL/L (ref 4–13)
AST SERPL W P-5'-P-CCNC: 25 U/L (ref 5–45)
BILIRUB SERPL-MCNC: 0.65 MG/DL (ref 0.2–1)
BUN SERPL-MCNC: 14 MG/DL (ref 5–25)
CALCIUM SERPL-MCNC: 9.2 MG/DL (ref 8.3–10.1)
CHLORIDE SERPL-SCNC: 104 MMOL/L (ref 96–108)
CHOLEST SERPL-MCNC: 128 MG/DL
CO2 SERPL-SCNC: 26 MMOL/L (ref 21–32)
CREAT SERPL-MCNC: 0.92 MG/DL (ref 0.6–1.3)
GFR SERPL CREATININE-BSD FRML MDRD: 81 ML/MIN/1.73SQ M
GLUCOSE P FAST SERPL-MCNC: 119 MG/DL (ref 65–99)
HDLC SERPL-MCNC: 47 MG/DL
LDLC SERPL CALC-MCNC: 62 MG/DL (ref 0–100)
POTASSIUM SERPL-SCNC: 4 MMOL/L (ref 3.5–5.3)
PROT SERPL-MCNC: 7.4 G/DL (ref 6.4–8.4)
SODIUM SERPL-SCNC: 136 MMOL/L (ref 135–147)
TRIGL SERPL-MCNC: 93 MG/DL

## 2023-03-13 ENCOUNTER — OFFICE VISIT (OUTPATIENT)
Dept: CARDIOLOGY CLINIC | Facility: CLINIC | Age: 73
End: 2023-03-13

## 2023-03-13 VITALS
WEIGHT: 170 LBS | HEART RATE: 78 BPM | BODY MASS INDEX: 28.32 KG/M2 | OXYGEN SATURATION: 96 % | HEIGHT: 65 IN | SYSTOLIC BLOOD PRESSURE: 142 MMHG | DIASTOLIC BLOOD PRESSURE: 80 MMHG

## 2023-03-13 DIAGNOSIS — R07.9 CHEST PAIN, UNSPECIFIED TYPE: ICD-10-CM

## 2023-03-13 DIAGNOSIS — E78.2 MIXED HYPERLIPIDEMIA: Chronic | ICD-10-CM

## 2023-03-13 DIAGNOSIS — I25.10 ATHEROSCLEROSIS OF NATIVE CORONARY ARTERY OF NATIVE HEART WITHOUT ANGINA PECTORIS: Primary | Chronic | ICD-10-CM

## 2023-03-13 DIAGNOSIS — I10 PRIMARY HYPERTENSION: Chronic | ICD-10-CM

## 2023-03-13 NOTE — LETTER
March 13, 2023     Rogerio Johnson, [address] [facility] [address]    Patient: Niru Nathan   YOB: 1950   Date of Visit: 3/13/2023       Dear Dr Sonia Aldana: Thank you for referring Joaquin Olvera to me for evaluation  Below are my notes for this consultation  If you have questions, please do not hesitate to call me  I look forward to following your patient along with you  Sincerely,        Alma Galvin MD        CC: No Recipients  Alma Galvin MD  3/13/2023  9:09 AM  Sign when Signing Visit  Assessment/Plan:    Atherosclerosis of native coronary artery  Coronary artery disease  The patient is experiencing exercise related chest discomfort suggestive of angina  There is no resting chest pain  No additional symptoms are described  I will arrange for the patient to have an echocardiogram and nuclear stress test     Hypertension  Hypertension, stable and adequately controlled  Hyperlipidemia  Hyperlipidemia, stable  Diagnoses and all orders for this visit:    Atherosclerosis of native coronary artery of native heart without angina pectoris  -     POCT ECG  -     NM myocardial perfusion spect (rx stress and/or rest); Future  -     Echo complete w/ contrast if indicated; Future    Primary hypertension    Mixed hyperlipidemia    Chest pain, unspecified type  -     POCT ECG  -     NM myocardial perfusion spect (rx stress and/or rest); Future  -     Echo complete w/ contrast if indicated; Future         Subjective: Chest discomfort on exertion    Patient ID: Tho Montano is a 74 y o  male  The patient presented to this office for the purpose of cardiac follow-up  He is known to have a history of hypertension and hyperlipidemia as well as coronary artery disease status post coronary bypass surgery    Approximately 2 weeks ago the patient was walking up the hill and noticed some tightness in the chest that lasted for approximately 10 minutes and resolved as he finished walking  On the way back the same thing occurred  Nevertheless the patient is able to climb up to his job proximately 3 stories high without exacerbating any symptoms he denies any symptoms of palpitation, dizziness or lightheadedness  He has no leg edema  The following portions of the patient's history were reviewed and updated as appropriate: allergies, current medications, past family history, past medical history, past social history, past surgical history and problem list     Review of Systems   Respiratory: Negative for apnea, cough, chest tightness, shortness of breath and wheezing  Cardiovascular: Positive for chest pain  Negative for palpitations and leg swelling  Gastrointestinal: Negative for abdominal pain  Neurological: Negative for dizziness and light-headedness  Psychiatric/Behavioral: Negative  Objective: Stable cardiac wise  /80 (BP Location: Left arm, Patient Position: Sitting, Cuff Size: Standard)   Pulse 78   Ht 5' 5" (1 651 m)   Wt 77 1 kg (170 lb)   SpO2 96%   BMI 28 29 kg/m²         Physical Exam  Vitals reviewed  Constitutional:       General: He is not in acute distress  Appearance: He is well-developed  He is not diaphoretic  HENT:      Head: Normocephalic  Eyes:      Pupils: Pupils are equal, round, and reactive to light  Neck:      Thyroid: No thyromegaly  Vascular: No JVD  Cardiovascular:      Rate and Rhythm: Normal rate and regular rhythm  Heart sounds: S1 normal and S2 normal  No murmur heard  No friction rub  No gallop  Pulmonary:      Effort: Pulmonary effort is normal  No respiratory distress  Breath sounds: Normal breath sounds  No wheezing or rales  Chest:      Chest wall: No tenderness  Abdominal:      Palpations: Abdomen is soft  Musculoskeletal:         General: No tenderness or deformity  Normal range of motion  Cervical back: Normal range of motion        Right lower leg: No edema  Left lower leg: No edema  Skin:     General: Skin is warm and dry  Neurological:      Mental Status: He is alert and oriented to person, place, and time     Psychiatric:         Mood and Affect: Mood normal          Behavior: Behavior normal

## 2023-03-13 NOTE — ASSESSMENT & PLAN NOTE
Coronary artery disease  The patient is experiencing exercise related chest discomfort suggestive of angina  There is no resting chest pain  No additional symptoms are described    I will arrange for the patient to have an echocardiogram and nuclear stress test

## 2023-03-13 NOTE — PATIENT INSTRUCTIONS
The patient will be scheduled for echocardiogram and nuclear stress test   We will continue present medications for now

## 2023-03-13 NOTE — PROGRESS NOTES
Assessment/Plan:    Atherosclerosis of native coronary artery  Coronary artery disease  The patient is experiencing exercise related chest discomfort suggestive of angina  There is no resting chest pain  No additional symptoms are described  I will arrange for the patient to have an echocardiogram and nuclear stress test     Hypertension  Hypertension, stable and adequately controlled  Hyperlipidemia  Hyperlipidemia, stable  Diagnoses and all orders for this visit:    Atherosclerosis of native coronary artery of native heart without angina pectoris  -     POCT ECG  -     NM myocardial perfusion spect (rx stress and/or rest); Future  -     Echo complete w/ contrast if indicated; Future    Primary hypertension    Mixed hyperlipidemia    Chest pain, unspecified type  -     POCT ECG  -     NM myocardial perfusion spect (rx stress and/or rest); Future  -     Echo complete w/ contrast if indicated; Future          Subjective: Chest discomfort on exertion     Patient ID: Ty Bowen is a 74 y o  male  The patient presented to this office for the purpose of cardiac follow-up  He is known to have a history of hypertension and hyperlipidemia as well as coronary artery disease status post coronary bypass surgery  Approximately 2 weeks ago the patient was walking up the hill and noticed some tightness in the chest that lasted for approximately 10 minutes and resolved as he finished walking  On the way back the same thing occurred  Nevertheless the patient is able to climb up to his job proximately 3 stories high without exacerbating any symptoms he denies any symptoms of palpitation, dizziness or lightheadedness  He has no leg edema        The following portions of the patient's history were reviewed and updated as appropriate: allergies, current medications, past family history, past medical history, past social history, past surgical history and problem list     Review of Systems   Respiratory: Negative for apnea, cough, chest tightness, shortness of breath and wheezing  Cardiovascular: Positive for chest pain  Negative for palpitations and leg swelling  Gastrointestinal: Negative for abdominal pain  Neurological: Negative for dizziness and light-headedness  Psychiatric/Behavioral: Negative  Objective: Stable cardiac wise  /80 (BP Location: Left arm, Patient Position: Sitting, Cuff Size: Standard)   Pulse 78   Ht 5' 5" (1 651 m)   Wt 77 1 kg (170 lb)   SpO2 96%   BMI 28 29 kg/m²          Physical Exam  Vitals reviewed  Constitutional:       General: He is not in acute distress  Appearance: He is well-developed  He is not diaphoretic  HENT:      Head: Normocephalic  Eyes:      Pupils: Pupils are equal, round, and reactive to light  Neck:      Thyroid: No thyromegaly  Vascular: No JVD  Cardiovascular:      Rate and Rhythm: Normal rate and regular rhythm  Heart sounds: S1 normal and S2 normal  No murmur heard  No friction rub  No gallop  Pulmonary:      Effort: Pulmonary effort is normal  No respiratory distress  Breath sounds: Normal breath sounds  No wheezing or rales  Chest:      Chest wall: No tenderness  Abdominal:      Palpations: Abdomen is soft  Musculoskeletal:         General: No tenderness or deformity  Normal range of motion  Cervical back: Normal range of motion  Right lower leg: No edema  Left lower leg: No edema  Skin:     General: Skin is warm and dry  Neurological:      Mental Status: He is alert and oriented to person, place, and time     Psychiatric:         Mood and Affect: Mood normal          Behavior: Behavior normal

## 2023-04-03 ENCOUNTER — TELEPHONE (OUTPATIENT)
Dept: CARDIOLOGY CLINIC | Facility: CLINIC | Age: 73
End: 2023-04-03

## 2023-04-03 DIAGNOSIS — R07.9 CHEST PAIN, UNSPECIFIED TYPE: Primary | ICD-10-CM

## 2023-04-03 NOTE — TELEPHONE ENCOUNTER
Mr Jayne Ferrer called to clarify the type of stress test that he is scheduled to have done on 4/5  The order is for a lexiscan stress test, and Mr Jayne Ferrer said that type of stress test wasn't discussed at the office visit  Since he is able to walk on a treadmill, he wants to make sure he is scheduled for the correct type of stress test     Please advise

## 2023-04-05 ENCOUNTER — HOSPITAL ENCOUNTER (OUTPATIENT)
Dept: NON INVASIVE DIAGNOSTICS | Facility: CLINIC | Age: 73
Discharge: HOME | End: 2023-04-05

## 2023-04-05 VITALS — OXYGEN SATURATION: 97 % | DIASTOLIC BLOOD PRESSURE: 78 MMHG | HEART RATE: 54 BPM | SYSTOLIC BLOOD PRESSURE: 120 MMHG

## 2023-04-05 VITALS
BODY MASS INDEX: 28.32 KG/M2 | HEIGHT: 65 IN | DIASTOLIC BLOOD PRESSURE: 80 MMHG | SYSTOLIC BLOOD PRESSURE: 142 MMHG | HEART RATE: 60 BPM | WEIGHT: 170 LBS

## 2023-04-05 DIAGNOSIS — R07.9 CHEST PAIN, UNSPECIFIED TYPE: ICD-10-CM

## 2023-04-05 DIAGNOSIS — I25.10 ATHEROSCLEROSIS OF NATIVE CORONARY ARTERY OF NATIVE HEART WITHOUT ANGINA PECTORIS: Chronic | ICD-10-CM

## 2023-04-05 LAB
AORTIC ROOT: 3.1 CM
APICAL FOUR CHAMBER EJECTION FRACTION: 63 %
ASCENDING AORTA: 3.1 CM
E WAVE DECELERATION TIME: 216 MS
FRACTIONAL SHORTENING: 33 (ref 28–44)
INTERVENTRICULAR SEPTUM IN DIASTOLE (PARASTERNAL SHORT AXIS VIEW): 1 CM
INTERVENTRICULAR SEPTUM: 1 CM (ref 0.6–1.1)
LAAS-AP2: 19.2 CM2
LAAS-AP4: 16.6 CM2
LEFT ATRIUM SIZE: 4.8 CM
LEFT INTERNAL DIMENSION IN SYSTOLE: 2.9 CM (ref 2.1–4)
LEFT VENTRICULAR INTERNAL DIMENSION IN DIASTOLE: 4.3 CM (ref 3.5–6)
LEFT VENTRICULAR POSTERIOR WALL IN END DIASTOLE: 1 CM
LEFT VENTRICULAR STROKE VOLUME: 50 ML
LVSV (TEICH): 50 ML
MAX HR PERCENT: 92 %
MAX HR: 142 BPM
MV E'TISSUE VEL-SEP: 7 CM/S
MV PEAK A VEL: 1 M/S
MV PEAK E VEL: 90 CM/S
MV STENOSIS PRESSURE HALF TIME: 63 MS
MV VALVE AREA P 1/2 METHOD: 3.49
NUC STRESS EJECTION FRACTION: 72 %
RA PRESSURE ESTIMATED: 3 MMHG
RATE PRESSURE PRODUCT: NORMAL
RIGHT ATRIUM AREA SYSTOLE A4C: 14.4 CM2
RIGHT VENTRICLE ID DIMENSION: 3.6 CM
RV PSP: 23 MMHG
SL CV LEFT ATRIUM LENGTH A2C: 6.1 CM
SL CV LV EF: 60
SL CV PED ECHO LEFT VENTRICLE DIASTOLIC VOLUME (MOD BIPLANE) 2D: 83 ML
SL CV PED ECHO LEFT VENTRICLE SYSTOLIC VOLUME (MOD BIPLANE) 2D: 33 ML
SL CV REST NUCLEAR ISOTOPE DOSE: 10.39 MCI
SL CV STRESS NUCLEAR ISOTOPE DOSE: 30.9 MCI
SL CV STRESS RECOVERY BP: NORMAL MMHG
SL CV STRESS RECOVERY HR: 83 BPM
SL CV STRESS RECOVERY O2 SAT: 99 %
SL CV STRESS STAGE REACHED: 3
STRESS ANGINA INDEX: 0
STRESS BASELINE BP: NORMAL MMHG
STRESS BASELINE HR: 54 BPM
STRESS O2 SAT REST: 97 %
STRESS PEAK HR: 137 BPM
STRESS POST ESTIMATED WORKLOAD: 10.1 METS
STRESS POST EXERCISE DUR MIN: 8 MIN
STRESS POST EXERCISE DUR SEC: 0 SEC
STRESS POST O2 SAT PEAK: 98 %
STRESS POST PEAK BP: 158 MMHG
STRESS/REST PERFUSION RATIO: 0.88
TR MAX PG: 20 MMHG
TR PEAK VELOCITY: 2.3 M/S
TRICUSPID ANNULAR PLANE SYSTOLIC EXCURSION: 1.9 CM
TRICUSPID VALVE PEAK REGURGITATION VELOCITY: 2.26 M/S

## 2023-04-06 LAB
CHEST PAIN STATEMENT: NORMAL
MAX DIASTOLIC BP: 82 MMHG
MAX HEART RATE: 142 BPM
MAX PREDICTED HEART RATE: 145 BPM
MAX. SYSTOLIC BP: 158 MMHG
PROTOCOL NAME: NORMAL
REASON FOR TERMINATION: NORMAL
TARGET HR FORMULA: NORMAL
TEST INDICATION: NORMAL
TIME IN EXERCISE PHASE: NORMAL

## 2023-04-21 ENCOUNTER — APPOINTMENT (OUTPATIENT)
Dept: LAB | Facility: MEDICAL CENTER | Age: 73
End: 2023-04-21

## 2023-04-25 ENCOUNTER — HOSPITAL ENCOUNTER (OUTPATIENT)
Facility: HOSPITAL | Age: 73
Setting detail: OUTPATIENT SURGERY
Discharge: HOME | End: 2023-04-26
Attending: INTERNAL MEDICINE | Admitting: INTERNAL MEDICINE

## 2023-04-25 DIAGNOSIS — Z95.5 STATUS POST INSERTION OF DRUG ELUTING CORONARY ARTERY STENT: Primary | ICD-10-CM

## 2023-04-25 DIAGNOSIS — I25.119 CORONARY ARTERY DISEASE INVOLVING NATIVE HEART WITH ANGINA PECTORIS, UNSPECIFIED VESSEL OR LESION TYPE (HCC): ICD-10-CM

## 2023-04-25 LAB
ATRIAL RATE: 48 BPM
ATRIAL RATE: 50 BPM
KCT BLD-ACNC: 299 SEC (ref 89–137)
P AXIS: 25 DEGREES
P AXIS: 37 DEGREES
PR INTERVAL: 174 MS
PR INTERVAL: 180 MS
QRS AXIS: 2 DEGREES
QRS AXIS: 2 DEGREES
QRSD INTERVAL: 88 MS
QRSD INTERVAL: 92 MS
QT INTERVAL: 452 MS
QT INTERVAL: 462 MS
QTC INTERVAL: 412 MS
QTC INTERVAL: 412 MS
SPECIMEN SOURCE: ABNORMAL
T WAVE AXIS: 26 DEGREES
T WAVE AXIS: 34 DEGREES
VENTRICULAR RATE: 48 BPM
VENTRICULAR RATE: 50 BPM

## 2023-04-25 DEVICE — STENT ONYXNG35018UX ONYX 3.50X18RX
Type: IMPLANTABLE DEVICE | Site: CORONARY | Status: FUNCTIONAL
Brand: ONYX FRONTIER™

## 2023-04-25 DEVICE — STENT ONYXNG27522UX ONYX 2.75X22RX
Type: IMPLANTABLE DEVICE | Site: CORONARY | Status: FUNCTIONAL
Brand: ONYX FRONTIER™

## 2023-04-25 RX ORDER — ONDANSETRON 2 MG/ML
4 INJECTION INTRAMUSCULAR; INTRAVENOUS EVERY 6 HOURS PRN
Status: DISCONTINUED | OUTPATIENT
Start: 2023-04-25 | End: 2023-04-26 | Stop reason: HOSPADM

## 2023-04-25 RX ORDER — FENTANYL CITRATE 50 UG/ML
INJECTION, SOLUTION INTRAMUSCULAR; INTRAVENOUS CODE/TRAUMA/SEDATION MEDICATION
Status: DISCONTINUED | OUTPATIENT
Start: 2023-04-25 | End: 2023-04-25 | Stop reason: HOSPADM

## 2023-04-25 RX ORDER — SODIUM CHLORIDE 9 MG/ML
125 INJECTION, SOLUTION INTRAVENOUS CONTINUOUS
Status: DISCONTINUED | OUTPATIENT
Start: 2023-04-25 | End: 2023-04-26 | Stop reason: HOSPADM

## 2023-04-25 RX ORDER — CLOPIDOGREL BISULFATE 75 MG/1
75 TABLET ORAL DAILY
Status: DISCONTINUED | OUTPATIENT
Start: 2023-04-26 | End: 2023-04-26 | Stop reason: HOSPADM

## 2023-04-25 RX ORDER — CLOPIDOGREL BISULFATE 75 MG/1
600 TABLET ORAL ONCE
Status: COMPLETED | OUTPATIENT
Start: 2023-04-25 | End: 2023-04-25

## 2023-04-25 RX ORDER — DIPHENHYDRAMINE HCL 25 MG
25 TABLET ORAL
Status: DISCONTINUED | OUTPATIENT
Start: 2023-04-25 | End: 2023-04-26 | Stop reason: HOSPADM

## 2023-04-25 RX ORDER — MIDAZOLAM HYDROCHLORIDE 2 MG/2ML
INJECTION, SOLUTION INTRAMUSCULAR; INTRAVENOUS CODE/TRAUMA/SEDATION MEDICATION
Status: DISCONTINUED | OUTPATIENT
Start: 2023-04-25 | End: 2023-04-25 | Stop reason: HOSPADM

## 2023-04-25 RX ORDER — FAMOTIDINE 20 MG/1
20 TABLET, FILM COATED ORAL DAILY
Status: DISCONTINUED | OUTPATIENT
Start: 2023-04-25 | End: 2023-04-26 | Stop reason: HOSPADM

## 2023-04-25 RX ORDER — CLOPIDOGREL BISULFATE 75 MG/1
75 TABLET ORAL DAILY
COMMUNITY
End: 2023-04-26

## 2023-04-25 RX ORDER — ACETAMINOPHEN 325 MG/1
650 TABLET ORAL EVERY 4 HOURS PRN
Status: DISCONTINUED | OUTPATIENT
Start: 2023-04-25 | End: 2023-04-26 | Stop reason: HOSPADM

## 2023-04-25 RX ORDER — HEPARIN SODIUM 1000 [USP'U]/ML
INJECTION, SOLUTION INTRAVENOUS; SUBCUTANEOUS CODE/TRAUMA/SEDATION MEDICATION
Status: DISCONTINUED | OUTPATIENT
Start: 2023-04-25 | End: 2023-04-25 | Stop reason: HOSPADM

## 2023-04-25 RX ORDER — ASPIRIN 81 MG/1
81 TABLET, CHEWABLE ORAL DAILY
Status: DISCONTINUED | OUTPATIENT
Start: 2023-04-26 | End: 2023-04-26 | Stop reason: HOSPADM

## 2023-04-25 RX ORDER — SODIUM CHLORIDE 9 MG/ML
100 INJECTION, SOLUTION INTRAVENOUS CONTINUOUS
Status: DISPENSED | OUTPATIENT
Start: 2023-04-25 | End: 2023-04-25

## 2023-04-25 RX ORDER — NITROGLYCERIN 20 MG/100ML
INJECTION INTRAVENOUS CODE/TRAUMA/SEDATION MEDICATION
Status: DISCONTINUED | OUTPATIENT
Start: 2023-04-25 | End: 2023-04-25 | Stop reason: HOSPADM

## 2023-04-25 RX ORDER — ASPIRIN 81 MG/1
324 TABLET, CHEWABLE ORAL ONCE
Status: COMPLETED | OUTPATIENT
Start: 2023-04-25 | End: 2023-04-25

## 2023-04-25 RX ORDER — LIDOCAINE HYDROCHLORIDE 10 MG/ML
INJECTION, SOLUTION EPIDURAL; INFILTRATION; INTRACAUDAL; PERINEURAL CODE/TRAUMA/SEDATION MEDICATION
Status: DISCONTINUED | OUTPATIENT
Start: 2023-04-25 | End: 2023-04-25 | Stop reason: HOSPADM

## 2023-04-25 RX ADMIN — CLOPIDOGREL BISULFATE 600 MG: 75 TABLET ORAL at 07:39

## 2023-04-25 RX ADMIN — ASPIRIN 81 MG CHEWABLE TABLET 324 MG: 81 TABLET CHEWABLE at 07:39

## 2023-04-25 RX ADMIN — SODIUM CHLORIDE 125 ML/HR: 0.9 INJECTION, SOLUTION INTRAVENOUS at 07:39

## 2023-04-25 NOTE — H&P
Cardiac Catheterization -  Outpatient H&P  Dirk Nathan 74 y o  male MRN: [id number]  Unit/Bed#: BE CATH LAB ROOM Encounter: [id number]       PCP: Patrica Villanueva MD  Outpatient Cardiologist: Alonso Contreras MD    Risk Factors:  -known CAD, HTN, HLD    History of Present Illness   HPI:  Javad Jackson is a 74 y o  male who presents for diagnostic coronary angiogram in setting of exercise nuclear stress test interpreted as abnormal   The latter was ordered due to exercise related chest discomfort, concerning for angina CCS I  He has a history of coronary artery disease s/p CABG x3 (LIMA-LAD, sequential Y-graft R [id number]) + right coronary artery endarterectomy, essential hypertension, dyslipidemia     4-5-2023 exercise nuclear stress test:  8 min for 10 1 METs, no chest pain but inferolateral ST depressions at 92% MPHR  Basal-mid inferior reversible        Historical Information   Past Medical History:   Diagnosis Date    Arteriosclerotic heart disease     Hyperlipidemia      Past Surgical History:   Procedure Laterality Date    CATARACT EXTRACTION Left 12/2017    CORONARY ARTERY BYPASS GRAFT  2006    x3 (LIMA-LAD, sequential Y-graft R [id number] & distal RCA endarterectomy)    HERNIA REPAIR  11/2020    TONSILLECTOMY       Social History   Social History     Substance and Sexual Activity   Alcohol Use Yes    Comment: social     Social History     Substance and Sexual Activity   Drug Use No     Social History     Tobacco Use   Smoking Status Never   Smokeless Tobacco Never     Family History:   Family History   Problem Relation Age of Onset    Hypertension Mother     No Known Problems Father        Meds/Allergies   all medications and allergies reviewed  No Known Allergies    Physical Exam  There were no vitals taken for this visit    (Pending input)      GEN: Javad Jackson appears well, alert and oriented x 3, pleasant and cooperative   HEENT:  Normocephalic, atraumatic, anicteric, moist mucous membranes  NECK: No JVD  HEART: Regular rhythm, normal rate, normal S1 and S2, no murmur  LUNGS: Clear to auscultation bilaterally; respiration nonlabored   ABDOMEN:  Soft, no tenderness, no distention  EXTREMITIES: No edema  NEURO: no gross focal findings; cranial nerves grossly intact   SKIN:  Dry, intact, warm to touch  ACCESS: Normal left radial Johann's, 2+ right femoral pulse      Previous Cath/PCI:  1- coronary angiogram via right femoral artery approach        Previous STRESS TEST:  Results for orders placed during the hospital encounter of 04/05/23    NM myocardial perfusion spect (stress and/or rest)    Interpretation Summary    Resting ECG: ECG is normal  The ECG shows normal sinus rhythm    Stress ECG: Overall, the patient's exercise capacity was normal for their age  The patient reached stage 3 0 of the protocol after exercising for 8 min and 0 sec and had a maximal HR of 142 bpm (92 % of MPHR) and 10 1 METS    Stress ECG: ST depression in the inferolateral and high-lateral leads is noted  The ECG was positive for ischemia  The stress ECG is consistent with ischemia after maximal exercise, with reproduction of symptoms    Stress Function: Left ventricular function post-stress is normal  Post-stress ejection fraction is 72 %    Perfusion Defect Conclusion: The stress/rest perfusion ratio is 0 88   There is no evidence of transient ischemic dilation (TID)    Perfusion: There is a left ventricular perfusion defect that is medium in size with moderate reduction in uptake present in the entire inferior location(s) that is reversible consistent with ischemia    Stress Combined Conclusion: The ECG and SPECT imaging portions of the stress study are concordant with findings concerning for stress induced myocardial ischemia  Findings are consistent with ischemia        ECHO:  Results for orders placed during the hospital encounter of 04/05/23    Echo complete w/ contrast if indicated    Interpretation Summary    Left Ventricle: Left ventricular cavity size is normal  Wall thickness is normal  The left ventricular ejection fraction is 60%  Systolic function is normal  Wall motion is normal  Diastolic function is mildly abnormal, consistent with grade I (abnormal) relaxation    Left Atrium: The atrium is mildly dilated    Mitral Valve: There is moderate regurgitation    Tricuspid Valve: There is mild to moderate regurgitation  The estimated right ventricular systolic pressure is 35 68 mmHg    Pulmonic Valve: There is mild regurgitation  SHAVON:  No results found for this or any previous visit  CMR:  No results found for this or any previous visit  Lab Results: I have personally reviewed pertinent lab results  Results from last 7 days   Lab Units 04/21/23  0816   POTASSIUM mmol/L 4 2   CO2 mmol/L 28   CHLORIDE mmol/L 106   BUN mg/dL 17   CREATININE mg/dL 1 02   ALT U/L 38   AST U/L 24             Results from last 7 days   Lab Units 04/21/23  0816   WBC Thousand/uL 6 09   HEMOGLOBIN g/dL 14 8   HEMATOCRIT % 42 9   MCV fL 94   PLATELETS Thousands/uL 209             Assessment/Plan     Assessment:  74 y o  male patient who presents for diagnostic coronary angiogram after stress test interpreted as abnormal   He has been experiencing chest discomfort with exercise in the community  1   Abnormal exercise nuclear stress test  - Stress test as above  - 1/23/2006 [facility] Beau Craft,, RFA): 90% ostial LAD with mod diffuse prox-mid, 80% D1, 85% OM4, 95% distal RCA with moderate prox-mid disease  - 1/2006 CABG x3 (Apollo Henao): LIMA-LAD, with Y-graft R [id number], RCA endarterectomy  - 4/5 TTE: LVEF 65%, no WMA, no DD, normal RV size & function, RVSP 23 mmHg  - LDL 62, A1c 5 4% in 2020, Cr 1, no trop  - Aspirin, ezetimibe, rosuvastatin, coQ-10      Plan:  1  Proceed with coronary angiography +/- PCI          Case discussed and reviewed with Dr Rocio Livingston who agrees with my assessment and plan  Thank you for involving us in the care of your patient  Piedad Crowder MD   Interventional Cardiology Fellow  [id number]      ==========================================================================================    Epic/ Allscripts/Care Everywhere records reviewed: Yes    ** Please Note: Fluency DirectDictation voice to text software may have been used in the creation of this document   **

## 2023-04-25 NOTE — DISCHARGE SUMMARY
"Discharge Summary - Inga Nathan 74 y o  male MRN: [id number]    Unit/Bed#: [facility] CATH LAB ROOM Encounter: [id number]    Admission Date: 4/25/2023   Discharge Date: 4/26/2023    Disposition: Home    Condition at Discharge: good     PCP:Guicho Mcadams MD      OP Cardiologist: Jazlyn Aleman    Interventional cardiologist: Dr Meli Paiz    Admitting Diagnosis:  Abnormal stress test  History of CAD status post CABG x3 LIMA to the LAD, sequential Y graft Radial to D1 and OM and SVG to RCA  Secondary Diagnoses:   Hypertension  Hyperlipidemia    Discharge Diagnosis:  CAD status PCI and drug-eluting stents to (NAS FRONTIER 3 5MM X 18MM) to to mid circumflex lesion  The PCI and drug-eluting stent from the mid to distal circumflex lesion (NAS FRONTIER 2 75MM X 22MM)      /68 (BP Location: Right arm)   Pulse 58   Temp 97 6 °F (36 4 °C) (Temporal)   Resp 18   Ht 5' 5 5\" (1 664 m)   Wt 77 1 kg (170 lb)   SpO2 97%   BMI 27 86 kg/m²       Review of Systems   All other systems reviewed and are negative  Physical Exam  Constitutional:       Appearance: Normal appearance  HENT:      Mouth/Throat:      Mouth: Mucous membranes are moist    Cardiovascular:      Rate and Rhythm: Bradycardia present  Pulses: Normal pulses  Pulmonary:      Effort: Pulmonary effort is normal       Breath sounds: Normal breath sounds  Abdominal:      General: Abdomen is flat  Bowel sounds are normal       Palpations: Abdomen is soft  Musculoskeletal:      Right lower leg: Edema present  Left lower leg: No edema  Skin:     Capillary Refill: Capillary refill takes 2 to 3 seconds  Neurological:      Mental Status: He is alert  Right groin no hematoma, moderate amount ecchymosis, no bruitt  HPI and Hospital Course: 40-year-old male with history of heart disease status post CABG x3-LIMA to the LAD, sequential Y graft Radial to D1 and OM and SVG to RCA, hypertension and mixed hyperlipidemia    Patient " was seen by Dr Johnna Luque in the office for angina with activity and exercising  Patient underwent a nuclear stress test which revealed ST depressions in the inferior lateral and high lateral leads  Chest pain symptoms were reproduced during the procedure  Patient was electively admitted for cardiac used to 1 since I was not report okay  Thank you chemo brain question is legitimate but given what ization to identify his coronary anatomy and ischemic burden  Results of the cardiac catheterization as follows:     Three of four grafts patent from index CABG 2006 (Patent SVG-dRCA; Patent Y graft (LIMA to LAD and off mid NGUYEN is a radial graft to first diagonal; per op report radial continued to OM branch but that limb is not seen and is occluded therefore the native circumflex lesions underwent PCI with MAYITO and believed to be culprit for patients angina)    PCI native circumflex with MAYITO proximally and distally    Native 3VD as described    Mid Cx to Dist Cx lesion is 90% stenosed    Ost Cx to Mid Cx lesion is 80% stenosed  Patient underwent PCI and drug-eluting stent (NAS FRONTIER 3 5MM X 18MM) to to mid circumflex lesion  The PCI and drug-eluting stent from the mid to distal circumflex lesion (NAS FRONTIER 2 75MM X 22MM)  Right femoral artery was utilized for the procedure and closed with Perclose  Patient is being discharged home on aspirin 81 mg daily, Plavix 75 mg daily Zetia 10 mg daily Crestor 10 mg daily and metoprolol tartrate 12 5 every 12 hours daily  Cardiac rehab referral has been made  Patient will follow up with Wesley Caruso on 5/9/2023 at 1120 in the [facility] office        Current Facility-Administered Medications   Medication Dose Route Frequency    fentanyl citrate (PF) 100 MCG/2ML   Intravenous Code/Trauma/Sedation Med    heparin (porcine) injection   Intravenous Code/Trauma/Sedation Med    iohexol (OMNIPAQUE) 350 MG/ML injection (SINGLE-DOSE)    Code/Trauma/Sedation Med    lidocaine (PF) (XYLOCAINE-MPF) 1 % injection   Infiltration Code/Trauma/Sedation Med    midazolam (VERSED) injection   Intravenous Code/Trauma/Sedation Med    nitroGLYcerin 200 mcg/mL IC bolus   Intracoronary Code/Trauma/Sedation Med    sodium chloride 0 9 % infusion  125 mL/hr Intravenous Continuous       Pertinent Labs/diagnostics:        Lab Ressults:  Recent Results (from the past 24 hour(s))   ECG 12 lead    Collection Time: 04/25/23  7:14 AM   Result Value Ref Range    Ventricular Rate 50 BPM    Atrial Rate 50 BPM    ME Interval 174 ms    QRSD Interval 88 ms    QT Interval 452 ms    QTC Interval 412 ms    P [place] 25 degrees    QRS Axis 2 degrees    T Wave Axis 26 degrees       Lipid Profile:   No results found for: CHOL  Lab Results   Component Value Date    HDL 47 03/09/2023    HDL 42 09/18/2019    HDL 42 03/15/2019     Lab Results   Component Value Date    LDLCALC 62 03/09/2023     Lab Results   Component Value Date    TRIG 93 03/09/2023    TRIG 81 09/18/2019    TRIG 74 03/15/2019       Tele: Sinus bradycardia    Just fatigue so start radiation Monday no discharge instructions/Information to patient and family:   See after visit summary for information provided to patient and family  Provisions for Follow-Up Care:  See after visit summary for information related to follow-up care and any pertinent home health orders  Planned Readmission: Yes     Discharge Statement:  I spent 45 minutes minutes discharging the patient  This time was spent on the day of discharge  I had direct contact with the patient on the day of discharge  Additional documentation is required if more than 30 minutes were spent on discharge  I** Please Note: Fluency Direct Dictation voice to text software may have been used in the creation of this document   **

## 2023-04-26 VITALS
WEIGHT: 170 LBS | HEART RATE: 72 BPM | HEIGHT: 66 IN | DIASTOLIC BLOOD PRESSURE: 67 MMHG | RESPIRATION RATE: 18 BRPM | BODY MASS INDEX: 27.32 KG/M2 | SYSTOLIC BLOOD PRESSURE: 121 MMHG | OXYGEN SATURATION: 98 % | TEMPERATURE: 98.5 F

## 2023-04-26 PROBLEM — Z95.5 STATUS POST INSERTION OF DRUG ELUTING CORONARY ARTERY STENT: Status: ACTIVE | Noted: 2023-04-26

## 2023-04-26 LAB
ANION GAP SERPL CALCULATED.3IONS-SCNC: 6 MMOL/L (ref 4–13)
BUN SERPL-MCNC: 15 MG/DL (ref 5–25)
CALCIUM SERPL-MCNC: 8.7 MG/DL (ref 8.3–10.1)
CHLORIDE SERPL-SCNC: 107 MMOL/L (ref 96–108)
CO2 SERPL-SCNC: 24 MMOL/L (ref 21–32)
CREAT SERPL-MCNC: 0.91 MG/DL (ref 0.6–1.3)
ERYTHROCYTE [DISTWIDTH] IN BLOOD BY AUTOMATED COUNT: 12.9 % (ref 11.6–15.1)
GFR SERPL CREATININE-BSD FRML MDRD: 82 ML/MIN/1.73SQ M
GLUCOSE SERPL-MCNC: 105 MG/DL (ref 65–140)
HCT VFR BLD AUTO: 41 % (ref 36.5–49.3)
HGB BLD-MCNC: 14.1 G/DL (ref 12–17)
MCH RBC QN AUTO: 32.3 PG (ref 26.8–34.3)
MCHC RBC AUTO-ENTMCNC: 34.4 G/DL (ref 31.4–37.4)
MCV RBC AUTO: 94 FL (ref 82–98)
PLATELET # BLD AUTO: 195 THOUSANDS/UL (ref 149–390)
PMV BLD AUTO: 11 FL (ref 8.9–12.7)
POTASSIUM SERPL-SCNC: 3.8 MMOL/L (ref 3.5–5.3)
RBC # BLD AUTO: 4.37 MILLION/UL (ref 3.88–5.62)
SODIUM SERPL-SCNC: 137 MMOL/L (ref 135–147)
WBC # BLD AUTO: 8.47 THOUSAND/UL (ref 4.31–10.16)

## 2023-04-26 RX ORDER — CLOPIDOGREL BISULFATE 75 MG/1
75 TABLET ORAL DAILY
Qty: 90 TABLET | Refills: 3 | Status: SHIPPED | OUTPATIENT
Start: 2023-04-27

## 2023-04-26 RX ORDER — EZETIMIBE 10 MG/1
10 TABLET ORAL
Status: DISCONTINUED | OUTPATIENT
Start: 2023-04-26 | End: 2023-04-26 | Stop reason: HOSPADM

## 2023-04-26 RX ORDER — ROSUVASTATIN CALCIUM 10 MG/1
10 TABLET, COATED ORAL
Status: DISCONTINUED | OUTPATIENT
Start: 2023-04-26 | End: 2023-04-26 | Stop reason: HOSPADM

## 2023-04-26 RX ADMIN — FAMOTIDINE 20 MG: 20 TABLET, FILM COATED ORAL at 08:19

## 2023-04-26 RX ADMIN — Medication 12.5 MG: at 08:19

## 2023-04-26 RX ADMIN — CLOPIDOGREL BISULFATE 75 MG: 75 TABLET ORAL at 08:19

## 2023-04-26 RX ADMIN — ASPIRIN 81 MG 81 MG: 81 TABLET ORAL at 08:19

## 2023-04-26 NOTE — DISCHARGE INSTR - AVS FIRST PAGE
1  Please see the post angioplasty discharge instructions  No heavy lifting, greater than 10 lbs  or strenuous activity for 5 days  Follow angioplasty discharge instructions  2 Remove band aid tomorrow  Shower and wash area- groin gently with soap and water- beginning tomorrow  Rinse and pat dry  Apply new water seal band aid  Repeat this process for 5 days  No powders, creams lotions or antibiotic ointments  for 5 days  No tub baths, hot tubs or swimming for 5 days  3  Call [place] [address] Cardiology Office ([phone]) if you develop a fever, redness or drainage at your groin access site  4  No driving for 2 days    5  Do not stop aspirin or Plavix (clopiogrel) any reason without a cardiologists consent, or the stent could block up and cause a heart attack  6  Stent card and book      7 Perclose Booklet

## 2023-05-08 NOTE — PROGRESS NOTES
Cardiology Follow Up    Vanessa Nathan  1950  [id number]  [address] [id number] [phone]    1  S/P drug eluting coronary stent placement        2  Hypertension, unspecified type        3  Mixed hyperlipidemia            Interval History:   Mr Lasha Aguilera was admitted to [facility] on 4/25 - 4/26/23 sp MAYITO  On 4/05/23  Mr Teodora Pretty underwent a nuclear stress test which was found to be abnormal   He was scheduled for a cardiac catheterization  4/25/23 LHC showed ostial LAD to proximal % stenosis, lesion chronically occluded  Ostial circumflex to mid circumflex 80% stenosis, mid circumflex distal circumflex 90% stenosis  Proximal RCA to mid % stenosis, lesion is chronically occluded  LIMA to mid LAD normal   Sequential radial artery Y graft branch to first diagonal first marginal moderate in size 100% stenosis between the first diagonal and first marginal   Graft to the RPDA Minor luminal irregularities  Sp PCI and Fritz [place] MAYITO placed across the Ost Cx to Mid Cx 80% stenosis, 0% residual stenosis  Mid Cx to distal Cx lesion PCI with Fritz [place] MAYITO placed across the 90% stenosis, 0% residual stenosis  He was discharged on ASA and Plavix  Cardiac Rehabilitation ordered at discharge  Mr Lasha Aguilera presents to our office for a recent hospitalization follow up visit  He denies dyspnea with minimal minor exertion chest pain palpitation lightheadedness or dizziness  He is walking without symptoms      Medical History   Primary Cardiologist Dr Lucia Gitelman  CAD  CABG x 4 LIMA to LAD, Y graft to D1 and OM, SVG to RCA  Hypertension  Hyperlipidemia 3/09/23 , TG 93, HDL 47, LDL 62       Patient Active Problem List   Diagnosis   • Abdominal wall hernia   • Anemia   • Arthritis   • Asymptomatic carotid artery stenosis   • Coronary artery disease involving native coronary artery   • Cholelithiases   • Benign prostate hyperplasia   • Atherosclerosis of native coronary artery   • Dupuytren's contracture   • Elevated ALT measurement   • Elevated AST (SGOT)   • Gastroesophageal reflux disease   • Headache   • Hyperglycemia   • Hyperlipidemia   • Hypertension   • Migraine headache   • Seasonal allergies   • Vitamin D deficiency   • Umbilical hernia   • Diarrhea   • Upper respiratory tract infection   • Status post insertion of drug eluting coronary artery stent     Past Medical History:   Diagnosis Date   • Arteriosclerotic heart disease    • Hyperlipidemia      Social History     Socioeconomic History   • Marital status: /Civil Union     Spouse name: Not on file   • Number of children: Not on file   • Years of education: Not on file   • Highest education level: Not on file   Occupational History   • Not on file   Tobacco Use   • Smoking status: Never   • Smokeless tobacco: Never   Vaping Use   • Vaping Use: Never used   Substance and Sexual Activity   • Alcohol use: Yes     Comment: social   • Drug use: No   • Sexual activity: Not on file   Other Topics Concern   • Not on file   Social History Narrative   • Not on file     Social Determinants of Health     Financial Resource Strain: Not on file   Food Insecurity: Not on file   Transportation Needs: Not on file   Physical Activity: Not on file   Stress: Not on file   Social Connections: Not on file   Intimate Partner Violence: Not on file   Housing Stability: Not on file      Family History   Problem Relation Age of Onset   • Hypertension Mother    • No Known Problems Father      Past Surgical History:   Procedure Laterality Date   • CARDIAC CATHETERIZATION N/A 4/25/2023    Procedure: Cardiac pci;  Surgeon: Brown Beavers DO;  Location: [facility];   Service: Cardiology   • CARDIAC CATHETERIZATION N/A 4/25/2023    Procedure: Cardiac Coronary Angiogram;  Surgeon: Brown Beavers DO;  Location: [facility];   Service: Cardiology   • CATARACT EXTRACTION Left 12/2017   • CORONARY ARTERY BYPASS GRAFT  01/2006    x4 (LIMA-LAD, sequential Y-graft R [id number] & SVG-rPDA with distal RCA endarterectomy)   • HERNIA REPAIR  11/2020   • TONSILLECTOMY         Current Outpatient Medications:   •  acetaminophen (TYLENOL) 325 mg tablet, Take 650 mg by mouth every 6 (six) hours as needed, Disp: , Rfl:   •  aspirin 81 mg chewable tablet, Chew 81 mg daily, Disp: , Rfl:   •  azelastine (ASTELIN) 0 1 % nasal spray, 1-2 sprays into each nostril 2 (two) times a day, Disp: , Rfl:   •  clopidogrel (PLAVIX) 75 mg tablet, Take 1 tablet (75 mg total) by mouth daily Do not start before April 27, 2023 , Disp: 90 tablet, Rfl: 3  •  Coenzyme Q10 (COQ-10) 100 MG CAPS, Take by mouth, Disp: , Rfl:   •  diphenhydrAMINE (BENADRYL) 25 mg tablet, Take by mouth daily Takes at night to help him sleep , Disp: , Rfl:   •  ezetimibe (ZETIA) 10 mg tablet, TAKE 1 TABLET EVERY MORNING (Patient taking differently: Take 10 mg by mouth daily), Disp: 90 tablet, Rfl: 2  •  famotidine (PEPCID) 20 mg tablet, Take 1 tablet (20 mg total) by mouth daily, Disp: 90 tablet, Rfl: 3  •  metoprolol tartrate (LOPRESSOR) 25 mg tablet, Take 0 5 tablets (12 5 mg total) by mouth every 12 (twelve) hours, Disp: , Rfl: 0  •  NAPROXEN DR PO, Take by mouth Unknown strength takes prn for migraines, Disp: , Rfl:   •  Omega-3 Fatty Acids (FISH OIL) 1,000 mg, Take by mouth daily, Disp: , Rfl:   •  rosuvastatin (CRESTOR) 10 MG tablet, TAKE 1 TABLET (10 MG TOTAL) BY MOUTH 4 (FOUR) TIMES A WEEK, Disp: 48 tablet, Rfl: 0  No Known Allergies    Labs:  Admission on 04/25/2023, Discharged on 04/26/2023   Component Date Value   • Ventricular Rate 04/25/2023 50    • Atrial Rate 04/25/2023 50    • VA Interval 04/25/2023 174    • QRSD Interval 04/25/2023 88    • QT Interval 04/25/2023 452    • QTC Interval 04/25/2023 412    • P Axis 04/25/2023 25    • QRS Axis 04/25/2023 2    • T Wave Axis 04/25/2023 26    • Ventricular Rate 04/25/2023 48    • Atrial Rate 04/25/2023 48    • IL Interval 04/25/2023 180    • QRSD Interval 04/25/2023 92    • QT Interval 04/25/2023 462    • QTC Interval 04/25/2023 412    • P Axis 04/25/2023 37    • QRS Axis 04/25/2023 2    • T Wave Axis 04/25/2023 34    • Activated Clotting Time,* 04/25/2023 299 (H)    • Specimen Type 04/25/2023 VENOUS    • Sodium 04/26/2023 137    • Potassium 04/26/2023 3 8    • Chloride 04/26/2023 107    • CO2 04/26/2023 24    • ANION GAP 04/26/2023 6    • BUN 04/26/2023 15    • Creatinine 04/26/2023 0 91    • Glucose 04/26/2023 105    • Calcium 04/26/2023 8 7    • eGFR 04/26/2023 82    • WBC 04/26/2023 8 47    • RBC 04/26/2023 4 37    • Hemoglobin 04/26/2023 14 1    • Hematocrit 04/26/2023 41 0    • MCV 04/26/2023 94    • MCH 04/26/2023 32 3    • MCHC 04/26/2023 34 4    • RDW 04/26/2023 12 9    • Platelets 53/78/4776 195    • MPV 04/26/2023 11 0    Telephone on 04/13/2023   Component Date Value   • Sodium 04/21/2023 136    • Potassium 04/21/2023 4 2    • Chloride 04/21/2023 106    • CO2 04/21/2023 28    • ANION GAP 04/21/2023 2 (L)    • BUN 04/21/2023 17    • Creatinine 04/21/2023 1 02    • Glucose, Fasting 04/21/2023 113 (H)    • Calcium 04/21/2023 8 8    • AST 04/21/2023 24    • ALT 04/21/2023 38    • Alkaline Phosphatase 04/21/2023 73    • Total Protein 04/21/2023 7 2    • Albumin 04/21/2023 3 9    • Total Bilirubin 04/21/2023 0 55    • eGFR 04/21/2023 71    • WBC 04/21/2023 6 09    • RBC 04/21/2023 4 58    • Hemoglobin 04/21/2023 14 8    • Hematocrit 04/21/2023 42 9    • MCV 04/21/2023 94    • MCH 04/21/2023 32 3    • MCHC 04/21/2023 34 5    • RDW 04/21/2023 12 6    • MPV 04/21/2023 11 2    • Platelets 44/97/3446 209    • nRBC 04/21/2023 0    • Neutrophils Relative 04/21/2023 40 (L)    • Immat GRANS % 04/21/2023 0    • Lymphocytes Relative 04/21/2023 39    • Monocytes Relative 04/21/2023 11    • Eosinophils Relative 04/21/2023 9 (H)    • Basophils Relative 04/21/2023 1    • Neutrophils Absolute 04/21/2023 2 49    • Immature Grans Absolute 04/21/2023 0 02    • Lymphocytes Absolute 04/21/2023 2 35    • Monocytes Absolute 04/21/2023 0 64    • Eosinophils Absolute 04/21/2023 0 52    • Basophils Absolute 04/21/2023 0 07    Hospital Outpatient Visit on 04/05/2023   Component Date Value   • Protocol Name 04/05/2023 BOB    • Time In Exercise Phase 04/05/2023 00:08:00    • MAX   SYSTOLIC BP 52/14/1040 651    • Max Diastolic Bp 56/19/3554 82    • Max Heart Rate 04/05/2023 142    • Max Predicted Heart Rate 04/05/2023 145    • Reason for Termination 04/05/2023 Maximal exercise (symptom limited)    • Test Indication 04/05/2023 chest pain with exertion, CAD as    • Target Hr Formular 04/05/2023 (220 - Age)*100%    • Chest Pain Statement 04/05/2023 none    Hospital Outpatient Visit on 04/05/2023   Component Date Value   • Baseline HR 04/05/2023 54    • Baseline BP 04/05/2023 120/78    • O2 sat rest 04/05/2023 97    • Stress peak HR 04/05/2023 137    • Post peak BP 04/05/2023 158    • Rate Pressure Product 04/05/2023 21,646 0    • O2 sat peak 04/05/2023 98    • Recovery HR 04/05/2023 83    • Recovery BP 04/05/2023 124/72    • O2 sat recovery 04/05/2023 99    • Max HR 04/05/2023 142    • Max HR Percent 04/05/2023 92    • Exercise duration (min) 04/05/2023 8    • Exercise duration (sec) 04/05/2023 0    • Estimated workload 04/05/2023 10 1    • Angina Index 04/05/2023 0    • Stress Stage Reached 04/05/2023 3 0    • Rest Nuclear Isotope Dose 04/05/2023 10 39    • Stress Nuclear Isotope D* 04/05/2023 30 90    • Stress/rest perfusion ra* 04/05/2023 0 88    • EF (%) 04/05/2023 Jass Deras 70  Outpatient Visit on 04/05/2023   Component Date Value   • A4C EF 04/05/2023 63    • LVIDd 04/05/2023 4 30    • LVIDS 04/05/2023 2 90    • IVSd 04/05/2023 1 00    • LVPWd 04/05/2023 1 00    • FS 04/05/2023 33    • MV E' Tissue Velocity Se* 04/05/2023 7    • E wave deceleration time 04/05/2023 216    • MV Peak E Chin[place] 04/05/2023 90    • MV Peak A [place] 04/05/2023 1    • RVID d 04/05/2023 3 6    • Tricuspid annular plane * 04/05/2023 1 90    • LA size 04/05/2023 4 8    • LA length (A2C) 04/05/2023 6 10    • RAA A4C 04/05/2023 14 4    • MV stenosis pressure 1/2* 04/05/2023 63    • MV valve area p 1/2 meth* 04/05/2023 3 49    • TR Peak Chinmay 04/05/2023 2 3    • Triscuspid Valve Regurgi* 04/05/2023 20 0    • Ao root 04/05/2023 3 10    • Asc Ao 04/05/2023 3 1    • Tricuspid valve peak reg* 04/05/2023 2 26    • Left ventricular stroke * 04/05/2023 50 00    • IVS 04/05/2023 1    • LEFT VENTRICLE SYSTOLIC * 92/49/4144 33    • LV DIASTOLIC VOLUME (MOD* 61/11/0860 83    • Left Atrium Area-systoli* 04/05/2023 16 6    • Left Atrium Area-systoli* 04/05/2023 19 2    • LVSV, 2D 04/05/2023 50    • LV EF 04/05/2023 60    • Est  RA pres 04/05/2023 3 0    • Right Ventricular Peak S* 04/05/2023 23 00      Imaging: Cardiac catheterization    Result Date: 4/25/2023  Narrative: •  Three of four grafts patent from index CABG 2006 (Patent SVG-dRCA; Patent Y graft (LIMA to LAD and off mid NGUYEN is a radial graft to first diagonal; per op report radial continued to OM branch but that limb is not seen and is occluded therefore the native circumflex lesions underwent PCI with MAYITO and believed to be culprit for patients angina) •  PCI native circumflex with MAYITO proximally and distally •  Native 3VD as described •  Mid Cx to Dist Cx lesion is 90% stenosed  •  Ost Cx to Mid Cx lesion is 80% stenosed  Review of Systems:  Review of Systems   All other systems reviewed and are negative  Physical Exam:  Physical Exam  Vitals reviewed  Constitutional:       Appearance: Normal appearance  Neck:      Vascular: No carotid bruit  Cardiovascular:      Rate and Rhythm: Regular rhythm  Bradycardia present  Pulses: Normal pulses  Heart sounds: Normal heart sounds     Pulmonary:      Effort: Pulmonary effort is normal  Breath sounds: Normal breath sounds  Abdominal:      General: Bowel sounds are normal       Palpations: Abdomen is soft  Musculoskeletal:         General: Normal range of motion  Cervical back: Normal range of motion and neck supple  Right lower leg: No edema  Left lower leg: No edema  Skin:     General: Skin is warm and dry  Capillary Refill: Capillary refill takes less than 2 seconds  Comments: Right femoral cath site appears healed   Neurological:      General: No focal deficit present  Mental Status: He is alert and oriented to person, place, and time  Psychiatric:         Mood and Affect: Mood normal          Behavior: Behavior normal          Discussion/Summary:  # 4/25/23 Sp PCI and Fritz [place] MAYITO placed across the Ost Cx to Mid Cx 80% stenosis, 0% residual stenosis  Mid Cx to distal Cx lesion PCI with Fritz [place] MAYITO placed across the 90% stenosis, 0% residual stenosis  Continue on ASA 81mg daily, Plavix 75mg daily, Zetia 10mg dailt, Crestor 10mg daily, Metoprolol tartrate 12 5mg Q12 hours  He has SL NTG and is aware of the proper use  Cardiac Rehabilitation ordered at discharge  Joanne Salazar will not attend cardiac rehabilitation due to limited hours at [facility] on Tuesday and Thursday only with the  last appointment at 2:30 pm   # Hypertension RUE sitting 100/60 denies lightheadedness or dizziness continue on Metoprolol tartrate 12 5mg Q12 hours      # Hyperlipidemia 3/09/23 , TG 93, HDL 47, LDL 62 continue on Crestor 10mg daily and Zetia 10mg daily, DASH diet

## 2023-05-09 ENCOUNTER — OFFICE VISIT (OUTPATIENT)
Dept: CARDIOLOGY CLINIC | Facility: CLINIC | Age: 73
End: 2023-05-09

## 2023-05-09 VITALS
DIASTOLIC BLOOD PRESSURE: 62 MMHG | BODY MASS INDEX: 27.1 KG/M2 | SYSTOLIC BLOOD PRESSURE: 96 MMHG | HEART RATE: 54 BPM | WEIGHT: 168.6 LBS | HEIGHT: 66 IN | OXYGEN SATURATION: 97 %

## 2023-05-09 DIAGNOSIS — E78.2 MIXED HYPERLIPIDEMIA: ICD-10-CM

## 2023-05-09 DIAGNOSIS — I10 HYPERTENSION, UNSPECIFIED TYPE: ICD-10-CM

## 2023-05-09 DIAGNOSIS — Z95.5 S/P DRUG ELUTING CORONARY STENT PLACEMENT: Primary | ICD-10-CM

## 2023-05-09 RX ORDER — DIPHENOXYLATE HYDROCHLORIDE AND ATROPINE SULFATE 2.5; .025 MG/1; MG/1
1 TABLET ORAL DAILY
COMMUNITY

## 2023-06-02 ENCOUNTER — APPOINTMENT (OUTPATIENT)
Dept: LAB | Facility: MEDICAL CENTER | Age: 73
End: 2023-06-02
Payer: MEDICARE

## 2023-06-02 DIAGNOSIS — N40.1 ENLARGED PROSTATE WITH URINARY OBSTRUCTION: ICD-10-CM

## 2023-06-02 DIAGNOSIS — N13.8 ENLARGED PROSTATE WITH URINARY OBSTRUCTION: ICD-10-CM

## 2023-06-02 LAB
BUN SERPL-MCNC: 19 MG/DL (ref 5–25)
CREAT SERPL-MCNC: 0.99 MG/DL (ref 0.6–1.3)
GFR SERPL CREATININE-BSD FRML MDRD: 74 ML/MIN/1.73SQ M
PSA SERPL-MCNC: 2.6 NG/ML (ref 0–4)

## 2023-06-02 PROCEDURE — G0103 PSA SCREENING: HCPCS

## 2023-06-02 PROCEDURE — 84520 ASSAY OF UREA NITROGEN: CPT

## 2023-06-02 PROCEDURE — 82565 ASSAY OF CREATININE: CPT

## 2023-06-02 PROCEDURE — 36415 COLL VENOUS BLD VENIPUNCTURE: CPT

## 2023-09-18 ENCOUNTER — OFFICE VISIT (OUTPATIENT)
Dept: CARDIOLOGY CLINIC | Facility: CLINIC | Age: 73
End: 2023-09-18
Payer: MEDICARE

## 2023-09-18 VITALS
DIASTOLIC BLOOD PRESSURE: 76 MMHG | OXYGEN SATURATION: 98 % | HEART RATE: 70 BPM | BODY MASS INDEX: 27.7 KG/M2 | SYSTOLIC BLOOD PRESSURE: 130 MMHG | WEIGHT: 169 LBS

## 2023-09-18 DIAGNOSIS — I10 PRIMARY HYPERTENSION: Chronic | ICD-10-CM

## 2023-09-18 DIAGNOSIS — E78.2 MIXED HYPERLIPIDEMIA: Chronic | ICD-10-CM

## 2023-09-18 DIAGNOSIS — I25.10 CORONARY ARTERY DISEASE INVOLVING NATIVE CORONARY ARTERY OF NATIVE HEART WITHOUT ANGINA PECTORIS: Primary | ICD-10-CM

## 2023-09-18 PROCEDURE — 99213 OFFICE O/P EST LOW 20 MIN: CPT | Performed by: INTERNAL MEDICINE

## 2023-09-18 RX ORDER — METOPROLOL SUCCINATE 25 MG/1
12.5 TABLET, EXTENDED RELEASE ORAL DAILY
COMMUNITY
Start: 2023-08-18

## 2023-09-18 NOTE — LETTER
September 18, 2023     Rosanne Sibley MD  [address] [facility] [address]    Patient: Mihaela Nathan   YOB: 1950   Date of Visit: 9/18/2023       Dear Dr. Laquita Cuadra: Thank you for referring [place] Monte to me for evaluation. Below are my notes for this consultation. If you have questions, please do not hesitate to call me. I look forward to following your patient along with you. Sincerely,        Killian Blanco MD        CC: No Recipients    Killian Blanco MD  9/18/2023  8:39 AM  Sign when Signing Visit  Assessment/Plan:    Coronary artery disease involving native coronary artery  Coronary artery disease, stable. The patient is status post coronary bypass surgery x4 and more recently in April 2023 his stent was deployed in the proximal circumflex coronary artery. The patient is currently asymptomatic. Hyperlipidemia  Hyperlipidemia, stable. We will continue present regimen. Diagnoses and all orders for this visit:    Coronary artery disease involving native coronary artery of native heart without angina pectoris    Primary hypertension    Mixed hyperlipidemia    Other orders  -     metoprolol succinate (TOPROL-XL) 25 mg 24 hr tablet; Take 12.5 mg by mouth daily         Subjective: Feels well. Patient ID: Daphine Dakins is a 74 y.o. male. The patient presented to this office for the purpose of cardiac follow-up. He is known to have a history of coronary artery disease status post coronary bypass surgery. He is also known to have a history of hypertension and hyperlipidemia. In April 2023 the patient underwent coronary angioplasty on the circumflex system. Since that time the patient has been asymptomatic. He denies any symptoms of chest pain, shortness of breath, palpitation, dizziness or lightheadedness. He has no leg edema.       The following portions of the patient's history were reviewed and updated as appropriate: allergies, current medications, past family history, past medical history, past social history, past surgical history and problem list.    Review of Systems   Respiratory: Negative for apnea, cough, chest tightness, shortness of breath and wheezing. Cardiovascular: Negative for chest pain, palpitations and leg swelling. Gastrointestinal: Negative for abdominal pain. Neurological: Negative for dizziness and light-headedness. Psychiatric/Behavioral: Negative. Objective: Stable cardiac wise. /76 (BP Location: Left arm, Patient Position: Sitting, Cuff Size: Standard)   Pulse 70   Wt 76.7 kg (169 lb)   SpO2 98%   BMI 27.70 kg/m²         Physical Exam  Vitals reviewed. Constitutional:       General: He is not in acute distress. Appearance: He is well-developed. He is not diaphoretic. HENT:      Head: Normocephalic. Eyes:      Pupils: Pupils are equal, round, and reactive to light. Neck:      Thyroid: No thyromegaly. Vascular: No JVD. Cardiovascular:      Rate and Rhythm: Normal rate and regular rhythm. Heart sounds: S1 normal and S2 normal. No murmur heard. No friction rub. No gallop. Pulmonary:      Effort: Pulmonary effort is normal. No respiratory distress. Breath sounds: Normal breath sounds. No wheezing or rales. Chest:      Chest wall: No tenderness. Abdominal:      Palpations: Abdomen is soft. Musculoskeletal:         General: No tenderness or deformity. Normal range of motion. Cervical back: Normal range of motion. Right lower leg: No edema. Left lower leg: No edema. Skin:     General: Skin is warm and dry. Neurological:      Mental Status: He is alert and oriented to person, place, and time.    Psychiatric:         Mood and Affect: Mood normal.         Behavior: Behavior normal.

## 2023-09-18 NOTE — ASSESSMENT & PLAN NOTE
Coronary artery disease, stable. The patient is status post coronary bypass surgery x4 and more recently in April 2023 his stent was deployed in the proximal circumflex coronary artery. The patient is currently asymptomatic.

## 2023-09-18 NOTE — PROGRESS NOTES
Assessment/Plan:    Coronary artery disease involving native coronary artery  Coronary artery disease, stable. The patient is status post coronary bypass surgery x4 and more recently in April 2023 his stent was deployed in the proximal circumflex coronary artery. The patient is currently asymptomatic. Hyperlipidemia  Hyperlipidemia, stable. We will continue present regimen. Diagnoses and all orders for this visit:    Coronary artery disease involving native coronary artery of native heart without angina pectoris    Primary hypertension    Mixed hyperlipidemia    Other orders  -     metoprolol succinate (TOPROL-XL) 25 mg 24 hr tablet; Take 12.5 mg by mouth daily          Subjective: Feels well. Patient ID: Jamshid Zarate is a 74 y.o. male. The patient presented to this office for the purpose of cardiac follow-up. He is known to have a history of coronary artery disease status post coronary bypass surgery. He is also known to have a history of hypertension and hyperlipidemia. In April 2023 the patient underwent coronary angioplasty on the circumflex system. Since that time the patient has been asymptomatic. He denies any symptoms of chest pain, shortness of breath, palpitation, dizziness or lightheadedness. He has no leg edema. The following portions of the patient's history were reviewed and updated as appropriate: allergies, current medications, past family history, past medical history, past social history, past surgical history and problem list.    Review of Systems   Respiratory: Negative for apnea, cough, chest tightness, shortness of breath and wheezing. Cardiovascular: Negative for chest pain, palpitations and leg swelling. Gastrointestinal: Negative for abdominal pain. Neurological: Negative for dizziness and light-headedness. Psychiatric/Behavioral: Negative. Objective: Stable cardiac wise.       /76 (BP Location: Left arm, Patient Position: Sitting, Cuff Size: Standard)   Pulse 70   Wt 76.7 kg (169 lb)   SpO2 98%   BMI 27.70 kg/m²          Physical Exam  Vitals reviewed. Constitutional:       General: He is not in acute distress. Appearance: He is well-developed. He is not diaphoretic. HENT:      Head: Normocephalic. Eyes:      Pupils: Pupils are equal, round, and reactive to light. Neck:      Thyroid: No thyromegaly. Vascular: No JVD. Cardiovascular:      Rate and Rhythm: Normal rate and regular rhythm. Heart sounds: S1 normal and S2 normal. No murmur heard. No friction rub. No gallop. Pulmonary:      Effort: Pulmonary effort is normal. No respiratory distress. Breath sounds: Normal breath sounds. No wheezing or rales. Chest:      Chest wall: No tenderness. Abdominal:      Palpations: Abdomen is soft. Musculoskeletal:         General: No tenderness or deformity. Normal range of motion. Cervical back: Normal range of motion. Right lower leg: No edema. Left lower leg: No edema. Skin:     General: Skin is warm and dry. Neurological:      Mental Status: He is alert and oriented to person, place, and time.    Psychiatric:         Mood and Affect: Mood normal.         Behavior: Behavior normal.

## 2023-10-09 ENCOUNTER — TELEPHONE (OUTPATIENT)
Dept: INTERNAL MEDICINE CLINIC | Facility: CLINIC | Age: 73
End: 2023-10-09

## 2023-10-09 NOTE — TELEPHONE ENCOUNTER
10/9    Please remove this patient from [facility] panel. He is now seeing yaneli fritz. Thank you.

## 2024-03-18 ENCOUNTER — OFFICE VISIT (OUTPATIENT)
Dept: CARDIOLOGY CLINIC | Facility: CLINIC | Age: 74
End: 2024-03-18
Payer: MEDICARE

## 2024-03-18 VITALS
SYSTOLIC BLOOD PRESSURE: 110 MMHG | WEIGHT: 170 LBS | HEIGHT: 66 IN | OXYGEN SATURATION: 97 % | BODY MASS INDEX: 27.32 KG/M2 | HEART RATE: 65 BPM | DIASTOLIC BLOOD PRESSURE: 68 MMHG

## 2024-03-18 DIAGNOSIS — E78.2 MIXED HYPERLIPIDEMIA: Chronic | ICD-10-CM

## 2024-03-18 DIAGNOSIS — I25.10 CORONARY ARTERY DISEASE INVOLVING NATIVE CORONARY ARTERY OF NATIVE HEART WITHOUT ANGINA PECTORIS: Primary | ICD-10-CM

## 2024-03-18 DIAGNOSIS — I10 PRIMARY HYPERTENSION: Chronic | ICD-10-CM

## 2024-03-18 PROCEDURE — 99213 OFFICE O/P EST LOW 20 MIN: CPT | Performed by: INTERNAL MEDICINE

## 2024-03-18 NOTE — ASSESSMENT & PLAN NOTE
Coronary artery disease status post coronary bypass surgery and coronary intervention.  The patient is asymptomatic.  No symptoms of angina or signs of heart failure.

## 2024-03-18 NOTE — PROGRESS NOTES
"Assessment/Plan:    Hypertension  Hypertension, stable and adequately controlled.    Coronary artery disease involving native coronary artery  Coronary artery disease status post coronary bypass surgery and coronary intervention.  The patient is asymptomatic.  No symptoms of angina or signs of heart failure.    Hyperlipidemia  Hyperlipidemia, stable.       Diagnoses and all orders for this visit:    Coronary artery disease involving native coronary artery of native heart without angina pectoris    Primary hypertension    Mixed hyperlipidemia          Subjective: Feels well.     Patient ID: Flynn Nathan is a 73 y.o. male.    The patient presented to this office for the purpose of cardiac follow-up.  He is known to have a history of coronary artery disease status post coronary bypass surgery and coronary intervention.  The patient also has a history of hypertension and hyperlipidemia.  The patient has been feeling well.  He denies any symptoms of chest pain, shortness of breath, palpitation, dizziness or lightheadedness.  He has no leg edema.        The following portions of the patient's history were reviewed and updated as appropriate: allergies, current medications, past family history, past medical history, past social history, past surgical history, and problem list.    Review of Systems   Respiratory:  Negative for apnea, cough, chest tightness, shortness of breath and wheezing.    Cardiovascular:  Negative for chest pain, palpitations and leg swelling.   Gastrointestinal:  Negative for abdominal pain.   Neurological:  Negative for dizziness and light-headedness.         Objective: Stable cardiac wise.      /68 (BP Location: Left arm, Patient Position: Sitting, Cuff Size: Standard)   Pulse 65   Ht 5' 5.5\" (1.664 m)   Wt 77.1 kg (170 lb)   SpO2 97%   BMI 27.86 kg/m²          Physical Exam  Vitals reviewed.   Constitutional:       General: He is not in acute distress.     Appearance: He is " well-developed. He is not diaphoretic.   HENT:      Head: Normocephalic.   Eyes:      Pupils: Pupils are equal, round, and reactive to light.   Neck:      Thyroid: No thyromegaly.      Vascular: No JVD.   Cardiovascular:      Rate and Rhythm: Normal rate and regular rhythm.      Heart sounds: S1 normal and S2 normal. No murmur heard.     No friction rub. No gallop.   Pulmonary:      Effort: Pulmonary effort is normal. No respiratory distress.      Breath sounds: Normal breath sounds. No wheezing or rales.   Chest:      Chest wall: No tenderness.   Abdominal:      Palpations: Abdomen is soft.   Musculoskeletal:         General: No tenderness or deformity. Normal range of motion.      Cervical back: Normal range of motion.      Right lower leg: No edema.      Left lower leg: No edema.   Skin:     General: Skin is warm and dry.   Neurological:      Mental Status: He is alert and oriented to person, place, and time.   Psychiatric:         Mood and Affect: Mood normal.         Behavior: Behavior normal.

## 2024-06-24 ENCOUNTER — TELEPHONE (OUTPATIENT)
Age: 74
End: 2024-06-24

## 2024-06-24 NOTE — TELEPHONE ENCOUNTER
I advised the patient that he may discontinue Plavix but to make sure that he is taking the aspirin.

## 2024-06-24 NOTE — TELEPHONE ENCOUNTER
Received call from pt stating when he was in the see Dr. Arriola in 3/2024, he stated he remembers him saying that pt could either stop the metoprolol or the plavix but pt doesn't remember which one he said. Pt stated he is not having any issues on either of them but stated the less medication he takes the better. He stated his BP's have been on average around 110/65. The highest he usually runs is 120/72 and sometimes it can be 108/60.     Advised pt will send a message to Dr. Arriola to review and advise.

## 2024-07-01 ENCOUNTER — APPOINTMENT (OUTPATIENT)
Dept: LAB | Facility: MEDICAL CENTER | Age: 74
End: 2024-07-01
Payer: MEDICARE

## 2024-07-01 DIAGNOSIS — R97.20 ELEVATED PSA: ICD-10-CM

## 2024-07-01 LAB
PSA FREE MFR SERPL: 24.97 %
PSA FREE SERPL-MCNC: 1.89 NG/ML
PSA SERPL-MCNC: 7.56 NG/ML (ref 0–4)

## 2024-07-01 PROCEDURE — 36415 COLL VENOUS BLD VENIPUNCTURE: CPT

## 2024-07-01 PROCEDURE — 84153 ASSAY OF PSA TOTAL: CPT

## 2024-07-01 PROCEDURE — 84154 ASSAY OF PSA FREE: CPT

## 2024-08-01 ENCOUNTER — APPOINTMENT (OUTPATIENT)
Dept: LAB | Facility: MEDICAL CENTER | Age: 74
End: 2024-08-01
Payer: MEDICARE

## 2024-08-01 DIAGNOSIS — R97.20 ELEVATED PROSTATE SPECIFIC ANTIGEN (PSA): ICD-10-CM

## 2024-08-01 LAB
PSA FREE MFR SERPL: 13.08 %
PSA FREE SERPL-MCNC: 0.56 NG/ML
PSA SERPL-MCNC: 4.3 NG/ML (ref 0–4)

## 2024-08-01 PROCEDURE — 84154 ASSAY OF PSA FREE: CPT

## 2024-08-01 PROCEDURE — 36415 COLL VENOUS BLD VENIPUNCTURE: CPT

## 2024-08-01 PROCEDURE — 84153 ASSAY OF PSA TOTAL: CPT

## 2024-09-16 ENCOUNTER — OFFICE VISIT (OUTPATIENT)
Dept: CARDIOLOGY CLINIC | Facility: CLINIC | Age: 74
End: 2024-09-16
Payer: MEDICARE

## 2024-09-16 VITALS
HEART RATE: 64 BPM | BODY MASS INDEX: 26.84 KG/M2 | DIASTOLIC BLOOD PRESSURE: 60 MMHG | SYSTOLIC BLOOD PRESSURE: 124 MMHG | HEIGHT: 66 IN | WEIGHT: 167 LBS | OXYGEN SATURATION: 100 %

## 2024-09-16 DIAGNOSIS — I10 PRIMARY HYPERTENSION: Chronic | ICD-10-CM

## 2024-09-16 DIAGNOSIS — E78.2 MIXED HYPERLIPIDEMIA: Chronic | ICD-10-CM

## 2024-09-16 DIAGNOSIS — I25.10 ATHEROSCLEROSIS OF NATIVE CORONARY ARTERY OF NATIVE HEART WITHOUT ANGINA PECTORIS: Primary | Chronic | ICD-10-CM

## 2024-09-16 PROCEDURE — 99213 OFFICE O/P EST LOW 20 MIN: CPT | Performed by: INTERNAL MEDICINE

## 2024-09-16 RX ORDER — CIPROFLOXACIN 500 MG/1
500 TABLET, FILM COATED ORAL 2 TIMES DAILY
COMMUNITY
Start: 2024-07-02

## 2024-09-16 NOTE — ASSESSMENT & PLAN NOTE
Coronary artery disease, stable.  No symptoms of angina or signs of heart failure.  The patient's last intervention with a stent was over a year and a half ago.  The patient is experiencing some excessive bruising.  I am therefore asking the patient to discontinue Plavix.

## 2024-09-16 NOTE — PROGRESS NOTES
Subjective:        Patient ID: Flynn Nathan is a 74 y.o. adult.    Chief Complaint:  The patient presented to this office for the purpose of cardiac follow-up.  He is known to have a history of coronary artery disease status post coronary bypass surgery and more recently PTCA with stent deployment.  The patient has been feeling well.  He denies any symptom of chest pain or shortness of breath.  He denies any symptoms of palpitation, dizziness or syncope.  He has no leg edema.      The following portions of the patient's history were reviewed and updated as appropriate: allergies, current medications, past family history, past medical history, past social history, past surgical history, and problem list.  Review of Systems   Constitutional: Negative.   Cardiovascular: Negative.    Respiratory: Negative.     Psychiatric/Behavioral: Negative.            Objective:     Physical Exam  Constitutional:       Appearance: Normal appearance.   HENT:      Head: Normocephalic and atraumatic.   Cardiovascular:      Rate and Rhythm: Normal rate and regular rhythm.      Heart sounds: Normal heart sounds.   Pulmonary:      Effort: Pulmonary effort is normal.      Breath sounds: Normal breath sounds.   Musculoskeletal:      Right lower leg: No edema.      Left lower leg: No edema.   Skin:     General: Skin is warm and dry.   Neurological:      Mental Status: He is alert and oriented to person, place, and time.   Psychiatric:         Mood and Affect: Mood normal.         Behavior: Behavior normal.         Lab Review:   Lab Results   Component Value Date     07/08/2015    K 4.1 04/22/2024     04/22/2024    CO2 25 04/22/2024    BUN 28 04/22/2024    CREATININE 0.98 04/22/2024    GLUCOSE 106 07/08/2015    CALCIUM 9.2 04/22/2024         Assessment:       1. Atherosclerosis of native coronary artery of native heart without angina pectoris        2. Primary hypertension        3. Mixed hyperlipidemia             Plan:        The patient is advised to discontinue clopidogrel.  He will continue other medications.

## 2024-11-05 ENCOUNTER — APPOINTMENT (OUTPATIENT)
Dept: LAB | Facility: MEDICAL CENTER | Age: 74
End: 2024-11-05
Payer: MEDICARE

## 2024-11-05 DIAGNOSIS — N40.1 ENLARGED PROSTATE WITH URINARY OBSTRUCTION: ICD-10-CM

## 2024-11-05 DIAGNOSIS — N13.8 ENLARGED PROSTATE WITH URINARY OBSTRUCTION: ICD-10-CM

## 2024-11-05 DIAGNOSIS — R97.20 ELEVATED PROSTATE SPECIFIC ANTIGEN (PSA): ICD-10-CM

## 2024-11-05 LAB
BUN SERPL-MCNC: 27 MG/DL (ref 5–25)
CREAT SERPL-MCNC: 0.93 MG/DL (ref 0.6–1.3)
PSA SERPL-MCNC: 5.86 NG/ML (ref 0–4)

## 2024-11-05 PROCEDURE — 84520 ASSAY OF UREA NITROGEN: CPT

## 2024-11-05 PROCEDURE — 36415 COLL VENOUS BLD VENIPUNCTURE: CPT

## 2024-11-05 PROCEDURE — 84153 ASSAY OF PSA TOTAL: CPT

## 2024-11-05 PROCEDURE — 82565 ASSAY OF CREATININE: CPT

## 2025-02-27 ENCOUNTER — APPOINTMENT (OUTPATIENT)
Dept: LAB | Facility: MEDICAL CENTER | Age: 75
End: 2025-02-27
Payer: MEDICARE

## 2025-02-27 DIAGNOSIS — R97.20 ELEVATED PROSTATE SPECIFIC ANTIGEN (PSA): ICD-10-CM

## 2025-02-27 LAB
BUN SERPL-MCNC: 21 MG/DL (ref 5–25)
CREAT SERPL-MCNC: 0.91 MG/DL (ref 0.6–1.3)
GFR SERPL CREATININE-BSD FRML MDRD: 81 ML/MIN/1.73SQ M
PSA FREE MFR SERPL: 14.23 %
PSA FREE SERPL-MCNC: 0.8 NG/ML
PSA SERPL-MCNC: 5.63 NG/ML (ref 0–4)

## 2025-02-27 PROCEDURE — 84154 ASSAY OF PSA FREE: CPT

## 2025-02-27 PROCEDURE — 84153 ASSAY OF PSA TOTAL: CPT

## 2025-02-27 PROCEDURE — 82565 ASSAY OF CREATININE: CPT

## 2025-02-27 PROCEDURE — 84520 ASSAY OF UREA NITROGEN: CPT

## 2025-02-27 PROCEDURE — 36415 COLL VENOUS BLD VENIPUNCTURE: CPT

## 2025-03-10 ENCOUNTER — OFFICE VISIT (OUTPATIENT)
Dept: CARDIOLOGY CLINIC | Facility: CLINIC | Age: 75
End: 2025-03-10
Payer: MEDICARE

## 2025-03-10 VITALS
SYSTOLIC BLOOD PRESSURE: 114 MMHG | BODY MASS INDEX: 27.19 KG/M2 | DIASTOLIC BLOOD PRESSURE: 70 MMHG | HEIGHT: 66 IN | WEIGHT: 169.2 LBS | HEART RATE: 66 BPM | OXYGEN SATURATION: 99 %

## 2025-03-10 DIAGNOSIS — E78.2 MIXED HYPERLIPIDEMIA: Chronic | ICD-10-CM

## 2025-03-10 DIAGNOSIS — I10 PRIMARY HYPERTENSION: Chronic | ICD-10-CM

## 2025-03-10 DIAGNOSIS — I25.10 ATHEROSCLEROSIS OF NATIVE CORONARY ARTERY OF NATIVE HEART WITHOUT ANGINA PECTORIS: Primary | Chronic | ICD-10-CM

## 2025-03-10 PROCEDURE — 99214 OFFICE O/P EST MOD 30 MIN: CPT | Performed by: INTERNAL MEDICINE

## 2025-03-10 RX ORDER — LINACLOTIDE 72 UG/1
72 CAPSULE, GELATIN COATED ORAL DAILY
COMMUNITY
Start: 2025-03-05

## 2025-03-10 NOTE — ASSESSMENT & PLAN NOTE
Coronary artery disease, stable.  No symptoms of angina or signs of heart failure.  We will continue present regimen.

## 2025-03-10 NOTE — PROGRESS NOTES
"Name: Flynn Nathan      : 1950      MRN: [id number]  Encounter Provider: Radha Arriola MD  Encounter Date: 3/10/2025   Encounter department: [facility] ARRIOLA  :  Assessment & Plan  Atherosclerosis of native coronary artery of native heart without angina pectoris  Coronary artery disease, stable.  No symptoms of angina or signs of heart failure.  We will continue present regimen.       Mixed hyperlipidemia  Hyperlipidemia, stable.  The patient will continue rosuvastatin.       Primary hypertension  Hypertension, stable and adequately controlled.           History of Present Illness   The patient presented to this office for the purpose of cardiac follow-up.  She of coronary artery disease with coronary bypass surgery and more recently angioplasty with stent deployed in the circumflex system.  This occurred about 2 years ago.  The patient is feeling well from the cardiac standpoint denying any symptom of chest pain or shortness of breath.  He denies any symptoms of palpitation, dizziness or syncope.  He has no leg edema.      Flynn Nathan is a 74 y.o. male   History obtained from: patient    Review of Systems   Constitutional: Negative.    Respiratory: Negative.     Cardiovascular: Negative.    Psychiatric/Behavioral: Negative.            Objective   /70 (BP Location: Left arm, Patient Position: Sitting, Cuff Size: Standard)   Pulse 66   Ht 5' 5.5\" (1.664 m)   Wt 76.7 kg (169 lb 3.2 oz)   SpO2 99%   BMI 27.73 kg/m²      Physical Exam  Vitals reviewed.   Constitutional:       Appearance: Normal appearance.   HENT:      Head: Normocephalic and atraumatic.   Cardiovascular:      Rate and Rhythm: Normal rate and regular rhythm.      Heart sounds: Normal heart sounds.   Pulmonary:      Effort: Pulmonary effort is normal.      Breath sounds: Normal breath sounds.   Musculoskeletal:      Right lower leg: No edema.      Left lower leg: No edema.   Skin:     " General: Skin is warm and dry.   Neurological:      Mental Status: He is alert and oriented to person, place, and time.   Psychiatric:         Mood and Affect: Mood normal.         Behavior: Behavior normal.

## 2025-03-12 ENCOUNTER — OFFICE VISIT (OUTPATIENT)
Dept: OBGYN CLINIC | Facility: HOSPITAL | Age: 75
End: 2025-03-12
Payer: MEDICARE

## 2025-03-12 VITALS — HEIGHT: 66 IN | WEIGHT: 169 LBS | BODY MASS INDEX: 27.16 KG/M2

## 2025-03-12 DIAGNOSIS — M72.0 DUPUYTREN'S DISEASE: Primary | ICD-10-CM

## 2025-03-12 PROCEDURE — 99214 OFFICE O/P EST MOD 30 MIN: CPT | Performed by: ORTHOPAEDIC SURGERY

## 2025-03-12 RX ORDER — CEFAZOLIN SODIUM 2 G/50ML
2000 SOLUTION INTRAVENOUS ONCE
OUTPATIENT
Start: 2025-03-12 | End: 2025-03-12

## 2025-03-12 NOTE — PROGRESS NOTES
ORTHOPAEDIC HAND, WRIST, AND ELBOW OFFICE  VISIT     Name: Flynn Nathan      : 1950      MRN: [id number]  Encounter Provider: Raza Eugene MD  Encounter Date: 3/12/2025   Encounter department: [facility] [place]  :  Assessment & Plan  Dupuytren's disease  Discussed with the patient that he is a candidate for xiaflex  - the different options to treat dupuytren's including, Xiaflex injections, surgical intervention with cord removal, and digit widgit placement followed with Xiaflex  -Discussed that the positive to surgical intervention is that we could fix all the fingers at 1 time, with splinting following surgery for 3 to 4 days followed by physical therapy for range of motion, however the downfall is that typically extension is painful and patients tend to flex the fingers and lose endrange of extension  -Discussed a staged procedure with a digit widgit, with application of the digit widget in the OR with changing of bands daily for 4-5 weeks(or however long it takes for full extension), for flexion and skin growth, could continue with xiaflex or surgical intervention at that time  -Recommendation for digit widgit for small finger due to having multiple cords and severe flexion contracture  - Patient elected to proceed with digit widgit for the small finger and xiaflex following, with xiaflex of ring and long finger approximately 1 month after  - The patient expresses understanding and is in agreement with today's treatment plan.         Orders:  •  Case request operating room: Left small finger application digit widget; Standing  •  Injection Procedure Prior Authorization; Future          General Discussions:     Trigger FInger: The anatomy and physiology of trigger finger was discussed with the patient today in the office.  Edema and increased contact pressure within the flexor tendons at the A1 pulley can cause pain, crepitation, and limitation of function.   "Treatment options include resting MP blocking splints, PIP blocking splints, or \"band-aid\" splints to decrease edema, oral anti-inflammatory medications, home or formal therapy exercises, up to 2 steroid injections, or surgical release.  While a majority of patients do respond to conservative treatment, up to 20% may require surgical release.     Operative Discussions:      History of Present Illness   HPI  Chief Complaint   Patient presents with   • Left Hand - New Patient Visit     Dupuytrens  Long TF       Flynn Nathan is a 74 y.o. male who presents with Dupuytren's disease of left long, ring, and small fingers, with the small finger being the worst.  He would like to discuss Xiaflex injections at this time.  He states that he has difficulty with putting on his golf gloves as well as that he is not able to put his hand flat on the table.  He had Xiaflex procedure done on his right ring finger that he states has done very well.      REVIEW OF SYSTEMS:  General: no fever, no chills  HEENT:  No loss of hearing or eyesight problems  Eyes:  No red eyes  Respiratory:  No coughing, shortness of breath or wheezing  Cardiovascular:  No chest pain, no palpitations  GI:  Abdomen soft nontender, denies nausea  Endocrine:  No muscle weakness, no frequent urination, no excessive thirst  Urinary:  No dysuria, no incontinence  Musculoskeletal: see HPI and PE  SKIN:  No skin rash, no dry skin  Neurological:  No headaches, no confusion  Psychiatric:  No suicide thoughts, no anxiety, no depression  Review of all other systems is negative    Objective   Ht 5' 5.5\" (1.664 m)   Wt 76.7 kg (169 lb)   BMI 27.70 kg/m²      General: well developed and well nourished, alert, oriented times 3, and appears comfortable  Psychiatric: Normal  HEENT: Trachea Midline, No torticollis  Cardiovascular: No discernable arrhythmia  Pulmonary: No wheezing or stridor  Abdomen: No rebound or guarding  Extremities: No peripheral edema  Skin: No " masses, erythema, lacerations, fluctation, ulcerations  Neurovascular: Sensation Intact to the Median, Ulnar, Radial Nerve, Motor Intact to the Median, Ulnar, Radial Nerve, and Pulses Intact    Musculoskeletal exam:  left Hand -    Patient presents with obvious anatomical deformity of left long, ring, and small fingers  Skin is warm and dry to touch with no signs of erythema, ecchymosis, or infection  No soft tissue swelling or effusion noted  No rotational deformity with composite finger flexion  TTP with palpable nodule in the long finger flexor tendon local to A1 Pulley  No Reproducible triggering on exam, however clicking and catching demonstrated in office  Demonstrates normal wrist, elbow, and shoulder motion  Forearm compartments are soft and supple  2+ distal radial pulse with brisk capillary refill to the fingers  Radial, median, and ulnar motor and sensory distribution intact  Sensations light to touch intact distally  Dupuytren's contractures-  Pre-central cord in line with left long finger with MP joint contracture of 60 degrees  Pre-central cord in line with left ring finger with MP joint contracture of 36 degrees  Pre-central and ulnar spiral non continuous in line with small finger MP of 58 degrees and to PIP of 78 degrees  Xiaflex Information:  Anticipated Initial injection date: ASAP          Number of vials 2    Diagnosis Code M72.0   yes  (yes or no)    Right hand: # of MP joints to treat 0   # of PIP joints to treat 0  Affected finger(s)  0 (R2, R3, R4, R5)    Left hand: # of MP joints to treat 3   # of PIP joints to treat 1  Affected finger(s)  L3, L4, L5    Contracture had a palpable cord yes  (yes/no)    Right hand: degree of contracture  0 MP     0 PIP    Left hand: degree of contracture  L3-60, L4-36, L5-58 MP     L5-78 PIP    Positive Tabletop test  yes (yes/no)    Refills: 2    STUDIES REVIEWED:  No Studies to review      PROCEDURES PERFORMED:  Procedures  No Procedures performed  today          Scribe Attestation    I,:  Annelise Valencia am acting as a scribe while in the presence of the attending physician.:       I,:  Raza Eugene MD personally performed the services described in this documentation    as scribed in my presence.:

## 2025-03-12 NOTE — PROGRESS NOTES
Left   Left trigger finger of long finger  Precentral mp contract to 60  Ring pre central cod to mp of 36  Small finger pre central and ulnar spiral non continuous mp of 58 and pip of 78    Discussed with the patient that he is a candidate for xiaflex    Will need second injection for small cord  80% long term with xiaflex, can treat 2 fingers at a time, have to wait 30 days to do next series  50% long term effects with xiaflex  Chance of skin tear with *** , with wound care for 1 week to heal  Surgical intervention - can fix all fingers at one time, splinting for 3-4 days with therapy for ROM - painful with extension so patients will tend to bend them  Discussed a staged procedure with a digit widgit - puts on in the OR with changing of bands daily for 4-5 weeks, for flexion and skin growth, could continue with xiaflex or surgical intervention at that time  If he is safe, he could continue to golf, he would like have to make sure that he is cleaning the pin sites  Recommendation for digit widgit for small finger    Patient elected to proceed with digit widgit for the small finger and xiaflex following, with xiaflex of ring and long finger    Order 2 sets xiaflex, once confirmed, place digit widgit, then one month later xiaflex *** 2 vials, 2 refills

## 2025-03-19 ENCOUNTER — TELEPHONE (OUTPATIENT)
Age: 75
End: 2025-03-19

## 2025-03-19 NOTE — TELEPHONE ENCOUNTER
New Patient   Insurance   Current Insurance? Medicare     Insurance E-verified? Yes    History   Reason for appointment/active symptoms?    Elevated PSA - Biopsy recommended by     Has the patient had any previous Urologist(s)?     Was the patient seen in the ED? No    Labs/Imaging(Including Out Of Network)?   PSA Labs 2/27/25, 11/5/24, 10/29/24    Records Requested?   Records Visible in EPIC? Yes    Personal history of cancer? Skin cancer    Appointment   Office location preference:     Appointment Details   Date: 4/11/2025   Time: 8:15am   Location: [place]   Provider:      Does the appointment need further review? No

## 2025-04-11 ENCOUNTER — OFFICE VISIT (OUTPATIENT)
Dept: UROLOGY | Facility: MEDICAL CENTER | Age: 75
End: 2025-04-11
Payer: MEDICARE

## 2025-04-11 ENCOUNTER — TELEPHONE (OUTPATIENT)
Dept: UROLOGY | Facility: MEDICAL CENTER | Age: 75
End: 2025-04-11

## 2025-04-11 VITALS
HEART RATE: 78 BPM | DIASTOLIC BLOOD PRESSURE: 60 MMHG | WEIGHT: 161 LBS | OXYGEN SATURATION: 98 % | HEIGHT: 66 IN | BODY MASS INDEX: 25.88 KG/M2 | SYSTOLIC BLOOD PRESSURE: 116 MMHG

## 2025-04-11 DIAGNOSIS — R97.20 ELEVATED PSA: Primary | ICD-10-CM

## 2025-04-11 DIAGNOSIS — N40.1 BENIGN PROSTATIC HYPERPLASIA WITH URINARY RETENTION: Chronic | ICD-10-CM

## 2025-04-11 DIAGNOSIS — R33.8 BENIGN PROSTATIC HYPERPLASIA WITH URINARY RETENTION: Chronic | ICD-10-CM

## 2025-04-11 LAB
SL AMB  POCT GLUCOSE, UA: NORMAL
SL AMB LEUKOCYTE ESTERASE,UA: NORMAL
SL AMB POCT BILIRUBIN,UA: NORMAL
SL AMB POCT BLOOD,UA: NORMAL
SL AMB POCT CLARITY,UA: CLEAR
SL AMB POCT COLOR,UA: YELLOW
SL AMB POCT KETONES,UA: NORMAL
SL AMB POCT NITRITE,UA: NORMAL
SL AMB POCT PH,UA: 6
SL AMB POCT SPECIFIC GRAVITY,UA: 1.01
SL AMB POCT URINE PROTEIN: NORMAL
SL AMB POCT UROBILINOGEN: 0.2

## 2025-04-11 PROCEDURE — 81003 URINALYSIS AUTO W/O SCOPE: CPT | Performed by: UROLOGY

## 2025-04-11 PROCEDURE — 99204 OFFICE O/P NEW MOD 45 MIN: CPT | Performed by: UROLOGY

## 2025-04-11 RX ORDER — NAPROXEN 500 MG/1
500 TABLET ORAL 2 TIMES DAILY PRN
COMMUNITY
Start: 2025-03-05

## 2025-04-11 NOTE — TELEPHONE ENCOUNTER
Patient seen today by Dr. Cunningham. MRI Prostate with sedation ordered.  Called central scheduling to schedule, spoke with Ava.  Procedure is only done at [facility] (formerly [facility]3].  Patient scheduled fo 6/25/25.  Patient made aware that hospital will call him the evening before to let him know what time to report.  Patient aware to be npo after midnight night before.  Patient will need a .  Ava also stated that patient will need an H&P done by his pcp 30 days prior to procedure.  Patient will arrange with pcp.

## 2025-04-11 NOTE — TELEPHONE ENCOUNTER
Pt called to request his MRI Prostate Order is faxed to [facility] at [phone]. Sent as requested.

## 2025-04-11 NOTE — ASSESSMENT & PLAN NOTE
Most recent psa was 5.627. Free psa is 14.2%.  Risk of prostate cancer based on the free to total PSA ratio was about 35%.  SESAR is palpably benign.  Options were discussed and I did recommend we proceed with multiparametric MRI of the prostate.  The patient has claustrophobia and we will see if this can be done with conscious sedation.  Orders:    MRI prostate multiparametric wo w contrast; Future

## 2025-04-11 NOTE — PROGRESS NOTES
Name: Flynn Nathan      : 1950      MRN: [id number]  Encounter Provider: Donny Cunningham MD  Encounter Date: 2025   Encounter department: [facility]  :  Assessment & Plan  Elevated PSA  Most recent psa was 5.627. Free psa is 14.2%.  Risk of prostate cancer based on the free to total PSA ratio was about 35%.  SESAR is palpably benign.  Options were discussed and I did recommend we proceed with multiparametric MRI of the prostate.  The patient has claustrophobia and we will see if this can be done with conscious sedation.  Orders:    MRI prostate multiparametric wo w contrast; Future    Benign prostatic hyperplasia with urinary retention  AUA symptom score is 5.  He is pleased with his voiding pattern.  We will follow with watchful waiting.           History of Present Illness   Flynn Nathan is a 75 y.o. male who presents for evaluation of elevated PSA.  His PSAs have been elevated but variable over the last few years.  He notes he voids with an adequate stream and feels he empties his bladder.  He gets up once at night to urinate.  There is no gross hematuria or symptoms of infection.  AUA SYMPTOM SCORE      Flowsheet Row Most Recent Value   AUA SYMPTOM SCORE    How often have you had a sensation of not emptying your bladder completely after you finished urinating? 1 (P)     How often have you had to urinate again less than two hours after you finished urinating? 0 (P)     How often have you found you stopped and started again several times when you urinate? 1 (P)     How often have you found it difficult to postpone urination? 1 (P)     How often have you had a weak urinary stream? 1 (P)     How often have you had to push or strain to begin urination? 0 (P)     How many times did you most typically get up to urinate from the time you went to bed at night until the time you got up in the morning? 1 (P)     Quality of Life: If you were to spend the rest of your  "life with your urinary condition just the way it is now, how would you feel about that? 1 (P)     AUA SYMPTOM SCORE 5 (P)            Review of Systems   Constitutional:  Negative for chills, diaphoresis, fatigue and fever.   HENT: Negative.     Eyes: Negative.    Respiratory: Negative.     Cardiovascular: Negative.    Endocrine: Negative.    Genitourinary:         See HPI   Musculoskeletal: Negative.    Skin: Negative.    Allergic/Immunologic: Negative.    Neurological: Negative.    Hematological: Negative.    Psychiatric/Behavioral: Negative.            Objective   /60 (BP Location: Left arm, Patient Position: Sitting, Cuff Size: Standard)   Pulse 78   Ht 5' 5.5\" (1.664 m)   Wt 73 kg (161 lb)   SpO2 98%   BMI 26.38 kg/m²     Physical Exam  Vitals reviewed.   Constitutional:       General: He is not in acute distress.     Appearance: Normal appearance. He is well-developed and normal weight. He is not ill-appearing, toxic-appearing or diaphoretic.   HENT:      Head: Normocephalic and atraumatic.   Eyes:      General: No scleral icterus.     Conjunctiva/sclera: Conjunctivae normal.   Cardiovascular:      Rate and Rhythm: Normal rate.   Pulmonary:      Effort: Pulmonary effort is normal.   Abdominal:      General: Bowel sounds are normal. There is no distension.      Palpations: Abdomen is soft. There is no mass.      Tenderness: There is no abdominal tenderness. There is no right CVA tenderness, left CVA tenderness, guarding or rebound.      Hernia: No hernia is present.   Genitourinary:     Penis: Normal. No phimosis or hypospadias.       Testes: Normal.         Right: Mass not present.         Left: Mass not present.      Rectum: Normal.      Comments: Prostate 1.5X enlarged and palpably benign  Musculoskeletal:         General: Normal range of motion.      Cervical back: Neck supple.   Skin:     General: Skin is warm and dry.   Neurological:      General: No focal deficit present.      Mental Status: He " is alert and oriented to person, place, and time.   Psychiatric:         Mood and Affect: Mood normal.         Behavior: Behavior normal.         Thought Content: Thought content normal.         Judgment: Judgment normal.           Results   Lab Results   Component Value Date    PSA 5.627 (H) 02/27/2025    PSA 5.858 (H) 11/05/2024    PSA 4.303 (H) 08/01/2024     Lab Results   Component Value Date    GLUCOSE 106 07/08/2015    CALCIUM 9.3 03/19/2025     07/08/2015    K 4.4 03/19/2025    CO2 28 03/19/2025     03/19/2025    BUN 17 03/19/2025    CREATININE 0.87 03/19/2025     Lab Results   Component Value Date    WBC 8.47 04/26/2023    HGB 14.1 04/26/2023    HCT 41.0 04/26/2023    MCV 94 04/26/2023     04/26/2023       Office Urine Dip  No results found for this or any previous visit (from the past hour).

## 2025-04-11 NOTE — ASSESSMENT & PLAN NOTE
AUA symptom score is 5.  He is pleased with his voiding pattern.  We will follow with watchful waiting.

## 2025-04-11 NOTE — LETTER
2025     Yuliana Kincaid MD  [address]  [address]  [place] [address]    Patient: Flynn Nathan   YOB: 1950   Date of Visit: 2025       Dear MD Antoine Phillips MD:    Thank you for referring Flynn Nathan to me for evaluation. Below are my notes for this consultation.    If you have questions, please do not hesitate to call me. I look forward to following your patient along with you.         Sincerely,        Donny Cunningham MD        CC: MD Donny Brush MD  2025  9:08 AM  Sign when Signing Visit  Name: Flynn Nathan      : 1950      MRN: [id number]  Encounter Provider: Donny Cunningham MD  Encounter Date: 2025   Encounter department: [facility] UROLOGY [place]  :  Assessment & Plan  Elevated PSA  Most recent psa was 5.627. Free psa is 14.2%.  Risk of prostate cancer based on the free to total PSA ratio was about 35%.  SESAR is palpably benign.  Options were discussed and I did recommend we proceed with multiparametric MRI of the prostate.  The patient has claustrophobia and we will see if this can be done with conscious sedation.  Orders:  •  MRI prostate multiparametric wo w contrast; Future    Benign prostatic hyperplasia with urinary retention  AUA symptom score is 5.  He is pleased with his voiding pattern.  We will follow with watchful waiting.           History of Present Illness  Flynn Nathan is a 75 y.o. male who presents for evaluation of elevated PSA.  His PSAs have been elevated but variable over the last few years.  He notes he voids with an adequate stream and feels he empties his bladder.  He gets up once at night to urinate.  There is no gross hematuria or symptoms of infection.  AUA SYMPTOM SCORE      Flowsheet Row Most Recent Value   AUA SYMPTOM SCORE    How often have you had a sensation of not emptying your bladder completely after you finished urinating? 1 (P)    "  How often have you had to urinate again less than two hours after you finished urinating? 0 (P)     How often have you found you stopped and started again several times when you urinate? 1 (P)     How often have you found it difficult to postpone urination? 1 (P)     How often have you had a weak urinary stream? 1 (P)     How often have you had to push or strain to begin urination? 0 (P)     How many times did you most typically get up to urinate from the time you went to bed at night until the time you got up in the morning? 1 (P)     Quality of Life: If you were to spend the rest of your life with your urinary condition just the way it is now, how would you feel about that? 1 (P)     AUA SYMPTOM SCORE 5 (P)            Review of Systems   Constitutional:  Negative for chills, diaphoresis, fatigue and fever.   HENT: Negative.     Eyes: Negative.    Respiratory: Negative.     Cardiovascular: Negative.    Endocrine: Negative.    Genitourinary:         See HPI   Musculoskeletal: Negative.    Skin: Negative.    Allergic/Immunologic: Negative.    Neurological: Negative.    Hematological: Negative.    Psychiatric/Behavioral: Negative.            Objective  /60 (BP Location: Left arm, Patient Position: Sitting, Cuff Size: Standard)   Pulse 78   Ht 5' 5.5\" (1.664 m)   Wt 73 kg (161 lb)   SpO2 98%   BMI 26.38 kg/m²     Physical Exam  Vitals reviewed.   Constitutional:       General: He is not in acute distress.     Appearance: Normal appearance. He is well-developed and normal weight. He is not ill-appearing, toxic-appearing or diaphoretic.   HENT:      Head: Normocephalic and atraumatic.   Eyes:      General: No scleral icterus.     Conjunctiva/sclera: Conjunctivae normal.   Cardiovascular:      Rate and Rhythm: Normal rate.   Pulmonary:      Effort: Pulmonary effort is normal.   Abdominal:      General: Bowel sounds are normal. There is no distension.      Palpations: Abdomen is soft. There is no mass.      " Tenderness: There is no abdominal tenderness. There is no right CVA tenderness, left CVA tenderness, guarding or rebound.      Hernia: No hernia is present.   Genitourinary:     Penis: Normal. No phimosis or hypospadias.       Testes: Normal.         Right: Mass not present.         Left: Mass not present.      Rectum: Normal.      Comments: Prostate 1.5X enlarged and palpably benign  Musculoskeletal:         General: Normal range of motion.      Cervical back: Neck supple.   Skin:     General: Skin is warm and dry.   Neurological:      General: No focal deficit present.      Mental Status: He is alert and oriented to person, place, and time.   Psychiatric:         Mood and Affect: Mood normal.         Behavior: Behavior normal.         Thought Content: Thought content normal.         Judgment: Judgment normal.           Results   Lab Results   Component Value Date    PSA 5.627 (H) 02/27/2025    PSA 5.858 (H) 11/05/2024    PSA 4.303 (H) 08/01/2024     Lab Results   Component Value Date    GLUCOSE 106 07/08/2015    CALCIUM 9.3 03/19/2025     07/08/2015    K 4.4 03/19/2025    CO2 28 03/19/2025     03/19/2025    BUN 17 03/19/2025    CREATININE 0.87 03/19/2025     Lab Results   Component Value Date    WBC 8.47 04/26/2023    HGB 14.1 04/26/2023    HCT 41.0 04/26/2023    MCV 94 04/26/2023     04/26/2023       Office Urine Dip  No results found for this or any previous visit (from the past hour).

## 2025-04-11 NOTE — PATIENT INSTRUCTIONS
"  Patient Education     Prostate cancer screening (PSA tests)   The Basics   Written by the doctors and editors at [facility]   What is prostate cancer screening? -- This is a way to check the prostate gland for signs of cancer. The prostate gland is part of the male anatomy. It sits below the bladder and in front of the rectum. It forms a ring around the urethra, the tube that carries urine out of the body (figure 1).  The main test used to screen for prostate cancer is a blood test called a \"PSA test.\"  Who should be screened for prostate cancer? -- Prostate cancer screening is done in people who have no symptoms of the disease. It is not clear whether getting screened for prostate cancer can extend life or prevent symptoms or problems. For this reason, doctors do not know who, if anyone, should be screened for prostate cancer.  Most experts recommend working with your doctor to decide whether screening is right for you. In most cases, this discussion should start around the age of 50. If you have any risk factors for prostate cancer, you might want to begin screening at age 40 to 45. Your risk is higher if you are Black, have a father or brother with prostate cancer, or have certain genetic mutations such as \"BRCA1\" or \"BRCA2.\"  Most doctors recommend against screening if you are age 70 or older, or have serious health problems.  Why do doctors offer screening? -- The goal is to catch cancer early, before it has a chance to grow, spread, or cause symptoms. With many cancers, catching the disease early is an important part of effective treatment. But prostate cancer is not like many other cancers. It usually grows slowly and does not lead to death. The problem is that a small number of prostate cancers are serious and can lead to death. Doctors do not have an ideal way to tell which prostate cancers are deadly and which ones would never cause any problems.  Certain tests can suggest which prostate cancers might be " "more likely to cause problems. But the tests are not perfect. Also, different studies have come to different conclusions about the benefits of screening and whether or not it lowers the risk of dying from prostate cancer.  What are the drawbacks to getting screened? -- PSA tests have 2 main drawbacks:   PSA tests sometimes give \"false positives.\" This means that they suggest cancer when there is actually no cancer. This can lead to unneeded worry and to further tests. One of these tests, a biopsy, can be briefly painful.   When PSA tests lead to the discovery of cancer, there is very often no way to tell whether the cancer is one that could do harm. That means that you could get treated for cancer that would never have done you any harm. That's a problem because treatment for prostate cancer has risks and often causes problems of its own. For instance, prostate cancer treatment can cause you to leak urine and to have problems with sex.  Should I get screened? -- Work with your doctor or nurse to decide if screening is right for you. This involves thinking about how likely it is that you will get prostate cancer. If you have a high risk of prostate cancer, screening might be a good idea.  You also need to think about how you feel about the possible benefits and harms of being screened. Ask yourself:   Do I want to know if I have prostate cancer, even if the cancer might never do me any harm?   Would I get treatment if I found out that I had prostate cancer?   How do I feel about the risks of being treated for prostate cancer?   How do I feel about the risks of getting a deadly or aggressive form of prostate cancer?   Would I be willing to accept a high risk of side effects from treatment in return for a small chance of living longer?  What is a PSA test? -- PSA stands for \"prostate-specific antigen.\" It is a protein made by the prostate. Levels of this protein usually go up when a person has prostate cancer. The " "protein also goes up for reasons that do not involve cancer. For example, PSA levels rise if you:   Have a condition called benign prostatic hyperplasia (\"BPH\"), sometimes called an enlarged prostate   Have a prostate infection, also called prostatitis   Hurt your prostate, for example while riding a bike   Ejaculate (have an orgasm)  What if my PSA level is too high? -- Try not to panic. It's possible that your PSA is high for reasons unrelated to cancer. If your PSA level is only somewhat high, usually the next step is to have the PSA test again. For 2 days before the second test, avoid ejaculating and riding a bike. If your doctor thinks that you have a prostate infection, you might also need to take antibiotics for a while before you repeat the test.  If your PSA is still high on the second test, or if it was very high the first time, you will probably need more testing. This might include a biopsy or other test. A biopsy means that a doctor will insert a needle into your prostate to take tiny samples of tissue. Those samples will then go to the lab to be checked for cancer.  If you do have cancer, remember that prostate cancer is not usually deadly. It usually grows slowly, so you probably have time to decide what to do. There are treatments that can sometimes cure prostate cancer. You might also choose to hold off on treatment and wait to see if your cancer shows signs of progressing.  How often should I be screened for prostate cancer? -- If you do decide to be screened, experts recommend repeating screening every 2 years.  Doctors recommend stopping screening when you turn 70 or if you develop serious health problems. In these cases, the benefits of screening are not worth the possible harms.  All topics are updated as new evidence becomes available and our peer review process is complete.  This topic retrieved from [employer] on: Mar 29, 2024.  Topic 46813 Version 16.0  Release: 32.2.4 - C32.87  © 2024 " UpToDate, Inc. and/or its affiliates. All rights reserved.  figure 1: Prostate gland     This drawing shows male internal organs and a close-up of the prostate gland.  Graphic 46114 Version 5.0  Consumer Information Use and Disclaimer   Disclaimer: This generalized information is a limited summary of diagnosis, treatment, and/or medication information. It is not meant to be comprehensive and should be used as a tool to help the user understand and/or assess potential diagnostic and treatment options. It does NOT include all information about conditions, treatments, medications, side effects, or risks that may apply to a specific patient. It is not intended to be medical advice or a substitute for the medical advice, diagnosis, or treatment of a health care provider based on the health care provider's examination and assessment of a patient's specific and unique circumstances. Patients must speak with a health care provider for complete information about their health, medical questions, and treatment options, including any risks or benefits regarding use of medications. This information does not endorse any treatments or medications as safe, effective, or approved for treating a specific patient. UpToDate, Inc. and its affiliates disclaim any warranty or liability relating to this information or the use thereof.The use of this information is governed by the Terms of Use, available at https://www.wolterskluwer.com/en/know/clinical-effectiveness-terms. 2024© UpToDate, Inc. and its affiliates and/or licensors. All rights reserved.  Copyright   © 2024 UpToDate, Inc. and/or its affiliates. All rights reserved.

## 2025-04-14 NOTE — TELEPHONE ENCOUNTER
Pt called and stated he was able to schedule MRI with [facility] for an earlier date of 4/26/25. Pt is r/s for f/u to review results 5/20/25 and will have images placed on disc and will drop at office for provider to review

## 2025-04-15 ENCOUNTER — TELEPHONE (OUTPATIENT)
Dept: OBGYN CLINIC | Facility: HOSPITAL | Age: 75
End: 2025-04-15

## 2025-04-15 NOTE — TELEPHONE ENCOUNTER
"Received voicemail from patient:    \"Hi, this is Rivas Nathan. Birthday is March 29, 1950. It's probably under Flynn. I have a surgery appointment with Doctor Ramin for May 1st at the [facility] and I had a call from the drug company a week or so ago and a little bit confused as to why they even called me but they said I should check in with you folks just to make sure that the surgery is still on. My cell number is [phone]. Doctor Valorie is putting on a digit widget on May 1st and it's down at the [facility], so just following up to make sure we're still good to go. And then you got the medication to treat my Dupatins. Thank you so much, Have a good day.\"      ---    LMOM for pt that we have the injections but I'm waiting to find out when they will begin for him. Please call patient and let him know when he should expect the injections. Thank you!       I also let him know that the authorization team hasn't started his authorization for surgery yet, which is why the insurance was unaware of the procedure on 5/1. I did let him know that the team will begin the authorization.         "

## 2025-04-17 ENCOUNTER — ANESTHESIA EVENT (OUTPATIENT)
Dept: PERIOP | Facility: HOSPITAL | Age: 75
End: 2025-04-17
Payer: MEDICARE

## 2025-04-23 RX ORDER — DIAZEPAM 2 MG/1
TABLET ORAL
COMMUNITY

## 2025-04-23 NOTE — PRE-PROCEDURE INSTRUCTIONS
Pre-Surgery Instructions:   Medication Instructions    acetaminophen (TYLENOL) 325 mg tablet Uses PRN- OK to take day of surgery    aspirin 81 mg chewable tablet Take night before surgery    azelastine (ASTELIN) 0.1 % nasal spray Uses PRN- OK to take day of surgery    Coenzyme Q10 (COQ-10) 100 MG CAPS Stop taking 7 days prior to surgery.    diazepam (VALIUM) 2 mg tablet Prior to MRI    diphenhydrAMINE (BENADRYL) 25 mg tablet Take night before surgery    ezetimibe (ZETIA) 10 mg tablet Take day of surgery.    famotidine (PEPCID) 20 mg tablet Take day of surgery.    Linzess 72 MCG CAPS Hold day of surgery.    metoprolol succinate (TOPROL-XL) 25 mg 24 hr tablet Take day of surgery.    multivitamin (THERAGRAN) TABS Stop taking 7 days prior to surgery.    naproxen (NAPROSYN) 500 mg tablet Stop taking 7 days prior to surgery.    Omega-3 Fatty Acids (FISH OIL) 1,000 mg Stop taking 7 days prior to surgery.    rosuvastatin (CRESTOR) 10 MG tablet Take day of surgery.    VITAMIN D, ERGOCALCIFEROL, PO Hold day of surgery.      Medication instructions for day of surgery reviewed. Please take all instructed medications with only a sip of water.       You will receive a call one business day prior to surgery with an arrival time and hospital directions. If your surgery is scheduled on a Monday, the hospital will be calling you on the Friday prior to your surgery. If you have not heard from anyone by 8pm, please call the hospital supervisor through the hospital  at [phone].     Do not eat or drink anything after midnight the night before your surgery, including candy, mints, lifesavers, or chewing gum. Do not drink alcohol 24hrs before your surgery. Try not to smoke at least 24hrs before your surgery.       Follow the pre surgery showering instructions as listed in the “My Surgical Experience Booklet” or otherwise provided by your surgeon's office. Do not use a blade to shave the surgical area 1 week before surgery. It  is okay to use a clean electric clippers up to 24 hours before surgery. Do not apply any lotions, creams, including makeup, cologne, deodorant, or perfumes after showering on the day of your surgery. Do not use dry shampoo, hair spray, hair gel, or any type of hair products.     No contact lenses, eye make-up, or artificial eyelashes. Remove nail polish, including gel polish, and any artificial, gel, or acrylic nails if possible. Remove all jewelry including rings and body piercing jewelry.     Wear causal clothing that is easy to take on and off. Consider your type of surgery.    Keep any valuables, jewelry, piercings at home. Please bring any specially ordered equipment (sling, braces) if indicated.    Arrange for a responsible person to drive you to and from the hospital on the day of your surgery. Please confirm the visitor policy for the day of your procedure when you receive your phone call with an arrival time.     Call the surgeon's office with any new illnesses, exposures, or additional questions prior to surgery.    Please reference your “My Surgical Experience Booklet” for additional information to prepare for your upcoming surgery.

## 2025-04-28 ENCOUNTER — TELEPHONE (OUTPATIENT)
Dept: UROLOGY | Facility: MEDICAL CENTER | Age: 75
End: 2025-04-28

## 2025-04-28 NOTE — TELEPHONE ENCOUNTER
Patient dropped off disk of MRI prostate done at [facility].  Dr. Cunningham had asked to see it.    Disc was sent to [facility] to have downloaded into PACS.    Patient does not want disc back

## 2025-04-29 ENCOUNTER — TELEPHONE (OUTPATIENT)
Dept: OBGYN CLINIC | Facility: CLINIC | Age: 75
End: 2025-04-29

## 2025-04-29 NOTE — TELEPHONE ENCOUNTER
Called patient, no answer, left message for patient to call back so that I can schedule his appointments for his xiaflex injection and release. Appointment must be scheduled 2 days apart.

## 2025-04-29 NOTE — TELEPHONE ENCOUNTER
Caller: patient     Doctor: Dr. Eugene    Reason for call: received VM, calling to schedule injections    Call back#: [phone]-cell phone

## 2025-04-29 NOTE — TELEPHONE ENCOUNTER
Spoke to patient advised him that we will discuss xiaflex appointment after his surgery for the digit widget

## 2025-04-30 NOTE — ANESTHESIA PREPROCEDURE EVALUATION
Procedure:  Left small finger application digit widget (Left: Arm Lower)    Relevant Problems   CARDIO   (+) Asymptomatic carotid artery stenosis   (+) Atherosclerosis of native coronary artery   (+) Coronary artery disease involving native coronary artery   (+) Hyperlipidemia   (+) Hypertension   (+) Migraine headache      GI/HEPATIC   (+) Gastroesophageal reflux disease      /RENAL   (+) Benign prostate hyperplasia      HEMATOLOGY   (+) Anemia      MUSCULOSKELETAL   (+) Arthritis      NEURO/PSYCH   (+) Headache   (+) Migraine headache      Surgery/Wound/Pain   (+) Status post insertion of drug eluting coronary artery stent      Hx CABG x 4  4/25/2023: cardiac cath: new MAYITO placement x 2 at [facility]  On ASA currently    Physical Exam    Airway    Mallampati score: III  TM Distance: >3 FB  Neck ROM: full     Dental   No notable dental hx     Cardiovascular      Pulmonary      Other Findings        Anesthesia Plan  ASA Score- 3     Anesthesia Type- general with ASA Monitors.         Additional Monitors:     Airway Plan:     Comment: GA with LMA, IV, antiemetics.       Plan Factors-    Chart reviewed.    Patient summary reviewed.    Patient is not a current smoker.              Induction- intravenous.    Postoperative Plan- . Planned trial extubation        Informed Consent- Anesthetic plan and risks discussed with patient.  I personally reviewed this patient with the CRNA. Discussed and agreed on the Anesthesia Plan with the CRNA..      NPO Status:  No vitals data found for the desired time range.

## 2025-05-01 ENCOUNTER — APPOINTMENT (OUTPATIENT)
Dept: RADIOLOGY | Facility: HOSPITAL | Age: 75
End: 2025-05-01
Payer: MEDICARE

## 2025-05-01 ENCOUNTER — ANESTHESIA (OUTPATIENT)
Dept: PERIOP | Facility: HOSPITAL | Age: 75
End: 2025-05-01
Payer: MEDICARE

## 2025-05-01 ENCOUNTER — HOSPITAL ENCOUNTER (OUTPATIENT)
Facility: HOSPITAL | Age: 75
Setting detail: OUTPATIENT SURGERY
Discharge: HOME | End: 2025-05-01
Attending: ORTHOPAEDIC SURGERY | Admitting: ORTHOPAEDIC SURGERY
Payer: MEDICARE

## 2025-05-01 VITALS
HEIGHT: 65 IN | RESPIRATION RATE: 13 BRPM | SYSTOLIC BLOOD PRESSURE: 128 MMHG | DIASTOLIC BLOOD PRESSURE: 73 MMHG | OXYGEN SATURATION: 100 % | TEMPERATURE: 98 F | HEART RATE: 50 BPM | BODY MASS INDEX: 27.09 KG/M2 | WEIGHT: 162.6 LBS

## 2025-05-01 DIAGNOSIS — M72.0 DUPUYTREN'S CONTRACTURE: Primary | Chronic | ICD-10-CM

## 2025-05-01 PROCEDURE — 73120 X-RAY EXAM OF HAND: CPT

## 2025-05-01 PROCEDURE — NC001 PR NO CHARGE: Performed by: ORTHOPAEDIC SURGERY

## 2025-05-01 PROCEDURE — 20690 APPL UNIPLN UNI EXT FIXJ SYS: CPT | Performed by: ORTHOPAEDIC SURGERY

## 2025-05-01 PROCEDURE — C1713 ANCHOR/SCREW BN/BN,TIS/BN: HCPCS | Performed by: ORTHOPAEDIC SURGERY

## 2025-05-01 DEVICE — EXTERNAL FIXATION SYSTEM FOR TREATMENT OF PIP JOINT FLEXION CONTRACTURES.
Type: IMPLANTABLE DEVICE | Status: FUNCTIONAL
Brand: AGEE DIGIT WIDGET®

## 2025-05-01 RX ORDER — LIDOCAINE HYDROCHLORIDE 10 MG/ML
INJECTION, SOLUTION EPIDURAL; INFILTRATION; INTRACAUDAL; PERINEURAL AS NEEDED
Status: DISCONTINUED | OUTPATIENT
Start: 2025-05-01 | End: 2025-05-01

## 2025-05-01 RX ORDER — FENTANYL CITRATE/PF 50 MCG/ML
25 SYRINGE (ML) INJECTION
Status: DISCONTINUED | OUTPATIENT
Start: 2025-05-01 | End: 2025-05-01 | Stop reason: HOSPADM

## 2025-05-01 RX ORDER — PROPOFOL 10 MG/ML
INJECTION, EMULSION INTRAVENOUS AS NEEDED
Status: DISCONTINUED | OUTPATIENT
Start: 2025-05-01 | End: 2025-05-01

## 2025-05-01 RX ORDER — ONDANSETRON 2 MG/ML
4 INJECTION INTRAMUSCULAR; INTRAVENOUS EVERY 6 HOURS PRN
Status: DISCONTINUED | OUTPATIENT
Start: 2025-05-01 | End: 2025-05-01

## 2025-05-01 RX ORDER — HYDROMORPHONE HCL IN WATER/PF 6 MG/30 ML
0.2 PATIENT CONTROLLED ANALGESIA SYRINGE INTRAVENOUS
Status: DISCONTINUED | OUTPATIENT
Start: 2025-05-01 | End: 2025-05-01 | Stop reason: HOSPADM

## 2025-05-01 RX ORDER — ACETAMINOPHEN 325 MG/1
650 TABLET ORAL EVERY 6 HOURS PRN
Status: DISCONTINUED | OUTPATIENT
Start: 2025-05-01 | End: 2025-05-01 | Stop reason: HOSPADM

## 2025-05-01 RX ORDER — ONDANSETRON 2 MG/ML
INJECTION INTRAMUSCULAR; INTRAVENOUS AS NEEDED
Status: DISCONTINUED | OUTPATIENT
Start: 2025-05-01 | End: 2025-05-01

## 2025-05-01 RX ORDER — CEFAZOLIN SODIUM 2 G/50ML
2000 SOLUTION INTRAVENOUS ONCE
Status: COMPLETED | OUTPATIENT
Start: 2025-05-01 | End: 2025-05-01

## 2025-05-01 RX ORDER — ONDANSETRON 2 MG/ML
4 INJECTION INTRAMUSCULAR; INTRAVENOUS ONCE AS NEEDED
Status: DISCONTINUED | OUTPATIENT
Start: 2025-05-01 | End: 2025-05-01 | Stop reason: HOSPADM

## 2025-05-01 RX ORDER — TRAMADOL HYDROCHLORIDE 50 MG/1
50 TABLET ORAL EVERY 6 HOURS PRN
Qty: 5 TABLET | Refills: 0 | Status: SHIPPED | OUTPATIENT
Start: 2025-05-01

## 2025-05-01 RX ORDER — SODIUM CHLORIDE, SODIUM LACTATE, POTASSIUM CHLORIDE, CALCIUM CHLORIDE 600; 310; 30; 20 MG/100ML; MG/100ML; MG/100ML; MG/100ML
125 INJECTION, SOLUTION INTRAVENOUS CONTINUOUS
Status: DISCONTINUED | OUTPATIENT
Start: 2025-05-01 | End: 2025-05-01 | Stop reason: HOSPADM

## 2025-05-01 RX ORDER — SENNOSIDES 8.6 MG
650 CAPSULE ORAL EVERY 8 HOURS PRN
Qty: 30 TABLET | Refills: 0 | Status: SHIPPED | OUTPATIENT
Start: 2025-05-01

## 2025-05-01 RX ORDER — MAGNESIUM HYDROXIDE 1200 MG/15ML
LIQUID ORAL AS NEEDED
Status: DISCONTINUED | OUTPATIENT
Start: 2025-05-01 | End: 2025-05-01 | Stop reason: HOSPADM

## 2025-05-01 RX ORDER — LIDOCAINE HYDROCHLORIDE 10 MG/ML
0.5 INJECTION, SOLUTION EPIDURAL; INFILTRATION; INTRACAUDAL; PERINEURAL ONCE AS NEEDED
Status: DISCONTINUED | OUTPATIENT
Start: 2025-05-01 | End: 2025-05-01 | Stop reason: HOSPADM

## 2025-05-01 RX ORDER — TRAMADOL HYDROCHLORIDE 50 MG/1
50 TABLET ORAL EVERY 6 HOURS PRN
Status: DISCONTINUED | OUTPATIENT
Start: 2025-05-01 | End: 2025-05-01 | Stop reason: HOSPADM

## 2025-05-01 RX ORDER — COVID-19 ANTIGEN TEST
220 KIT MISCELLANEOUS 2 TIMES DAILY
Qty: 60 CAPSULE | Refills: 0 | Status: SHIPPED | OUTPATIENT
Start: 2025-05-01 | End: 2025-05-31

## 2025-05-01 RX ORDER — DEXAMETHASONE SODIUM PHOSPHATE 10 MG/ML
INJECTION, SOLUTION INTRAMUSCULAR; INTRAVENOUS AS NEEDED
Status: DISCONTINUED | OUTPATIENT
Start: 2025-05-01 | End: 2025-05-01

## 2025-05-01 RX ORDER — GLYCOPYRROLATE 0.2 MG/ML
INJECTION INTRAMUSCULAR; INTRAVENOUS AS NEEDED
Status: DISCONTINUED | OUTPATIENT
Start: 2025-05-01 | End: 2025-05-01

## 2025-05-01 RX ORDER — FENTANYL CITRATE 50 UG/ML
INJECTION, SOLUTION INTRAMUSCULAR; INTRAVENOUS AS NEEDED
Status: DISCONTINUED | OUTPATIENT
Start: 2025-05-01 | End: 2025-05-01

## 2025-05-01 RX ADMIN — DEXAMETHASONE SODIUM PHOSPHATE 10 MG: 10 INJECTION, SOLUTION INTRAMUSCULAR; INTRAVENOUS at 11:10

## 2025-05-01 RX ADMIN — FENTANYL CITRATE 50 MCG: 50 INJECTION, SOLUTION INTRAMUSCULAR; INTRAVENOUS at 11:09

## 2025-05-01 RX ADMIN — GLYCOPYRROLATE 0.1 MG: 0.2 INJECTION INTRAMUSCULAR; INTRAVENOUS at 11:45

## 2025-05-01 RX ADMIN — PROPOFOL 150 MG: 10 INJECTION, EMULSION INTRAVENOUS at 11:10

## 2025-05-01 RX ADMIN — SODIUM CHLORIDE, SODIUM LACTATE, POTASSIUM CHLORIDE, AND CALCIUM CHLORIDE: .6; .31; .03; .02 INJECTION, SOLUTION INTRAVENOUS at 11:04

## 2025-05-01 RX ADMIN — ONDANSETRON 4 MG: 2 INJECTION INTRAMUSCULAR; INTRAVENOUS at 11:10

## 2025-05-01 RX ADMIN — LIDOCAINE HYDROCHLORIDE 50 MG: 10 INJECTION, SOLUTION EPIDURAL; INFILTRATION; INTRACAUDAL; PERINEURAL at 11:10

## 2025-05-01 RX ADMIN — CEFAZOLIN SODIUM 2000 MG: 2 SOLUTION INTRAVENOUS at 11:02

## 2025-05-01 NOTE — ANESTHESIA POSTPROCEDURE EVALUATION
Post-Op Assessment Note    CV Status:  Stable  Pain Score: 0    Pain management: adequate       Mental Status:  Awake and alert   Hydration Status:  Stable   PONV Controlled:  None   Airway Patency:  Patent     Post Op Vitals Reviewed: Yes    No anethesia notable event occurred.    Staff: CRNA           Last Filed PACU Vitals:  Vitals Value Taken Time   Temp 97.7 °F (36.5 °C) 05/01/25 1201   Pulse 70 05/01/25 1203   /62 05/01/25 1201   Resp 11 05/01/25 1203   SpO2 100 % 05/01/25 1203   Vitals shown include unfiled device data.

## 2025-05-01 NOTE — H&P
H&P Exam - Orthopedics   Flynn Nathan 75 y.o. male MRN: [id number]  Unit/Bed#: [facility] 02    Assessment/Plan   Assessment:  Left hand small finger dupuytrens disease  Plan:  Left hand small finger application of digit widget under general anesthesia     History of Present Illness   HPI:  Flynn Nathan is a 75 y.o. male who presents with left hand small finger flexion contracture. He states its affecting his activities of daily living. He would like to proceed with surgery.    Historical Information  Review Of Systems:   Skin: Normal  Neuro: See HPI  Musculoskeletal: See HPI  14 point review of systems negative except as stated above     Past Medical History:   Past Medical History:   Diagnosis Date    Arteriosclerotic heart disease     Coronary artery disease Pre 2000    COVID-19     3/2025    Elevated PSA 2024    GERD (gastroesophageal reflux disease)     Unknown    Hyperlipidemia     Migraine Age 5    Tinnitus 2022    Left ear       Past Surgical History:   Past Surgical History:   Procedure Laterality Date    CARDIAC CATHETERIZATION N/A 04/25/2023    Procedure: Cardiac pci;  Surgeon: Víctor Davis DO;  Location: [facility];  Service: Cardiology    CARDIAC CATHETERIZATION N/A 04/25/2023    Procedure: Cardiac Coronary Angiogram;  Surgeon: Víctor Davis DO;  Location: [facility];  Service: Cardiology    CATARACT EXTRACTION Left 12/2017    CORONARY ANGIOPLASTY WITH STENT PLACEMENT  04/2023    circumflex per pt    CORONARY ARTERY BYPASS GRAFT  01/2006    x4 (LIMA-LAD, sequential Y-graft R [id number] & SVG-rPDA with distal RCA endarterectomy)    EGD  04/2025    HERNIA REPAIR  11/2020    TONSILLECTOMY  Age 5    UPPER GASTROINTESTINAL ENDOSCOPY  04/2025       Family History:  Family history reviewed and non-contributory  Family History   Problem Relation Age of Onset    Hypertension Mother     Migraines Mother     Migraines Father     Heart attack Father     Stroke Maternal  Grandmother     Heart attack Paternal Grandmother        Social History:  Social History     Socioeconomic History    Marital status: /Civil Union     Spouse name: None    Number of children: None    Years of education: None    Highest education level: None   Occupational History    None   Tobacco Use    Smoking status: Never    Smokeless tobacco: Never   Vaping Use    Vaping status: Never Used   Substance and Sexual Activity    Alcohol use: Not Currently     Comment: Weekends, 2-3 x    Drug use: No    Sexual activity: Yes     Partners: Female   Other Topics Concern    None   Social History Narrative    None     Social Drivers of Health     Financial Resource Strain: Low Risk  (10/16/2024)    Received from [facility]    Overall Financial Resource Strain (CARDIA)     Difficulty of Paying Living Expenses: Not hard at all   Food Insecurity: No Food Insecurity (10/16/2024)    Received from [facility]    Hunger Vital Sign     Worried About Running Out of Food in the Last Year: Never true     Ran Out of Food in the Last Year: Never true   Transportation Needs: No Transportation Needs (10/16/2024)    Received from [facility]    PRAPARE - Transportation     Lack of Transportation (Medical): No     Lack of Transportation (Non-Medical): No   Physical Activity: Not on file   Stress: No Stress Concern Present (10/16/2024)    Received from [facility]    [employer] - Occupational Stress Questionnaire     Feeling of Stress : Not at all   Social Connections: Feeling Socially Integrated (10/16/2024)    Received from [facility]    OASIS : Social Isolation     How often do you feel lonely or isolated from those around you?: Never   Intimate Partner Violence: Not At Risk (10/16/2024)    Received from [facility], [facility]    Humiliation, Afraid, Rape, and Kick  "questionnaire     Fear of Current or Ex-Partner: No     Emotionally Abused: No     Physically Abused: No     Sexually Abused: No   Housing Stability: Low Risk  (10/16/2024)    Received from [facility]    Housing Stability Vital Sign     Unable to Pay for Housing in the Last Year: No     Number of Times Moved in the Last Year: 0     Homeless in the Last Year: No       Allergies:   No Known Allergies        Labs:  0   Lab Value Date/Time    HCT 41.0 04/26/2023 1020    HCT 42.9 04/21/2023 0816    HCT 41.2 09/01/2017 0853    HGB 14.1 04/26/2023 1020    HGB 14.8 04/21/2023 0816    HGB 14.3 09/01/2017 0853    WBC 8.47 04/26/2023 1020    WBC 6.09 04/21/2023 0816    WBC 6.90 09/01/2017 0853       Meds:    Current Facility-Administered Medications:     ceFAZolin (ANCEF) IVPB (premix in dextrose) 2,000 mg 50 mL, 2,000 mg, Intravenous, Once, Kuldeep Mcqueen PA-C    fentaNYL (SUBLIMAZE) injection 25 mcg, 25 mcg, Intravenous, Q3 min PRN, Mattie Hodge MD    HYDROmorphone HCl (DILAUDID) injection 0.2 mg, 0.2 mg, Intravenous, Q5 Min PRN, Mattie Hodge MD    lactated ringers infusion, 125 mL/hr, Intravenous, Continuous, Mattie Hodge MD    lidocaine (PF) (XYLOCAINE-MPF) 1 % injection 0.5 mL, 0.5 mL, Infiltration, Once PRN, Mattie Hodge MD    ondansetron (ZOFRAN) injection 4 mg, 4 mg, Intravenous, Once PRN, Mattie Hodge MD    Blood Culture:   No results found for: \"BLOODCX\"    Wound Culture:   No results found for: \"WOUNDCULT\"    Ins and Outs:  No intake/output data recorded.            Physical Exam  /77   Pulse 71   Temp (!) 97 °F (36.1 °C) (Temporal)   Resp 16   Ht 5' 5\" (1.651 m)   Wt 73.8 kg (162 lb 9.6 oz)   SpO2 98%   BMI 27.06 kg/m²   /77   Pulse 71   Temp (!) 97 °F (36.1 °C) (Temporal)   Resp 16   Ht 5' 5\" (1.651 m)   Wt 73.8 kg (162 lb 9.6 oz)   SpO2 98%   BMI 27.06 kg/m²   Gen: No acute distress, resting comfortably in bed  HEENT: Eyes clear, moist mucus membranes, hearing " intact  Respiratory: No audible wheezing or stridor  Cardiovascular: Well Perfused peripherally, 2+ distal pulse  Abdomen: nondistended, no peritoneal signs  Ortho Exam: Left hand   Dupuytren's contractures-  Pre-central cord in line with left long finger with MP joint contracture of 60 degrees  Pre-central cord in line with left ring finger with MP joint contracture of 36 degrees  Pre-central and ulnar spiral non continuous in line with small finger MP of 58 degrees and to PIP of 78 degrees  Neuro Exam: Patient is neurovascularly intact from a median, radial and ulnar nerve distribution. Capillary refill less than 2 seconds.       Lab Results: Reviewed  Imaging: Reviewed

## 2025-05-01 NOTE — ANESTHESIA POSTPROCEDURE EVALUATION
Post-Op Assessment Note    CV Status:  Stable  Pain Score: 0    Pain management: adequate       Mental Status:  Alert and awake   Hydration Status:  Stable   PONV Controlled:  None   Airway Patency:  Patent     Post Op Vitals Reviewed: Yes    No anethesia notable event occurred.    Staff: Anesthesiologist           Last Filed PACU Vitals:  Vitals Value Taken Time   Temp 97.7 °F (36.5 °C) 05/01/25 1201   Pulse 48 05/01/25 1252   /77 05/01/25 1245   Resp 11 05/01/25 1252   SpO2 100 % 05/01/25 1252   Vitals shown include unfiled device data.    Modified Manda:     Vitals Value Taken Time   Activity 2 05/01/25 1201   Respiration 2 05/01/25 1201   Circulation 2 05/01/25 1201   Consciousness 1 05/01/25 1201   Oxygen Saturation 2 05/01/25 1201     Modified Manda Score: 9

## 2025-05-01 NOTE — DISCHARGE INSTR - AVS FIRST PAGE
Post Operative Instructions    You have had surgery on your arm today, please read and follow the information below:  Elevate your hand above your elbow during the next 24-48 hours to help with swelling.  Place your hand and arm over your head with motion at your shoulder three times a day.  Do not apply any cream/ointment/oil to your incisions including antibiotics.  Do not soak your hands in standing water (dishwater, tubs, Jacuzzi's, pools, etc.) until given permission (typically 2-3 weeks after injury)    Call the office if you notice any:  Increased numbness or tingling of your hand or fingers that is not relieved with elevation.  Increasing pain that is not controlled with medication.  Difficulty chewing, breathing, swallowing.  Chest pains or shortness of breath.  Fever over 101.4 degrees.    Bandage: Do NOT remove bandage until follow-up appointment.    Motion: Move fingers into a fist 5 times a day, DO NOT move any splinted fingers.    Weight bearing status: The operated extremity should be non-weight bearing until further notice.    Ice: Ice for 10 minutes every hour as needed for swelling x 24 hours.    Sling: No sling necessary.    Medications:   Naproxen 220 mg two times a day   Tylenol Extended Release 650 mg every 8 hours  Tramadol 1 tab every 6 hours AS needed for pain    After surgery, we would like you to take naproxen 220 mg one tablet by mouth every 12 hours with food  AND Tylenol 650 mg one tablet by mouth every 8 hours  (at breakfast, lunch and dinner) for 3-5 days after your surgery.  Please take these medication EVERYDAY after surgery for 3-5 days, and not just as needed. You can take these medications at the same time.  Taking these medications after surgery will limit your need for prescription pain medication.      We will also prescribe a narcotic pain medication for a limited time after surgery that you can take as needed for moderate or severe pain.      Follow-up Appointment: 7-10  days.      Please call the office if you have any questions or concerns regarding your post-operative care.

## 2025-05-02 NOTE — OP NOTE
OPERATIVE REPORT  PATIENT NAME: Flynn Nathan  :  1950  MRN: [id number]  Pt Location: [facility] MAIN OR    SURGERY DATE: 25    Surgeons and Role:     * Raza Eugene MD - Primary     * Rafa Narayanan MD - Assisting    Pre-Op Diagnosis:  Dupuytren's disease [M72.0]    Post-Op Diagnosis:  Dupuytren's disease [M72.0]    Procedure(s) (LRB):  Left small finger application digit widget (Left)    Specimen(s):  No specimens collected during this procedure.    Estimated Blood Loss:   Minimal      Anesthesia Type:   General    Operative Indications:  The patient has a history of Dupuytren's disease with significant contracture to the left hand small finger at the proximal interphalangeal joint that was recalcitrant to conservative management.  The decision was made to bring the patient to the operating room for application digit widget left hand small finger.  Risks of the procedure were explained which include, but are not limited to bleeding; infection; damage to nerves, arteries,veins, tendons; scar; pain; need for reoperation; failure to give desired result; and risks of anaesthesia.  All questions were answered to satisfaction and they were willing to proceed.         Operative Findings:  Dupuytren's disease left hand small finger with contracture at the proximal interphalangeal joint    Complications:   None    Procedure and Technique:  After the patient, site, and procedure were identified, the patient was brought into the operating room in a supine position.  General anaesthesia and local medication were provided.  A tourniquet was not used.  The  left upper extremity was then prepped and drapped in a normal, sterile, orthopedic fashion.    After the patient, site, and procedure were identified attention was turned towards the left hand.  Fluoroscopic guidance was utilized throughout the entirety of the procedure.  The guide for the digit widget was placed on the dorsal aspect of the small  finger taking care to place within the central aspect dorsally.  We aligned the guide so that the proximal pin would just be distal to the articular surface.  While utilizing fluoroscopic guidance, the proximal followed by the distal guidepin were then placed bicortically.  The distal guide pin was then removed and replaced with bicortical threaded pin.  Proximal guide pin was then removed and replaced with bicortical threaded pin.  These pins were cut to the appropriate length, guide was removed, and the digit widget clip was applied and tightened.  Pins were cut to final length.  Fluoroscopic images confirmed good positioning.  Local medication was applied.    Please note, all sponge, needle, and instrument counts were correct prior to closure.  Loupe magnification was utilized.   The patient tolerated the procedure well.     I was present for the entire procedure.    Patient Disposition:  PACU     SIGNATURE: Raza Eugene MD  DATE: 05/02/25  TIME: 7:57 AM

## 2025-05-12 ENCOUNTER — OFFICE VISIT (OUTPATIENT)
Dept: OBGYN CLINIC | Facility: CLINIC | Age: 75
End: 2025-05-12

## 2025-05-12 VITALS — HEIGHT: 65 IN | BODY MASS INDEX: 26.99 KG/M2 | WEIGHT: 162 LBS

## 2025-05-12 DIAGNOSIS — M72.0 DUPUYTREN'S DISEASE: Primary | ICD-10-CM

## 2025-05-12 PROCEDURE — 99024 POSTOP FOLLOW-UP VISIT: CPT | Performed by: ORTHOPAEDIC SURGERY

## 2025-05-13 NOTE — PROGRESS NOTES
"    ORTHOPAEDIC HAND, WRIST, AND ELBOW OFFICE  VISIT     Name: Flynn Nathan      : 1950      MRN: [id number]  Encounter Provider: Raza Eugene MD  Encounter Date: 2025   Encounter department: [facility] [place]  :  Assessment & Plan  Dupuytren's disease  Digit widget is intact.      Maintain light rubber bands  Follow up in 1 week.               History of Present Illness   HPI  Chief Complaint   Patient presents with   • Left Hand - Post-op     Digit Widget- 25       SUBJECTIVE:  Flynn Nathan is a 75 y.o. male who presents for follow up after Left small finger application digit widget - Left on 2025.  Today patient has No Complaints.       PHYSICAL EXAMINATION:  Vital signs: Ht 5' 5\" (1.651 m)   Wt 73.5 kg (162 lb)   BMI 26.96 kg/m²   General: well developed and well nourished, alert, oriented times 3, and appears comfortable  Psychiatric: Normal    MUSCULOSKELETAL EXAMINATION:  Incision: No incision  Range of Motion:  MP 55, PIP 52  Neurovascular status: Neuro intact, good cap refill  Activity Restrictions: No restrictions         STUDIES REVIEWED:  No Studies to review      PROCEDURES PERFORMED:  Procedures  -  "

## 2025-05-20 ENCOUNTER — OFFICE VISIT (OUTPATIENT)
Dept: UROLOGY | Facility: MEDICAL CENTER | Age: 75
End: 2025-05-20
Payer: MEDICARE

## 2025-05-20 VITALS
HEART RATE: 60 BPM | BODY MASS INDEX: 26.99 KG/M2 | HEIGHT: 65 IN | DIASTOLIC BLOOD PRESSURE: 80 MMHG | WEIGHT: 162 LBS | SYSTOLIC BLOOD PRESSURE: 110 MMHG | OXYGEN SATURATION: 97 %

## 2025-05-20 DIAGNOSIS — R97.20 ELEVATED PSA: Primary | ICD-10-CM

## 2025-05-20 PROCEDURE — 99214 OFFICE O/P EST MOD 30 MIN: CPT | Performed by: UROLOGY

## 2025-05-20 RX ORDER — CEFAZOLIN SODIUM 1 G/3ML
2000 INJECTION, POWDER, FOR SOLUTION INTRAMUSCULAR; INTRAVENOUS ONCE
OUTPATIENT
Start: 2025-05-20

## 2025-05-20 NOTE — ASSESSMENT & PLAN NOTE
Multiparametric MRI reveals a 75 g prostate with a PI-RADS 4 lesion.  Options were discussed we will proceed with a transperineal fusion ultrasound guided prostate biopsy.  As the MRI was done at [facility] we will likely need to use intellectual fusion.  The procedure was described in detail.  Risks were discussed and consent form signed.  Orders:    Case request operating room: TRANSPERINEAL MRI FUSION BIOPSY PROSTATE; Standing    UA (URINE) with reflex to Scope; Future    CBC and differential; Future    EKG 12 lead; Future    ceFAZolin (ANCEF) injection 2,000 mg

## 2025-05-20 NOTE — LETTER
May 20, 2025     Yuliana Kincaid MD  [address]  [address]  [place] [address]    Patient: Flynn Nathan   YOB: 1950   Date of Visit: 2025       Dear Dr. Yuliana Kincaid MD:    Thank you for referring Flynn Nathan to me for evaluation. Below are my notes for this consultation.    If you have questions, please do not hesitate to call me. I look forward to following your patient along with you.         Sincerely,        Donny Cunningham MD        CC: No Recipients    Donny Cunningham MD  2025  9:06 AM  Sign when Signing Visit  Name: Flynn Nathan      : 1950      MRN: [id number]  Encounter Provider: Donny Cunningham MD  Encounter Date: 2025   Encounter department: [facility] [place]  :  Assessment & Plan  Elevated PSA  Multiparametric MRI reveals a 75 g prostate with a PI-RADS 4 lesion.  Options were discussed we will proceed with a transperineal fusion ultrasound guided prostate biopsy.  As the MRI was done at [facility] we will likely need to use intellectual fusion.  The procedure was described in detail.  Risks were discussed and consent form signed.  Orders:  •  Case request operating room: TRANSPERINEAL MRI FUSION BIOPSY PROSTATE; Standing  •  UA (URINE) with reflex to Scope; Future  •  CBC and differential; Future  •  EKG 12 lead; Future  •  ceFAZolin (ANCEF) injection 2,000 mg        History of Present Illness  Flynn Nathan is a 75 y.o. male who presents for follow-up.  He has had an MRI of his prostate and evaluation of an elevated PSA.  He notes he is voiding well.  No gross hematuria or symptoms of infection.    Review of Systems   Constitutional: Negative.  Negative for chills, diaphoresis, fatigue and fever.   HENT: Negative.     Eyes: Negative.    Respiratory: Negative.     Cardiovascular: Negative.    Endocrine: Negative.    Genitourinary:         See HPI   Musculoskeletal: Negative.    Skin:  "Negative.    Allergic/Immunologic: Negative.    Neurological: Negative.    Hematological: Negative.    Psychiatric/Behavioral: Negative.            Objective  /80 (BP Location: Left arm, Patient Position: Sitting, Cuff Size: Standard)   Pulse 60   Ht 5' 5\" (1.651 m)   Wt 73.5 kg (162 lb)   SpO2 97%   BMI 26.96 kg/m²     Physical Exam  Constitutional:       General: He is not in acute distress.     Appearance: Normal appearance. He is well-developed and normal weight. He is not ill-appearing, toxic-appearing or diaphoretic.   HENT:      Head: Normocephalic and atraumatic.     Eyes:      General: No scleral icterus.     Conjunctiva/sclera: Conjunctivae normal.       Cardiovascular:      Rate and Rhythm: Normal rate.   Pulmonary:      Effort: Pulmonary effort is normal.   Abdominal:      General: Abdomen is flat. There is no distension.      Palpations: There is no mass.      Tenderness: There is no abdominal tenderness. There is no right CVA tenderness, left CVA tenderness, guarding or rebound.      Hernia: No hernia is present.   Genitourinary:     Penis: Normal.       Testes: Normal.     Musculoskeletal:      Cervical back: Normal range of motion and neck supple.     Skin:     General: Skin is warm and dry.     Neurological:      Mental Status: He is alert and oriented to person, place, and time.     Psychiatric:         Behavior: Behavior normal.         Thought Content: Thought content normal.         Judgment: Judgment normal.          Results   Lab Results   Component Value Date    PSA 5.627 (H) 02/27/2025    PSA 5.858 (H) 11/05/2024    PSA 4.303 (H) 08/01/2024     Lab Results   Component Value Date    GLUCOSE 106 07/08/2015    CALCIUM 9.3 03/19/2025     07/08/2015    K 4.4 03/19/2025    CO2 28 03/19/2025     03/19/2025    BUN 17 03/19/2025    CREATININE 0.87 03/19/2025     Lab Results   Component Value Date    WBC 8.47 04/26/2023    HGB 14.1 04/26/2023    HCT 41.0 04/26/2023    MCV 94 " 04/26/2023     04/26/2023       Office Urine Dip  No results found for this or any previous visit (from the past hour).

## 2025-05-20 NOTE — LETTER
May 20, 2025     Antoine Mata MD  [address]  [address]  [facility] [address]    Patient: Flynn Nathan   YOB: 1950   Date of Visit: 2025       Dear Dr. Antoine Mata MD:    Thank you for referring Flynn Nathan to me for evaluation. Below are my notes for this consultation.    If you have questions, please do not hesitate to call me. I look forward to following your patient along with you.         Sincerely,        Donny Cunningham MD        CC: No Recipients    Donny Cunningham MD  2025  9:06 AM  Sign when Signing Visit  Name: Flynn Nathan      : 1950      MRN: [id number]  Encounter Provider: Donny Cunningham MD  Encounter Date: 2025   Encounter department: [facility]  :  Assessment & Plan  Elevated PSA  Multiparametric MRI reveals a 75 g prostate with a PI-RADS 4 lesion.  Options were discussed we will proceed with a transperineal fusion ultrasound guided prostate biopsy.  As the MRI was done at [facility] we will likely need to use intellectual fusion.  The procedure was described in detail.  Risks were discussed and consent form signed.  Orders:  •  Case request operating room: TRANSPERINEAL MRI FUSION BIOPSY PROSTATE; Standing  •  UA (URINE) with reflex to Scope; Future  •  CBC and differential; Future  •  EKG 12 lead; Future  •  ceFAZolin (ANCEF) injection 2,000 mg        History of Present Illness  Flynn Nathan is a 75 y.o. male who presents for follow-up.  He has had an MRI of his prostate and evaluation of an elevated PSA.  He notes he is voiding well.  No gross hematuria or symptoms of infection.    Review of Systems   Constitutional: Negative.  Negative for chills, diaphoresis, fatigue and fever.   HENT: Negative.     Eyes: Negative.    Respiratory: Negative.     Cardiovascular: Negative.    Endocrine: Negative.    Genitourinary:         See HPI   Musculoskeletal: Negative.    Skin:  "Negative.    Allergic/Immunologic: Negative.    Neurological: Negative.    Hematological: Negative.    Psychiatric/Behavioral: Negative.            Objective  /80 (BP Location: Left arm, Patient Position: Sitting, Cuff Size: Standard)   Pulse 60   Ht 5' 5\" (1.651 m)   Wt 73.5 kg (162 lb)   SpO2 97%   BMI 26.96 kg/m²     Physical Exam  Constitutional:       General: He is not in acute distress.     Appearance: Normal appearance. He is well-developed and normal weight. He is not ill-appearing, toxic-appearing or diaphoretic.   HENT:      Head: Normocephalic and atraumatic.     Eyes:      General: No scleral icterus.     Conjunctiva/sclera: Conjunctivae normal.       Cardiovascular:      Rate and Rhythm: Normal rate.   Pulmonary:      Effort: Pulmonary effort is normal.   Abdominal:      General: Abdomen is flat. There is no distension.      Palpations: There is no mass.      Tenderness: There is no abdominal tenderness. There is no right CVA tenderness, left CVA tenderness, guarding or rebound.      Hernia: No hernia is present.   Genitourinary:     Penis: Normal.       Testes: Normal.     Musculoskeletal:      Cervical back: Normal range of motion and neck supple.     Skin:     General: Skin is warm and dry.     Neurological:      Mental Status: He is alert and oriented to person, place, and time.     Psychiatric:         Behavior: Behavior normal.         Thought Content: Thought content normal.         Judgment: Judgment normal.          Results   Lab Results   Component Value Date    PSA 5.627 (H) 02/27/2025    PSA 5.858 (H) 11/05/2024    PSA 4.303 (H) 08/01/2024     Lab Results   Component Value Date    GLUCOSE 106 07/08/2015    CALCIUM 9.3 03/19/2025     07/08/2015    K 4.4 03/19/2025    CO2 28 03/19/2025     03/19/2025    BUN 17 03/19/2025    CREATININE 0.87 03/19/2025     Lab Results   Component Value Date    WBC 8.47 04/26/2023    HGB 14.1 04/26/2023    HCT 41.0 04/26/2023    MCV 94 " 04/26/2023     04/26/2023       Office Urine Dip  No results found for this or any previous visit (from the past hour).

## 2025-05-20 NOTE — PATIENT INSTRUCTIONS
"  Patient Education     Prostate biopsy   The Basics   Written by the doctors and editors at [facility]   What is a prostate biopsy? -- A prostate biopsy is a procedure to check the prostate for cancer or other problems. The prostate is a gland that surrounds the urethra (the tube that carries urine from the bladder out through the penis) (figure 1). For a biopsy, a doctor takes a small sample of tissue from the prostate.  You might get a prostate biopsy if:   A blood test shows that your PSA level is high - PSA stands for \"prostate-specific antigen.\" It is a protein made by the prostate. PSA levels usually go up when a person has prostate cancer. But they can also go up for reasons that do not involve cancer. A prostate biopsy can help your doctor figure out the cause of a high PSA.   Your doctor finds a problem during a \"digital rectal exam\" - For a digital rectal exam, your doctor puts a gloved finger into your rectum to feel your prostate. If they feel a lump or thickened area during this exam, they might do a prostate biopsy to help figure out the cause.  How do I prepare for prostate biopsy? -- Before your procedure, your doctor will do an exam and ask you about your \"health history.\" This involves asking you questions about any health problems you have or had in the past, past surgeries or biopsies, and any medicines you take. Tell them about:   Any medicines you are taking - This includes any prescription or \"over-the-counter\" medicines you use, plus any herbal supplements you take. It helps to write down and bring a list of any medicines you take, or bring a bag with all of your medicines with you.   Any allergies you have   Any bleeding problems you have - Certain medicines, including some herbs and supplements, can increase the risk of bleeding. Some health conditions also increase this risk. You might need to stop certain medicines for a time before your biopsy.  The doctor or nurse will tell you if you " "need to do anything special to prepare. In some cases, the doctor might prescribe a medicine to help you relax during the procedure.  Before the procedure, you might need to:   Use an enema or suppository to clean out your rectum.   Get a urine test to check for bacteria.   Take an antibiotic.  You will also get information about:   Eating and drinking before your procedure - In some cases, you might need to \"fast\" before the procedure. This means not eating or drinking anything for a period of time. In other cases, you might be allowed to have liquids until a short time before the procedure. Whether you need to fast, and for how long, depends on the procedure you are having.  Ask the doctor or nurse if you have questions or if there is anything you do not understand.  What happens during prostate biopsy? -- This depends on:   If you have had a prostate biopsy before   If the biopsy is done in a doctor's office or in the hospital   The type of biopsy   How many tissue samples the doctor takes  When it is time for the procedure:   You might get an \"IV,\" which is a thin tube that goes into a vein. This can be used to give you fluids and medicines.   You will get anesthesia medicines. This is to make sure that you do not feel pain during the procedure. Types of anesthesia include:   Local - This type of anesthesia uses medicine to numb a small part of your body so you don't feel pain.   Sedatives - These are medicines to make you relax and feel sleepy.   The doctors and nurses will have you lie on your side with your knees and hips bent for the procedure. They will monitor your breathing, blood pressure, and heart rate during the procedure.   The doctor might use an imaging test such as an ultrasound, MRI, or CT scan to take pictures of your prostate. The pictures will guide the doctor as they do the biopsy. Sometimes, the imaging test is done before the procedure. Other times, the doctor does the imaging test during " the procedure.   The doctor will take a small amount of tissue from the prostate. This can be done in 1 of 2 ways:   Transrectal biopsy - The doctor inserts a small ultrasound probe into the rectum. A needle goes through the probe and removes small pieces of tissue.   Transperineal biopsy - The doctor inserts the needle through the skin between the anus and the scrotum to take the tissue samples.   The procedure takes about 15 minutes.  What happens after prostate biopsy? -- After the procedure, the staff will watch you closely as your anesthesia wears off. Most of the time, you can go home a short time after your biopsy.  As you recover:   It is common to have a small amount of bright red blood from your rectum. You might also have a small amount of blood in your urine or semen.   You will get medicine to help with pain, if needed. You can take non-prescription medicines to relieve pain, such as acetaminophen (sample brand name: Tylenol). The doctor might recommend that you avoid ibuprofen (sample brand names: Advil, Motrin) and naproxen (sample brand name: Aleve) because they can increase your risk of bleeding.   Apply a cold gel pack, bag of ice, or bag of frozen vegetables to your perineum every 1 to 2 hours, for 15 minutes each time. Put a thin towel between the ice (or other cold object) and your skin. Some people find it helpful to use ice for the first 2 days after their biopsy.  You can go back to your normal activities after the procedure, including having sex. Some people are more comfortable if they avoid activities like riding a bike, motorcycle, or horse for about a week.  Your doctor or nurse will tell you when to expect your results.  What are the risks of prostate biopsy? -- Your doctor will talk to you about all of the possible risks, and answer your questions. Possible risks include:   Infection   Bleeding from your rectum, or blood in your urine or semen   Very bad pain   A tear in the wall of  the bladder or rectum   Trouble urinating   Trouble getting an erection  When should I call the doctor? -- Call for advice if:   You have a fever of 100.4°F (38°C) or higher, or chills.   You are not able to urinate, and your bladder feels full.   You have pain or burning when you urinate that lasts for more than a few days.   You have large blood clots (the size of a quarter or bigger) in your urine or start seeing more blood in your urine.   Your urine is thick or cloudy, or has a bad smell.   You have more bleeding from your rectum.   You have very bad pain in your belly that is not helped by pain relievers.  All topics are updated as new evidence becomes available and our peer review process is complete.  This topic retrieved from [employer] on: Mar 14, 2024.  Topic 639529 Version 1.0  Release: 32.2.4 - C32.72  © 2024 UpToDate, Inc. and/or its affiliates. All rights reserved.  figure 1: Prostate gland     This drawing shows male internal organs and a close-up of the prostate gland.  Graphic 80581 Version 5.0  Consumer Information Use and Disclaimer   Disclaimer: This generalized information is a limited summary of diagnosis, treatment, and/or medication information. It is not meant to be comprehensive and should be used as a tool to help the user understand and/or assess potential diagnostic and treatment options. It does NOT include all information about conditions, treatments, medications, side effects, or risks that may apply to a specific patient. It is not intended to be medical advice or a substitute for the medical advice, diagnosis, or treatment of a health care provider based on the health care provider's examination and assessment of a patient's specific and unique circumstances. Patients must speak with a health care provider for complete information about their health, medical questions, and treatment options, including any risks or benefits regarding use of medications. This information does not  endorse any treatments or medications as safe, effective, or approved for treating a specific patient. UpToDate, Inc. and its affiliates disclaim any warranty or liability relating to this information or the use thereof.The use of this information is governed by the Terms of Use, available at https://www.[employer].com/en/know/clinical-effectiveness-terms. 2024© UpToDate, Inc. and its affiliates and/or licensors. All rights reserved.  Copyright   © 2024 UpToDate, Inc. and/or its affiliates. All rights reserved.

## 2025-05-20 NOTE — PROGRESS NOTES
"Name: Flynn Nathan      : 1950      MRN: [id number]  Encounter Provider: Donny Cunningham MD  Encounter Date: 2025   Encounter department: [facility] [place]  :  Assessment & Plan  Elevated PSA  Multiparametric MRI reveals a 75 g prostate with a PI-RADS 4 lesion.  Options were discussed we will proceed with a transperineal fusion ultrasound guided prostate biopsy.  As the MRI was done at [facility] we will likely need to use intellectual fusion.  The procedure was described in detail.  Risks were discussed and consent form signed.  Orders:    Case request operating room: TRANSPERINEAL MRI FUSION BIOPSY PROSTATE; Standing    UA (URINE) with reflex to Scope; Future    CBC and differential; Future    EKG 12 lead; Future    ceFAZolin (ANCEF) injection 2,000 mg        History of Present Illness   Flynn Nathan is a 75 y.o. male who presents for follow-up.  He has had an MRI of his prostate and evaluation of an elevated PSA.  He notes he is voiding well.  No gross hematuria or symptoms of infection.    Review of Systems   Constitutional: Negative.  Negative for chills, diaphoresis, fatigue and fever.   HENT: Negative.     Eyes: Negative.    Respiratory: Negative.     Cardiovascular: Negative.    Endocrine: Negative.    Genitourinary:         See HPI   Musculoskeletal: Negative.    Skin: Negative.    Allergic/Immunologic: Negative.    Neurological: Negative.    Hematological: Negative.    Psychiatric/Behavioral: Negative.            Objective   /80 (BP Location: Left arm, Patient Position: Sitting, Cuff Size: Standard)   Pulse 60   Ht 5' 5\" (1.651 m)   Wt 73.5 kg (162 lb)   SpO2 97%   BMI 26.96 kg/m²     Physical Exam  Constitutional:       General: He is not in acute distress.     Appearance: Normal appearance. He is well-developed and normal weight. He is not ill-appearing, toxic-appearing or diaphoretic.   HENT:      Head: Normocephalic and " atraumatic.     Eyes:      General: No scleral icterus.     Conjunctiva/sclera: Conjunctivae normal.       Cardiovascular:      Rate and Rhythm: Normal rate.   Pulmonary:      Effort: Pulmonary effort is normal.   Abdominal:      General: Abdomen is flat. There is no distension.      Palpations: There is no mass.      Tenderness: There is no abdominal tenderness. There is no right CVA tenderness, left CVA tenderness, guarding or rebound.      Hernia: No hernia is present.   Genitourinary:     Penis: Normal.       Testes: Normal.     Musculoskeletal:      Cervical back: Normal range of motion and neck supple.     Skin:     General: Skin is warm and dry.     Neurological:      Mental Status: He is alert and oriented to person, place, and time.     Psychiatric:         Behavior: Behavior normal.         Thought Content: Thought content normal.         Judgment: Judgment normal.          Results   Lab Results   Component Value Date    PSA 5.627 (H) 02/27/2025    PSA 5.858 (H) 11/05/2024    PSA 4.303 (H) 08/01/2024     Lab Results   Component Value Date    GLUCOSE 106 07/08/2015    CALCIUM 9.3 03/19/2025     07/08/2015    K 4.4 03/19/2025    CO2 28 03/19/2025     03/19/2025    BUN 17 03/19/2025    CREATININE 0.87 03/19/2025     Lab Results   Component Value Date    WBC 8.47 04/26/2023    HGB 14.1 04/26/2023    HCT 41.0 04/26/2023    MCV 94 04/26/2023     04/26/2023       Office Urine Dip  No results found for this or any previous visit (from the past hour).

## 2025-05-23 ENCOUNTER — TELEPHONE (OUTPATIENT)
Age: 75
End: 2025-05-23

## 2025-05-23 NOTE — TELEPHONE ENCOUNTER
Patient called today in attempt to speak with surgery scheduler to schedule biopsy.    Pt states he was seen on 5/20 and was told that he'd get a call in two days and to call if he doesn't hear from SS. I informed the patient that the SS gets 3-5 business days to contact and that a message is being sent to SS. To please await a returned call. Pt verbalized understanding.    Call back [phone]

## 2025-05-27 NOTE — TELEPHONE ENCOUNTER
Pt called and requested to reschedule his appointment with , call was connected with  [facility] for further assistance.

## 2025-05-28 NOTE — TELEPHONE ENCOUNTER
Spoke with patient and confirmed surgery date of: 7/31/25  Type of surgery: TRANSP US GUIDED BX  Operating physician: DR. SABA  Location of surgery: [facility]     Verbally went over prep with patient on: 5/28/25  NPO   Bowel prep? Yes, 1 enema 1 hour prior to leaving the house for the procedure   Hospital calls afternoon prior with arrival time -Calls Friday afternoon for Monday surgeries   Patient needs ride to and from surgery (outpatient)    Pre-op testing to be done 2 weeks prior to surgery    CBC,CMP,U/C,EKG  Blood thinners:Aspirin 81 mg    Clearances needed: none     Mailed packet on:   Copy of packet scanned into Media on:   Labs in packet   Soap instructions in packet    Post-op in packet   H&P on admit   PO  9/2, 11am, Dr. Cunningham       Consent: On admit

## 2025-06-02 ENCOUNTER — OFFICE VISIT (OUTPATIENT)
Dept: OBGYN CLINIC | Facility: CLINIC | Age: 75
End: 2025-06-02

## 2025-06-02 VITALS — WEIGHT: 162 LBS | BODY MASS INDEX: 26.99 KG/M2 | HEIGHT: 65 IN

## 2025-06-02 DIAGNOSIS — M72.0 DUPUYTREN'S DISEASE: Primary | ICD-10-CM

## 2025-06-02 PROCEDURE — 99024 POSTOP FOLLOW-UP VISIT: CPT | Performed by: ORTHOPAEDIC SURGERY

## 2025-06-02 NOTE — PROGRESS NOTES
"    ORTHOPAEDIC HAND, WRIST, AND ELBOW OFFICE  VISIT     Name: Flynn Nathan      : 1950      MRN: [id number]  Encounter Provider: Raza Eugene MD  Encounter Date: 2025   Encounter department: [facility] [place]  :  Assessment & Plan  Dupuytren's disease  Left small finger application digit widget - Left on 2025  Patient was advised to change from the purple to the pink bands at this time  He was advised to keep the pin sites clean with alcohol and warm soapy water  He was advised to change the bandage daily  He will follow-up in 1 week for reevaluation                 History of Present Illness   HPI  Chief Complaint   Patient presents with    Left Hand - Post-op     Digit Widget- 25       SUBJECTIVE:  Flynn Nathan is a 75 y.o. male who presents for follow up after Left small finger application digit widget - Left on 2025.  Today patient has been changing the bandage daily.  He has been keeping the pin sites clean with hydrogen peroxide and alcohol.       PHYSICAL EXAMINATION:  Vital signs: Ht 5' 5\" (1.651 m)   Wt 73.5 kg (162 lb)   BMI 26.96 kg/m²   General: well developed and well nourished, alert, oriented times 3, and appears comfortable  Psychiatric: Normal    MUSCULOSKELETAL EXAMINATION:  Incision: clean and dry  Range of Motion: As expected MP joint 55 degrees, PIP joint 45 degrees  Neurovascular status: Neuro intact, good cap refill  Activity Restrictions: No restrictions         STUDIES REVIEWED:  No Studies to review      PROCEDURES PERFORMED:  Procedures  No Procedures performed today    "

## 2025-06-09 ENCOUNTER — OFFICE VISIT (OUTPATIENT)
Dept: OBGYN CLINIC | Facility: CLINIC | Age: 75
End: 2025-06-09

## 2025-06-09 DIAGNOSIS — M72.0 DUPUYTREN'S DISEASE: Primary | ICD-10-CM

## 2025-06-09 PROCEDURE — 99024 POSTOP FOLLOW-UP VISIT: CPT | Performed by: ORTHOPAEDIC SURGERY

## 2025-06-09 NOTE — PROGRESS NOTES
ORTHOPAEDIC HAND, WRIST, AND ELBOW OFFICE  VISIT     Name: Flynn Nathan      : 1950      MRN: [id number]  Encounter Provider: Raza Eugene MD  Encounter Date: 2025   Encounter department: [facility] [place]  :  Assessment & Plan  Dupuytren's disease  Left small finger application digit widget - Left on 2025  It was discussed that we will change the band from pink to yellow at this time.   He was advised to give us a call if there are any issues or concerns  He will follow up in 2 week for re-evaluation.                  History of Present Illness   HPI  Chief Complaint   Patient presents with   • Left Hand - Post-op     Digit Widget- 25       SUBJECTIVE:  Flynn Nathan is a 75 y.o. male who presents for follow up after Left small finger application digit widget - Left on 2025.  Today patient has been changing the bandage daily.  He has been keeping the pin sites clean with hydrogen peroxide and alcohol.       PHYSICAL EXAMINATION:  Vital signs: There were no vitals taken for this visit.  General: well developed and well nourished, alert, oriented times 3, and appears comfortable  Psychiatric: Normal    MUSCULOSKELETAL EXAMINATION:  Incision: clean and dry  Range of Motion: As expected MP joint 55 degrees, PIP joint 40 degrees  Neurovascular status: Neuro intact, good cap refill  Activity Restrictions: No restrictions         STUDIES REVIEWED:  No Studies to review      PROCEDURES PERFORMED:  Procedures  No Procedures performed today    Scribe Attestation    I,:  Kuldeep Mcqueen PA-C am acting as a scribe while in the presence of the attending physician.:       I,:  Raza Eugene MD personally performed the services described in this documentation    as scribed in my presence.:

## 2025-06-13 ENCOUNTER — TELEPHONE (OUTPATIENT)
Age: 75
End: 2025-06-13

## 2025-06-13 NOTE — TELEPHONE ENCOUNTER
Caller: Rivas    Doctor: Dr. Eugene    Reason for call: digit widget placed 5 or 6 weeks ago recently changed band to heaviest band. It is pulling and causing pain that he cannot sleep.  Would like to know if he can switch between heaviest and lighter band.    Call back#: [phone]

## 2025-06-13 NOTE — TELEPHONE ENCOUNTER
Yes, he can back down to the pink band at this time if there is too much pain and discomfort.  I would do so for 2-3 days and then moved back to the heavier band.  We recently changed him to the heavy span that there is and we told him that there can be some pain and discomfort as it stretches it self out further.  Thank you.

## 2025-06-23 VITALS — WEIGHT: 162 LBS | BODY MASS INDEX: 26.99 KG/M2 | HEIGHT: 65 IN

## 2025-06-23 DIAGNOSIS — M72.0 DUPUYTREN'S DISEASE: Primary | ICD-10-CM

## 2025-06-23 PROCEDURE — 99024 POSTOP FOLLOW-UP VISIT: CPT | Performed by: ORTHOPAEDIC SURGERY

## 2025-06-23 NOTE — PROGRESS NOTES
"    ORTHOPAEDIC HAND, WRIST, AND ELBOW OFFICE  VISIT     Name: Flynn Nathan      : 1950      MRN: [id number]  Encounter Provider: Raza Eugene MD  Encounter Date: 2025   Encounter department: [facility] [place]  :  Assessment & Plan  Dupuytren's disease  Left small finger application digit widget performed 2025  It was discussed with the patient that he can use the yellow band during the day and pink band at nighttime  He will follow-up in 1 week for reevaluation  We discussed that after his follow-up next week, we can get him scheduled for Xiaflex injection into the small finger which would be a Wednesday and a Friday.               History of Present Illness   HPI  Chief Complaint   Patient presents with    Left Hand - Post-op     Digit Widget- 25       SUBJECTIVE:  Flynn Nathan is a 75 y.o. male who presents for follow up after Left small finger application digit widget - Left on 2025.  Today patient has been changing the bandage daily.  He has been keeping the pin sites clean with hydrogen peroxide and alcohol.       PHYSICAL EXAMINATION:  Vital signs: Ht 5' 5\" (1.651 m)   Wt 73.5 kg (162 lb)   BMI 26.96 kg/m²   General: well developed and well nourished, alert, oriented times 3, and appears comfortable  Psychiatric: Normal    MUSCULOSKELETAL EXAMINATION:  Incision: clean and dry  Range of Motion: As expected MP joint 45 degrees, PIP joint 25 degrees  Neurovascular status: Neuro intact, good cap refill  Activity Restrictions: No restrictions         STUDIES REVIEWED:  No Studies to review      PROCEDURES PERFORMED:  Procedures  No Procedures performed today      "

## 2025-06-30 VITALS — WEIGHT: 162 LBS | BODY MASS INDEX: 26.99 KG/M2 | HEIGHT: 65 IN

## 2025-06-30 DIAGNOSIS — M72.0 DUPUYTREN'S DISEASE: Primary | ICD-10-CM

## 2025-06-30 PROCEDURE — 99024 POSTOP FOLLOW-UP VISIT: CPT | Performed by: ORTHOPAEDIC SURGERY

## 2025-06-30 NOTE — PROGRESS NOTES
"    ORTHOPAEDIC HAND, WRIST, AND ELBOW OFFICE  VISIT     Name: Flynn Nathan      : 1950      MRN: [id number]  Encounter Provider: Raza Eugene MD  Encounter Date: 2025   Encounter department: [facility]  :  Assessment & Plan  Dupuytren's disease  Left small finger application digit widget - Left on 2025  It was discussed with the patient that he should continue to keep the pin sites clean with warm soapy water  He was advised to continue to keep the pink bands on as he is having some swelling after switching back-and-forth between the heavy band and the medium band  He will follow-up in 1 week for reevaluation   He will schedule his Xiaflex injection manipulation today               History of Present Illness   HPI  Chief Complaint   Patient presents with    Left Hand - Post-op     Digit Widget- 25       SUBJECTIVE:  Flynn Nathan is a 75 y.o. male who presents for follow up after Left small finger application digit widget - Left on 2025.  Today patient has been changing the bandage daily.  He has been keeping the pin sites clean with hydrogen peroxide and alcohol.       PHYSICAL EXAMINATION:  Vital signs: Ht 5' 5\" (1.651 m)   Wt 73.5 kg (162 lb)   BMI 26.96 kg/m²   General: well developed and well nourished, alert, oriented times 3, and appears comfortable  Psychiatric: Normal    MUSCULOSKELETAL EXAMINATION:  Incision: clean and dry  Range of Motion: As expected MP joint 45 degrees, PIP joint 25 degrees  Neurovascular status: Neuro intact, good cap refill  Activity Restrictions: No restrictions         STUDIES REVIEWED:  No Studies to review      PROCEDURES PERFORMED:  Procedures  No Procedures performed today      "

## 2025-07-01 ENCOUNTER — APPOINTMENT (OUTPATIENT)
Dept: LAB | Facility: HOSPITAL | Age: 75
End: 2025-07-01
Payer: MEDICARE

## 2025-07-01 ENCOUNTER — PREP FOR PROCEDURE (OUTPATIENT)
Dept: UROLOGY | Facility: MEDICAL CENTER | Age: 75
End: 2025-07-01

## 2025-07-01 DIAGNOSIS — R97.20 ELEVATED PSA: ICD-10-CM

## 2025-07-01 DIAGNOSIS — R39.89 SUSPECTED UTI: ICD-10-CM

## 2025-07-01 DIAGNOSIS — Z01.812 PRE-OPERATIVE LABORATORY EXAMINATION: ICD-10-CM

## 2025-07-01 DIAGNOSIS — Z01.810 PRE-OPERATIVE CARDIOVASCULAR EXAMINATION: ICD-10-CM

## 2025-07-01 DIAGNOSIS — R97.20 ELEVATED PSA: Primary | ICD-10-CM

## 2025-07-01 LAB
ALBUMIN SERPL BCG-MCNC: 4.4 G/DL (ref 3.5–5)
ALP SERPL-CCNC: 71 U/L (ref 34–104)
ALT SERPL W P-5'-P-CCNC: 28 U/L (ref 7–52)
ANION GAP SERPL CALCULATED.3IONS-SCNC: 4 MMOL/L (ref 4–13)
AST SERPL W P-5'-P-CCNC: 24 U/L (ref 13–39)
BASOPHILS # BLD AUTO: 0.09 THOUSANDS/ÂΜL (ref 0–0.1)
BASOPHILS NFR BLD AUTO: 1 % (ref 0–1)
BILIRUB SERPL-MCNC: 0.65 MG/DL (ref 0.2–1)
BUN SERPL-MCNC: 19 MG/DL (ref 5–25)
CALCIUM SERPL-MCNC: 9.2 MG/DL (ref 8.4–10.2)
CHLORIDE SERPL-SCNC: 102 MMOL/L (ref 96–108)
CO2 SERPL-SCNC: 29 MMOL/L (ref 21–32)
CREAT SERPL-MCNC: 0.93 MG/DL (ref 0.6–1.3)
EOSINOPHIL # BLD AUTO: 0.74 THOUSAND/ÂΜL (ref 0–0.61)
EOSINOPHIL NFR BLD AUTO: 12 % (ref 0–6)
ERYTHROCYTE [DISTWIDTH] IN BLOOD BY AUTOMATED COUNT: 12.9 % (ref 11.6–15.1)
GFR SERPL CREATININE-BSD FRML MDRD: 78 ML/MIN/1.73SQ M
GLUCOSE P FAST SERPL-MCNC: 103 MG/DL (ref 65–99)
HCT VFR BLD AUTO: 39.8 % (ref 36.5–49.3)
HGB BLD-MCNC: 14.1 G/DL (ref 12–17)
IMM GRANULOCYTES # BLD AUTO: 0.01 THOUSAND/UL (ref 0–0.2)
IMM GRANULOCYTES NFR BLD AUTO: 0 % (ref 0–2)
LYMPHOCYTES # BLD AUTO: 2.29 THOUSANDS/ÂΜL (ref 0.6–4.47)
LYMPHOCYTES NFR BLD AUTO: 36 % (ref 14–44)
MCH RBC QN AUTO: 32.5 PG (ref 26.8–34.3)
MCHC RBC AUTO-ENTMCNC: 35.4 G/DL (ref 31.4–37.4)
MCV RBC AUTO: 92 FL (ref 82–98)
MONOCYTES # BLD AUTO: 0.79 THOUSAND/ÂΜL (ref 0.17–1.22)
MONOCYTES NFR BLD AUTO: 13 % (ref 4–12)
NEUTROPHILS # BLD AUTO: 2.38 THOUSANDS/ÂΜL (ref 1.85–7.62)
NEUTS SEG NFR BLD AUTO: 38 % (ref 43–75)
NRBC BLD AUTO-RTO: 0 /100 WBCS
PLATELET # BLD AUTO: 202 THOUSANDS/UL (ref 149–390)
PMV BLD AUTO: 10.7 FL (ref 8.9–12.7)
POTASSIUM SERPL-SCNC: 4.4 MMOL/L (ref 3.5–5.3)
PROT SERPL-MCNC: 7 G/DL (ref 6.4–8.4)
RBC # BLD AUTO: 4.34 MILLION/UL (ref 3.88–5.62)
SODIUM SERPL-SCNC: 135 MMOL/L (ref 135–147)
WBC # BLD AUTO: 6.3 THOUSAND/UL (ref 4.31–10.16)

## 2025-07-01 PROCEDURE — 85025 COMPLETE CBC W/AUTO DIFF WBC: CPT

## 2025-07-01 PROCEDURE — 80053 COMPREHEN METABOLIC PANEL: CPT

## 2025-07-01 PROCEDURE — 36415 COLL VENOUS BLD VENIPUNCTURE: CPT

## 2025-07-02 LAB
ATRIAL RATE: 50 BPM
P AXIS: 26 DEGREES
PR INTERVAL: 180 MS
QRS AXIS: 0 DEGREES
QRSD INTERVAL: 88 MS
QT INTERVAL: 462 MS
QTC INTERVAL: 422 MS
T WAVE AXIS: 32 DEGREES
VENTRICULAR RATE: 50 BPM

## 2025-07-02 PROCEDURE — 93010 ELECTROCARDIOGRAM REPORT: CPT | Performed by: INTERNAL MEDICINE

## 2025-07-07 ENCOUNTER — TELEPHONE (OUTPATIENT)
Age: 75
End: 2025-07-07

## 2025-07-07 VITALS — HEIGHT: 65 IN | WEIGHT: 162 LBS | BODY MASS INDEX: 26.99 KG/M2

## 2025-07-07 DIAGNOSIS — M72.0 DUPUYTREN'S DISEASE: Primary | ICD-10-CM

## 2025-07-07 PROCEDURE — 99024 POSTOP FOLLOW-UP VISIT: CPT | Performed by: PHYSICIAN ASSISTANT

## 2025-07-07 NOTE — PROGRESS NOTES
"    ORTHOPAEDIC HAND, WRIST, AND ELBOW OFFICE  VISIT     Name: Flynn Nathan      : 1950      MRN: [id number]  Encounter Provider: Kuldeep Mcqueen PA-C  Encounter Date: 2025   Encounter department: [facility]  :  Assessment & Plan  Dupuytren's disease  Left small finger application digit widget - Left on 2025  It was discussed with the patient that he should continue to keep the pin sites clean with warm soapy water  He was advised to continue to keep the pink bands on at this time to maintain what he has.   He will follow-up in 1 month for reevaluation as he is going on vacation. At that time we will measure his flexion contracture and get ready to administer xiaflex injection for his cord into the palm of his hand for the small finger               History of Present Illness   HPI  Chief Complaint   Patient presents with    Left Hand - Post-op     Digit widget       SUBJECTIVE:  Flynn Nathan is a 75 y.o. male who presents for follow up after Left small finger application digit widget - Left on 2025.  Today patient has been changing the bandage daily.  Patient states he is ordering pink bands.  Patient is overall pleased with his progress.      PHYSICAL EXAMINATION:  Vital signs: Ht 5' 5\" (1.651 m)   Wt 73.5 kg (162 lb)   BMI 26.96 kg/m²   General: well developed and well nourished, alert, oriented times 3, and appears comfortable  Psychiatric: Normal    MUSCULOSKELETAL EXAMINATION:  Incision: clean and dry  Range of Motion: As expected MP joint 45 degrees, PIP joint 23 degrees  Neurovascular status: Neuro intact, good cap refill  Activity Restrictions: No restrictions         STUDIES REVIEWED:  No Studies to review      PROCEDURES PERFORMED:  Procedures  No Procedures performed today      "

## 2025-07-07 NOTE — TELEPHONE ENCOUNTER
Please email patient his Urine lab orders at     [email or web address]    He will be on vacation and will obtain his urine order there and will have the results faxed to us at our [place] fax, number provided

## 2025-07-22 RX ORDER — FEXOFENADINE HCL 180 MG/1
180 TABLET ORAL DAILY
COMMUNITY

## 2025-07-23 ENCOUNTER — PREP FOR PROCEDURE (OUTPATIENT)
Dept: UROLOGY | Facility: MEDICAL CENTER | Age: 75
End: 2025-07-23

## 2025-07-29 ENCOUNTER — ANESTHESIA EVENT (OUTPATIENT)
Dept: PERIOP | Facility: HOSPITAL | Age: 75
End: 2025-07-29
Payer: MEDICARE

## 2025-07-31 ENCOUNTER — HOSPITAL ENCOUNTER (OUTPATIENT)
Facility: HOSPITAL | Age: 75
Setting detail: OUTPATIENT SURGERY
Discharge: HOME | End: 2025-07-31
Attending: UROLOGY | Admitting: UROLOGY
Payer: MEDICARE

## 2025-07-31 ENCOUNTER — ANESTHESIA (OUTPATIENT)
Dept: PERIOP | Facility: HOSPITAL | Age: 75
End: 2025-07-31
Payer: MEDICARE

## 2025-07-31 VITALS
BODY MASS INDEX: 26.49 KG/M2 | DIASTOLIC BLOOD PRESSURE: 63 MMHG | HEART RATE: 54 BPM | RESPIRATION RATE: 16 BRPM | WEIGHT: 159.17 LBS | SYSTOLIC BLOOD PRESSURE: 121 MMHG | OXYGEN SATURATION: 96 % | TEMPERATURE: 97.2 F

## 2025-07-31 DIAGNOSIS — R97.20 ELEVATED PSA: ICD-10-CM

## 2025-07-31 PROCEDURE — G0416 PROSTATE BIOPSY, ANY MTHD: HCPCS | Performed by: PATHOLOGY

## 2025-07-31 PROCEDURE — 88344 IMHCHEM/IMCYTCHM EA MLT ANTB: CPT | Performed by: PATHOLOGY

## 2025-07-31 PROCEDURE — NC001 PR NO CHARGE: Performed by: UROLOGY

## 2025-07-31 PROCEDURE — 76942 ECHO GUIDE FOR BIOPSY: CPT | Performed by: UROLOGY

## 2025-07-31 PROCEDURE — 55700 PR PROSTATE NEEDLE BIOPSY ANY APPROACH: CPT | Performed by: UROLOGY

## 2025-07-31 RX ORDER — FENTANYL CITRATE 50 UG/ML
INJECTION, SOLUTION INTRAMUSCULAR; INTRAVENOUS AS NEEDED
Status: DISCONTINUED | OUTPATIENT
Start: 2025-07-31 | End: 2025-07-31

## 2025-07-31 RX ORDER — CEFAZOLIN SODIUM 2 G/50ML
2000 SOLUTION INTRAVENOUS EVERY 8 HOURS
Status: DISCONTINUED | OUTPATIENT
Start: 2025-07-31 | End: 2025-07-31 | Stop reason: HOSPADM

## 2025-07-31 RX ORDER — CEFAZOLIN SODIUM 1 G/3ML
INJECTION, POWDER, FOR SOLUTION INTRAMUSCULAR; INTRAVENOUS AS NEEDED
Status: DISCONTINUED | OUTPATIENT
Start: 2025-07-31 | End: 2025-07-31

## 2025-07-31 RX ORDER — OXYCODONE HYDROCHLORIDE 5 MG/1
5 TABLET ORAL EVERY 4 HOURS PRN
Status: DISCONTINUED | OUTPATIENT
Start: 2025-07-31 | End: 2025-07-31 | Stop reason: HOSPADM

## 2025-07-31 RX ORDER — PROPOFOL 10 MG/ML
INJECTION, EMULSION INTRAVENOUS AS NEEDED
Status: DISCONTINUED | OUTPATIENT
Start: 2025-07-31 | End: 2025-07-31

## 2025-07-31 RX ORDER — MAGNESIUM HYDROXIDE 1200 MG/15ML
LIQUID ORAL AS NEEDED
Status: DISCONTINUED | OUTPATIENT
Start: 2025-07-31 | End: 2025-07-31 | Stop reason: HOSPADM

## 2025-07-31 RX ORDER — BUPIVACAINE HYDROCHLORIDE 5 MG/ML
INJECTION, SOLUTION EPIDURAL; INTRACAUDAL; PERINEURAL AS NEEDED
Status: DISCONTINUED | OUTPATIENT
Start: 2025-07-31 | End: 2025-07-31 | Stop reason: HOSPADM

## 2025-07-31 RX ORDER — SODIUM CHLORIDE, SODIUM LACTATE, POTASSIUM CHLORIDE, CALCIUM CHLORIDE 600; 310; 30; 20 MG/100ML; MG/100ML; MG/100ML; MG/100ML
125 INJECTION, SOLUTION INTRAVENOUS CONTINUOUS
Status: DISCONTINUED | OUTPATIENT
Start: 2025-07-31 | End: 2025-07-31 | Stop reason: HOSPADM

## 2025-07-31 RX ORDER — SODIUM CHLORIDE, SODIUM LACTATE, POTASSIUM CHLORIDE, CALCIUM CHLORIDE 600; 310; 30; 20 MG/100ML; MG/100ML; MG/100ML; MG/100ML
20 INJECTION, SOLUTION INTRAVENOUS CONTINUOUS
Status: DISCONTINUED | OUTPATIENT
Start: 2025-07-31 | End: 2025-07-31 | Stop reason: HOSPADM

## 2025-07-31 RX ORDER — CEFAZOLIN SODIUM 1 G/3ML
2000 INJECTION, POWDER, FOR SOLUTION INTRAMUSCULAR; INTRAVENOUS ONCE
Status: ACTIVE | OUTPATIENT
Start: 2025-07-31

## 2025-07-31 RX ADMIN — SODIUM CHLORIDE, SODIUM LACTATE, POTASSIUM CHLORIDE, AND CALCIUM CHLORIDE 125 ML/HR: .6; .31; .03; .02 INJECTION, SOLUTION INTRAVENOUS at 08:40

## 2025-07-31 RX ADMIN — PROPOFOL 100 MCG/KG/MIN: 10 INJECTION, EMULSION INTRAVENOUS at 11:23

## 2025-07-31 RX ADMIN — CEFAZOLIN 2000 MG: 1 INJECTION, POWDER, FOR SOLUTION INTRAMUSCULAR; INTRAVENOUS at 11:25

## 2025-07-31 RX ADMIN — PROPOFOL 50 MG: 10 INJECTION, EMULSION INTRAVENOUS at 11:22

## 2025-07-31 RX ADMIN — FENTANYL CITRATE 50 MCG: 50 INJECTION INTRAMUSCULAR; INTRAVENOUS at 11:22

## 2025-07-31 RX ADMIN — FENTANYL CITRATE 50 MCG: 50 INJECTION INTRAMUSCULAR; INTRAVENOUS at 11:26

## 2025-07-31 RX ADMIN — PROPOFOL 20 MG: 10 INJECTION, EMULSION INTRAVENOUS at 11:43

## 2025-08-01 VITALS — HEIGHT: 65 IN | WEIGHT: 159 LBS | BODY MASS INDEX: 26.49 KG/M2

## 2025-08-01 DIAGNOSIS — M72.0 DUPUYTREN'S DISEASE: Primary | ICD-10-CM

## 2025-08-01 PROCEDURE — 99024 POSTOP FOLLOW-UP VISIT: CPT | Performed by: PHYSICIAN ASSISTANT

## 2025-08-04 PROCEDURE — G0416 PROSTATE BIOPSY, ANY MTHD: HCPCS | Performed by: PATHOLOGY

## 2025-08-04 PROCEDURE — 88344 IMHCHEM/IMCYTCHM EA MLT ANTB: CPT | Performed by: PATHOLOGY

## 2025-08-06 VITALS — BODY MASS INDEX: 26.49 KG/M2 | HEIGHT: 65 IN | WEIGHT: 159 LBS

## 2025-08-06 DIAGNOSIS — M72.0 DUPUYTREN'S DISEASE: Primary | ICD-10-CM

## 2025-08-06 PROCEDURE — 99213 OFFICE O/P EST LOW 20 MIN: CPT | Performed by: ORTHOPAEDIC SURGERY

## 2025-08-06 PROCEDURE — 20527 NJX NZM PALMAR FASCIAL CORD: CPT | Performed by: PHYSICIAN ASSISTANT

## 2025-08-08 ENCOUNTER — OFFICE VISIT (OUTPATIENT)
Dept: OCCUPATIONAL THERAPY | Facility: HOSPITAL | Age: 75
End: 2025-08-08
Payer: MEDICARE

## 2025-08-08 VITALS — WEIGHT: 159 LBS | BODY MASS INDEX: 26.49 KG/M2 | HEIGHT: 65 IN

## 2025-08-08 DIAGNOSIS — M72.0 DUPUYTREN'S DISEASE OF PALM OF LEFT HAND: Primary | ICD-10-CM

## 2025-08-08 DIAGNOSIS — M72.0 DUPUYTREN'S DISEASE: Primary | ICD-10-CM

## 2025-08-08 PROCEDURE — L3913 HFO W/O JOINTS CF: HCPCS | Performed by: OCCUPATIONAL THERAPIST

## 2025-08-08 PROCEDURE — 26341 MANIPULAT PALM CORD POST INJ: CPT | Performed by: PHYSICIAN ASSISTANT

## 2025-08-08 RX ORDER — LIDOCAINE HYDROCHLORIDE 10 MG/ML
4 INJECTION, SOLUTION INFILTRATION; PERINEURAL
Status: COMPLETED | OUTPATIENT
Start: 2025-08-08 | End: 2025-08-08

## 2025-08-08 RX ORDER — LIDOCAINE HYDROCHLORIDE 10 MG/ML
2 INJECTION, SOLUTION INFILTRATION; PERINEURAL
Status: COMPLETED | OUTPATIENT
Start: 2025-08-08 | End: 2025-08-08

## 2025-08-08 RX ORDER — LIDOCAINE HYDROCHLORIDE 10 MG/ML
3 INJECTION, SOLUTION INFILTRATION; PERINEURAL
Status: COMPLETED | OUTPATIENT
Start: 2025-08-08 | End: 2025-08-08

## 2025-08-08 RX ORDER — LIDOCAINE HYDROCHLORIDE 10 MG/ML
1 INJECTION, SOLUTION INFILTRATION; PERINEURAL
Status: COMPLETED | OUTPATIENT
Start: 2025-08-08 | End: 2025-08-08

## 2025-08-08 RX ADMIN — LIDOCAINE HYDROCHLORIDE 2 ML: 10 INJECTION, SOLUTION INFILTRATION; PERINEURAL at 09:30

## 2025-08-08 RX ADMIN — LIDOCAINE HYDROCHLORIDE 1 ML: 10 INJECTION, SOLUTION INFILTRATION; PERINEURAL at 09:30

## 2025-08-08 RX ADMIN — LIDOCAINE HYDROCHLORIDE 3 ML: 10 INJECTION, SOLUTION INFILTRATION; PERINEURAL at 09:30

## 2025-08-08 RX ADMIN — LIDOCAINE HYDROCHLORIDE 4 ML: 10 INJECTION, SOLUTION INFILTRATION; PERINEURAL at 09:30

## (undated) DEVICE — Device

## (undated) DEVICE — INTENDED FOR TISSUE SEPARATION, AND OTHER PROCEDURES THAT REQUIRE A SHARP SURGICAL BLADE TO PUNCTURE OR CUT.: Brand: BARD-PARKER ® CARBON RIB-BACK BLADES

## (undated) DEVICE — PERCLOSE™ PROSTYLE™ SUTURE-MEDIATED CLOSURE AND REPAIR SYSTEM
Type: IMPLANTABLE DEVICE | Site: GROIN | Status: NON-FUNCTIONAL
Brand: PERCLOSE™ PROSTYLE™

## (undated) DEVICE — Device: Brand: MINAMO

## (undated) DEVICE — C-ARM: Brand: UNBRANDED

## (undated) DEVICE — CATH DIAG 5FR IMPULSE 100CM FL4

## (undated) DEVICE — GLOVE SRG BIOGEL 7.5

## (undated) DEVICE — ARM SLING: Brand: DEROYAL

## (undated) DEVICE — PINNACLE INTRODUCER SHEATH: Brand: PINNACLE

## (undated) DEVICE — NC TREK NEO™ CORONARY DILATATION CATHETER 4.00 MM X 12 MM / RAPID-EXCHANGE: Brand: NC TREK NEO™

## (undated) DEVICE — DGW .035 FC J3MM 150CM TEF: Brand: EMERALD

## (undated) DEVICE — NEEDLE BIOPSY 18G X 25CM MAX-CORE

## (undated) DEVICE — NC TREK NEO™ CORONARY DILATATION CATHETER 3.25 MM X 12 MM / RAPID-EXCHANGE: Brand: NC TREK NEO™

## (undated) DEVICE — SKN PRP WNG SPNGE PVP SCRB STR: Brand: MEDLINE INDUSTRIES, INC.

## (undated) DEVICE — SPONGE GAUZE 4 X 4 16 PLY STRL PLASTIC TRAY LF

## (undated) DEVICE — CATH DIAG 5FR IMPULSE 100CM IM

## (undated) DEVICE — CATH GUIDE LAUNCHER 6FR EBU 3.5

## (undated) DEVICE — REM POLYHESIVE ADULT PATIENT RETURN ELECTRODE: Brand: VALLEYLAB

## (undated) DEVICE — ACE WRAP 3 IN UNSTERILE

## (undated) DEVICE — CATH DIAG 5FR IMPULSE 100CM FR4

## (undated) DEVICE — DRAPE IOBAN 2-13 X 13 SM

## (undated) DEVICE — STERILE BETHLEHEM PLASTIC HAND: Brand: CARDINAL HEALTH

## (undated) DEVICE — KERLIX BANDAGE ROLL: Brand: KERLIX

## (undated) DEVICE — SPECIMEN CONTAINER STERILE PEEL PACK

## (undated) DEVICE — GLOVE INDICATOR PI UNDERGLOVE SZ 8 BLUE

## (undated) DEVICE — MICROPUNCTURE INTRODUCER SET SILHOUETTE TRANSITIONLESS PUSH-PLUS DESIGN - STIFFENED CANNULA WITH NITINOL WIRE GUIDE: Brand: MICROPUNCTURE

## (undated) DEVICE — TREK CORONARY DILATATION CATHETER 2.50 MM X 15 MM / RAPID-EXCHANGE: Brand: TREK

## (undated) DEVICE — DGW .035 FC J3MM 260CM TEF: Brand: EMERALD